# Patient Record
Sex: MALE | Race: WHITE | NOT HISPANIC OR LATINO | Employment: FULL TIME | URBAN - METROPOLITAN AREA
[De-identification: names, ages, dates, MRNs, and addresses within clinical notes are randomized per-mention and may not be internally consistent; named-entity substitution may affect disease eponyms.]

---

## 2018-10-30 ENCOUNTER — TELEPHONE (OUTPATIENT)
Dept: CARDIOLOGY | Facility: CLINIC | Age: 53
End: 2018-10-30

## 2018-10-30 NOTE — TELEPHONE ENCOUNTER
Patient was referred to Dr Chiang from Dr Marie for ASD closure.  Dr Chiang has reviewed the echo and was notified today by Dr Marie that he also has afib and would like to have ablation here at [facility].  Dr Chiang spoke with Dr Agustin and he will see and ablate prior to closing the ASD.

## 2018-11-05 DIAGNOSIS — I49.8 OTHER SPECIFIED CARDIAC ARRHYTHMIAS: Primary | ICD-10-CM

## 2018-11-07 ENCOUNTER — OFFICE VISIT (OUTPATIENT)
Dept: ELECTROPHYSIOLOGY | Facility: CLINIC | Age: 53
End: 2018-11-07
Payer: COMMERCIAL

## 2018-11-07 ENCOUNTER — HOSPITAL ENCOUNTER (OUTPATIENT)
Dept: CARDIOLOGY | Facility: CLINIC | Age: 53
Discharge: HOME | End: 2018-11-07
Payer: COMMERCIAL

## 2018-11-07 VITALS
DIASTOLIC BLOOD PRESSURE: 70 MMHG | SYSTOLIC BLOOD PRESSURE: 122 MMHG | BODY MASS INDEX: 33.11 KG/M2 | WEIGHT: 249.81 LBS | HEART RATE: 90 BPM | HEIGHT: 73 IN

## 2018-11-07 DIAGNOSIS — Q21.10 ASD (ATRIAL SEPTAL DEFECT): ICD-10-CM

## 2018-11-07 DIAGNOSIS — I48.19 PERSISTENT ATRIAL FIBRILLATION: Primary | ICD-10-CM

## 2018-11-07 DIAGNOSIS — I49.8 OTHER SPECIFIED CARDIAC ARRHYTHMIAS: ICD-10-CM

## 2018-11-07 DIAGNOSIS — I48.19 PERSISTENT ATRIAL FIBRILLATION: ICD-10-CM

## 2018-11-07 PROCEDURE — 93000 ELECTROCARDIOGRAM COMPLETE: CPT | Mod: S$GLB,,, | Performed by: INTERNAL MEDICINE

## 2018-11-07 PROCEDURE — 99202 OFFICE O/P NEW SF 15 MIN: CPT | Mod: S$GLB,,, | Performed by: INTERNAL MEDICINE

## 2018-11-07 PROCEDURE — 99999 PR PBB SHADOW E&M-EST. PATIENT-LVL III: CPT | Mod: PBBFAC,,, | Performed by: INTERNAL MEDICINE

## 2018-11-07 NOTE — PROGRESS NOTES
"Subjective:    Patient ID:  Juan Luis Guadalupe is a 53 y.o. male who presents for evaluation of Atrial Fibrillation      HPI 54 yo male with atrial fibrillation, ASD.  Primary cardiologist is Dr. Marie.  Presented to PCP with fatigue, noted to be in atrial fibrillation 10/18.  Has noted fatigue increasing in nature over the past year.  Also noted "something not feeling right in my chest."  Echo 9/20/18 normal biventricular structure and function mildly dilated RA and LA, sinus venosus ASD < 10 mm.  Placed on Xarelto.  DCCV yesterday.  Left hospital in nsr (I have an ECG confirming nsr after).  Is back in atrial fibrillation today.  Referred for evaluation of sleep apnea.  Was told he had "mild" sleep apnea.      Review of Systems   Constitution: Positive for malaise/fatigue. Negative for weakness.   Cardiovascular: Negative for chest pain, dyspnea on exertion, irregular heartbeat, leg swelling, near-syncope, orthopnea, palpitations, paroxysmal nocturnal dyspnea and syncope.   Respiratory: Negative for cough.    Neurological: Negative for dizziness and light-headedness.   All other systems reviewed and are negative.       Objective:    Physical Exam   Constitutional: He is oriented to person, place, and time. He appears well-developed and well-nourished.   Eyes: Conjunctivae are normal. No scleral icterus.   Neck: No JVD present. No tracheal deviation present.   Cardiovascular: Normal rate and normal heart sounds. An irregularly irregular rhythm present. PMI is not displaced.   Pulmonary/Chest: Effort normal and breath sounds normal. No respiratory distress.   Abdominal: Soft. There is no hepatosplenomegaly. There is no tenderness.   Musculoskeletal: He exhibits no edema (lower extremity) or tenderness.   Neurological: He is alert and oriented to person, place, and time.   Skin: Skin is warm and dry. No rash noted.   Psychiatric: He has a normal mood and affect. His behavior is normal.         Assessment:       1. " Persistent atrial fibrillation    2. ASD (atrial septal defect)         Plan:       Persistent atrial fibrillation, symptomatic.  Failed DCCV.  Prefers to defer anti-arrhythmic therapy.  Also, has ASD that will ultimately warrant closure.  It would make sense to proceed with PVI prior to closure.  Risks and benefits of PVI discussed, he would like to proceed.    24 hr holter monitor to assess whether he is persistent or paroxysmal (suspect the former).    RFA.  CTA of chest to delineate PV anatomy.  JOS 1 day prior.  Continue Xarelto evening prior.

## 2018-11-16 ENCOUNTER — HOSPITAL ENCOUNTER (OUTPATIENT)
Dept: RADIOLOGY | Facility: HOSPITAL | Age: 53
Discharge: HOME | End: 2018-11-16
Attending: INTERNAL MEDICINE
Payer: COMMERCIAL

## 2018-11-16 DIAGNOSIS — I48.19 PERSISTENT ATRIAL FIBRILLATION: ICD-10-CM

## 2018-11-16 PROCEDURE — 71275 CT ANGIOGRAPHY CHEST: CPT | Mod: TC

## 2018-11-16 PROCEDURE — 71275 CT ANGIOGRAPHY CHEST: CPT | Mod: 26,,, | Performed by: RADIOLOGY

## 2018-11-16 PROCEDURE — 25500020 PHARM REV CODE 255: Performed by: INTERNAL MEDICINE

## 2018-11-16 RX ADMIN — IOHEXOL 100 ML: 350 INJECTION, SOLUTION INTRAVENOUS at 12:11

## 2018-12-04 DIAGNOSIS — I48.19 PERSISTENT ATRIAL FIBRILLATION: Primary | ICD-10-CM

## 2018-12-04 NOTE — PROGRESS NOTES
TRANSESOPHAGEAL ECHO (JOS) INSTRUCTIONS    You have been scheduled for a procedure in the echo lab on 12/13/2018.      Please report to the Cardiology Waiting Area on the Third floor of the hospital and check in at @ 11 AM.     You will then be taken to the SSCU (Short Stay Cardiac Unit) and prepared for your procedure. Please be aware that this is not the time of your procedure but the time you are to arrive. The procedures are scheduled on an hourly basis; however, emergency cases take precedence over all other cases.      You may not have anything to eat or drink after midnight the night before your test.     Medications:  You may take your regular morning medications with water.     The procedure will take 1-2 hours to perform. After the procedure, you will return to SSCU on the third floor of the hospital. Your family may remain in the room with you during this time.      You will be discharged home that same afternoon. You must have someone to drive you home.  Your doctor will determine, based on your progress, the time of your discharge. The results of your procedure will be discussed with you before you are discharged.      You will be contacted by the echo department prior to your procedure for a  pre-procedure questionnaire.     Any need to reschedule or cancel procedures, or any questions regarding your procedures should be addressed directly with the Arrhythmia Department Nurses at the following phone number: [phone].    Directions to Cardiology Waiting Area:  If you park in the Parking Garage:  Take elevators to the 2nd floor  Walk up ramp and turn right by Gold Elevators  Take elevator to the 3rd floor  Upon exiting the elevator, turn away from the clinic areas  Walk long reilly around to front of hospital to area with windows overlooking [facility]  Check in at Reception Desk  OR  If family is dropping you off:  Have them drop you off at the front of the Hospital  (Near the ER, where all the  flags are hung).  Take the E elevators to the 3rd floor.  Check in at the Reception Desk in the waiting room.

## 2018-12-04 NOTE — PROGRESS NOTES
ABLATION EDUCATION CHECKLIST    PRE-PROCEDURE TESTIN/5/2018 - Pre op lab work  · Lab orders faxed to [facility] and attached to this email  · Fasting NOT required      2018 - JOS (Transesophageal Echocardiogram) at 11am  · Please see separate instructions        DAY OF PROCEDURE:    2018 @ 5:30 AM  Report to Cardiology Waiting Room on 3rd floor of the Hospital    · Do not eat or drink anything after: 12 mn on the night before your procedure      Medications:   · Continue Xarelto the evening prior to ablation  · You may take your usual morning medications with a sip of water      Directions to the Cardiology Waiting Room  If you park in the Parking Garage:  Take elevators to the 2nd floor  Walk up ramp and turn right by Gold Elevators  Take elevator to the 3rd floor  Upon exiting the elevator, turn away from the clinic areas  Walk long reilly around to front of hospital to area with windows overlooking [facility]  Check in at Reception Desk  OR  If family is dropping you off:  Have them drop you off at the front of the Hospital  (Near the ER, where all the flags are hung).  Take the E elevators to the 3rd floor.  Check in at the Reception Desk in the waiting room.    · You will be spending the night after your procedure.  · You will need someone to drive you home the day after your procedure.  · Your pain during your procedure will be managed by the anesthesia team.     Any need to reschedule or cancel procedures, or any questions regarding your procedures should be addressed directly with the Arrhythmia Department Nurses at the following phone number: [phone]

## 2018-12-06 DIAGNOSIS — I48.19 PERSISTENT ATRIAL FIBRILLATION: Primary | ICD-10-CM

## 2018-12-11 RX ORDER — SODIUM CHLORIDE 9 MG/ML
INJECTION, SOLUTION INTRAVENOUS CONTINUOUS
Status: CANCELLED | OUTPATIENT
Start: 2018-12-11

## 2018-12-12 ENCOUNTER — TELEPHONE (OUTPATIENT)
Dept: CARDIOLOGY | Facility: CLINIC | Age: 53
End: 2018-12-12

## 2018-12-12 NOTE — TELEPHONE ENCOUNTER
Patient called about JOS scheduled on 12/13/18. Denies swallowing issues and esophageal issues.  JOS described to patient. Instructed NPO past midnight except medications with a small sip of water. Patient instructed will need a designated  as unable to drive or operate machinery for 24 hours post procedure. Instructed to report to SSCU at designated time .Verbalizes understanding. Questions answered.     Height: 73  Weight: 249    Blood thinner:  Xarelto

## 2018-12-13 ENCOUNTER — ANESTHESIA EVENT (OUTPATIENT)
Dept: MEDSURG UNIT | Facility: HOSPITAL | Age: 53
End: 2018-12-13
Payer: COMMERCIAL

## 2018-12-13 ENCOUNTER — HOSPITAL ENCOUNTER (OUTPATIENT)
Facility: HOSPITAL | Age: 53
Discharge: HOME | End: 2018-12-13
Attending: INTERNAL MEDICINE | Admitting: INTERNAL MEDICINE
Payer: COMMERCIAL

## 2018-12-13 ENCOUNTER — ANESTHESIA (OUTPATIENT)
Dept: MEDSURG UNIT | Facility: HOSPITAL | Age: 53
End: 2018-12-13
Payer: COMMERCIAL

## 2018-12-13 ENCOUNTER — HOSPITAL ENCOUNTER (OUTPATIENT)
Dept: CARDIOLOGY | Facility: CLINIC | Age: 53
Discharge: HOME | End: 2018-12-13
Payer: COMMERCIAL

## 2018-12-13 ENCOUNTER — RESEARCH ENCOUNTER (OUTPATIENT)
Dept: RESEARCH | Facility: HOSPITAL | Age: 53
End: 2018-12-13

## 2018-12-13 ENCOUNTER — HOSPITAL ENCOUNTER (OUTPATIENT)
Dept: CARDIOLOGY | Facility: CLINIC | Age: 53
Discharge: HOME | End: 2018-12-13
Attending: INTERNAL MEDICINE
Payer: COMMERCIAL

## 2018-12-13 VITALS
SYSTOLIC BLOOD PRESSURE: 124 MMHG | HEIGHT: 73 IN | BODY MASS INDEX: 33.13 KG/M2 | DIASTOLIC BLOOD PRESSURE: 82 MMHG | WEIGHT: 250 LBS | HEART RATE: 90 BPM

## 2018-12-13 VITALS
SYSTOLIC BLOOD PRESSURE: 124 MMHG | HEART RATE: 93 BPM | HEIGHT: 73 IN | RESPIRATION RATE: 20 BRPM | TEMPERATURE: 98 F | BODY MASS INDEX: 33.13 KG/M2 | OXYGEN SATURATION: 98 % | WEIGHT: 250 LBS | DIASTOLIC BLOOD PRESSURE: 82 MMHG

## 2018-12-13 DIAGNOSIS — I48.19 PERSISTENT ATRIAL FIBRILLATION: ICD-10-CM

## 2018-12-13 DIAGNOSIS — Q21.10 ASD (ATRIAL SEPTAL DEFECT): ICD-10-CM

## 2018-12-13 DIAGNOSIS — I48.91 AF (ATRIAL FIBRILLATION): ICD-10-CM

## 2018-12-13 DIAGNOSIS — I48.19 PERSISTENT ATRIAL FIBRILLATION: Primary | ICD-10-CM

## 2018-12-13 LAB
ABO + RH BLD: NORMAL
ANION GAP SERPL CALC-SCNC: 12 MMOL/L
APTT BLDCRRT: 30.4 SEC
ASCENDING AORTA: 2.9 CM
ASCENDING AORTA: 3 CM
BASOPHILS # BLD AUTO: 0.03 K/UL
BASOPHILS NFR BLD: 0.6 %
BLD GP AB SCN CELLS X3 SERPL QL: NORMAL
BSA FOR ECHO PROCEDURE: 2.42 M2
BSA FOR ECHO PROCEDURE: 2.42 M2
BUN SERPL-MCNC: 19 MG/DL
CALCIUM SERPL-MCNC: 9.1 MG/DL
CHLORIDE SERPL-SCNC: 111 MMOL/L
CO2 SERPL-SCNC: 18 MMOL/L
CREAT SERPL-MCNC: 1.2 MG/DL
CV ECHO LV RWT: 0.44 CM
DIFFERENTIAL METHOD: ABNORMAL
DOP CALC LVOT AREA: 3.76 CM2
DOP CALC LVOT DIAMETER: 2.19 CM
DOP CALC LVOT STROKE VOLUME: 47.74 CM3
DOP CALCLVOT PEAK VEL VTI: 12.68 CM
E/E' RATIO: 13.67
ECHO LV POSTERIOR WALL: 0.99 CM (ref 0.6–1.1)
EOSINOPHIL # BLD AUTO: 0.1 K/UL
EOSINOPHIL NFR BLD: 2.7 %
ERYTHROCYTE [DISTWIDTH] IN BLOOD BY AUTOMATED COUNT: 12.6 %
EST. GFR  (AFRICAN AMERICAN): >60 ML/MIN/1.73 M^2
EST. GFR  (NON AFRICAN AMERICAN): >60 ML/MIN/1.73 M^2
FRACTIONAL SHORTENING: 29 % (ref 28–44)
GLUCOSE SERPL-MCNC: 95 MG/DL
HCT VFR BLD AUTO: 46.2 %
HGB BLD-MCNC: 15.8 G/DL
IMM GRANULOCYTES # BLD AUTO: 0.01 K/UL
IMM GRANULOCYTES NFR BLD AUTO: 0.2 %
INR PPP: 1.1
INTERVENTRICULAR SEPTUM: 1.07 CM (ref 0.6–1.1)
LA MAJOR: 6.53 CM
LA MINOR: 6.69 CM
LA WIDTH: 4.47 CM
LEFT ATRIUM SIZE: 5 CM
LEFT ATRIUM VOLUME INDEX: 53.1 ML/M2
LEFT ATRIUM VOLUME: 125.56 CM3
LEFT INTERNAL DIMENSION IN SYSTOLE: 3.19 CM (ref 2.1–4)
LEFT VENTRICLE DIASTOLIC VOLUME INDEX: 38.97 ML/M2
LEFT VENTRICLE DIASTOLIC VOLUME: 92.2 ML
LEFT VENTRICLE MASS INDEX: 67.2 G/M2
LEFT VENTRICLE SYSTOLIC VOLUME INDEX: 17.1 ML/M2
LEFT VENTRICLE SYSTOLIC VOLUME: 40.54 ML
LEFT VENTRICULAR INTERNAL DIMENSION IN DIASTOLE: 4.49 CM (ref 3.5–6)
LEFT VENTRICULAR MASS: 159.09 G
LV LATERAL E/E' RATIO: 11.71
LV SEPTAL E/E' RATIO: 16.4
LYMPHOCYTES # BLD AUTO: 1.5 K/UL
LYMPHOCYTES NFR BLD: 30.7 %
MCH RBC QN AUTO: 32.8 PG
MCHC RBC AUTO-ENTMCNC: 34.2 G/DL
MCV RBC AUTO: 96 FL
MONOCYTES # BLD AUTO: 0.4 K/UL
MONOCYTES NFR BLD: 7.6 %
MV PEAK E VEL: 0.82 M/S
NEUTROPHILS # BLD AUTO: 2.9 K/UL
NEUTROPHILS NFR BLD: 58.2 %
NRBC BLD-RTO: 0 /100 WBC
PLATELET # BLD AUTO: 181 K/UL
PMV BLD AUTO: 9.9 FL
POTASSIUM SERPL-SCNC: 5.2 MMOL/L
PROTHROMBIN TIME: 11.2 SEC
RA MAJOR: 6.3 CM
RA PRESSURE: 3 MMHG
RA WIDTH: 5.27 CM
RBC # BLD AUTO: 4.81 M/UL
RIGHT VENTRICULAR END-DIASTOLIC DIMENSION: 4.69 CM
SINUS: 3.1 CM
SINUS: 3.4 CM
SODIUM SERPL-SCNC: 141 MMOL/L
STJ: 2.61 CM
STJ: 2.7 CM
TDI LATERAL: 0.07
TDI SEPTAL: 0.05
TDI: 0.06
TRICUSPID ANNULAR PLANE SYSTOLIC EXCURSION: 1.45 CM
WBC # BLD AUTO: 4.89 K/UL

## 2018-12-13 PROCEDURE — 85025 COMPLETE CBC W/AUTO DIFF WBC: CPT

## 2018-12-13 PROCEDURE — 85730 THROMBOPLASTIN TIME PARTIAL: CPT

## 2018-12-13 PROCEDURE — 93325 DOPPLER ECHO COLOR FLOW MAPG: CPT | Mod: S$GLB,,, | Performed by: INTERNAL MEDICINE

## 2018-12-13 PROCEDURE — 93005 ELECTROCARDIOGRAM TRACING: CPT

## 2018-12-13 PROCEDURE — 80048 BASIC METABOLIC PNL TOTAL CA: CPT

## 2018-12-13 PROCEDURE — 93320 DOPPLER ECHO COMPLETE: CPT | Mod: S$GLB,,, | Performed by: INTERNAL MEDICINE

## 2018-12-13 PROCEDURE — 85610 PROTHROMBIN TIME: CPT

## 2018-12-13 PROCEDURE — 93010 ELECTROCARDIOGRAM REPORT: CPT | Mod: ,,, | Performed by: INTERNAL MEDICINE

## 2018-12-13 PROCEDURE — 63600175 PHARM REV CODE 636 W HCPCS: Performed by: NURSE ANESTHETIST, CERTIFIED REGISTERED

## 2018-12-13 PROCEDURE — 93308 TTE F-UP OR LMTD: CPT | Mod: PBBFAC | Performed by: INTERNAL MEDICINE

## 2018-12-13 PROCEDURE — D9220A PRA ANESTHESIA: Mod: ,,, | Performed by: ANESTHESIOLOGY

## 2018-12-13 PROCEDURE — 25000003 PHARM REV CODE 250: Performed by: NURSE ANESTHETIST, CERTIFIED REGISTERED

## 2018-12-13 PROCEDURE — 86901 BLOOD TYPING SEROLOGIC RH(D): CPT

## 2018-12-13 PROCEDURE — 93312 ECHO TRANSESOPHAGEAL: CPT | Mod: S$GLB,,, | Performed by: INTERNAL MEDICINE

## 2018-12-13 PROCEDURE — 71000033 HC RECOVERY, INTIAL HOUR

## 2018-12-13 RX ORDER — SODIUM CHLORIDE 0.9 % (FLUSH) 0.9 %
3 SYRINGE (ML) INJECTION
Status: DISCONTINUED | OUTPATIENT
Start: 2018-12-13 | End: 2018-12-13 | Stop reason: HOSPADM

## 2018-12-13 RX ORDER — SODIUM CHLORIDE 0.9 % (FLUSH) 0.9 %
5 SYRINGE (ML) INJECTION
Status: DISCONTINUED | OUTPATIENT
Start: 2018-12-13 | End: 2018-12-13 | Stop reason: HOSPADM

## 2018-12-13 RX ORDER — SODIUM CHLORIDE 9 MG/ML
INJECTION, SOLUTION INTRAVENOUS CONTINUOUS PRN
Status: DISCONTINUED | OUTPATIENT
Start: 2018-12-13 | End: 2018-12-13

## 2018-12-13 RX ORDER — PROPOFOL 10 MG/ML
VIAL (ML) INTRAVENOUS CONTINUOUS PRN
Status: DISCONTINUED | OUTPATIENT
Start: 2018-12-13 | End: 2018-12-13

## 2018-12-13 RX ORDER — LIDOCAINE HCL/PF 100 MG/5ML
SYRINGE (ML) INTRAVENOUS
Status: DISCONTINUED | OUTPATIENT
Start: 2018-12-13 | End: 2018-12-13

## 2018-12-13 RX ORDER — PROPOFOL 10 MG/ML
VIAL (ML) INTRAVENOUS
Status: DISCONTINUED | OUTPATIENT
Start: 2018-12-13 | End: 2018-12-13

## 2018-12-13 RX ADMIN — SODIUM CHLORIDE: 0.9 INJECTION, SOLUTION INTRAVENOUS at 01:12

## 2018-12-13 RX ADMIN — PROPOFOL 150 MCG/KG/MIN: 10 INJECTION, EMULSION INTRAVENOUS at 01:12

## 2018-12-13 RX ADMIN — PROPOFOL 100 MG: 10 INJECTION, EMULSION INTRAVENOUS at 01:12

## 2018-12-13 RX ADMIN — LIDOCAINE HYDROCHLORIDE 100 MG: 20 INJECTION, SOLUTION INTRAVENOUS at 01:12

## 2018-12-13 NOTE — ANESTHESIA PREPROCEDURE EVALUATION
12/13/2018  Juan Luis Guadalupe is a 53 y.o., male.    Anesthesia Evaluation    I have reviewed the Patient Summary Reports.     I have reviewed the Medications.     Review of Systems  Anesthesia Hx:  Denies Hx of Anesthetic complications  History of prior surgery of interest to airway management or planning: Denies Family Hx of Anesthesia complications.   Denies Personal Hx of Anesthesia complications.   Hematology/Oncology:  Hematology Normal   Oncology Normal     EENT/Dental:EENT/Dental Normal   Cardiovascular:   Exercise tolerance: good Dysrhythmias    Pulmonary:   Sleep Apnea, CPAP    Renal/:  Renal/ Normal     Hepatic/GI:  Hepatic/GI Normal    Musculoskeletal:  Musculoskeletal Normal    Neurological:  Neurology Normal    Endocrine:  Endocrine Normal    Dermatological:  Skin Normal    Psych:  Psychiatric Normal           Physical Exam  General:  Well nourished    Airway/Jaw/Neck:  Airway Findings: Mouth Opening: Normal Tongue: Normal  General Airway Assessment: Adult  Mallampati: II  Improves to II with phonation.  TM Distance: Normal, at least 6 cm      Dental:  Dental Findings:    Chest/Lungs:  Chest/Lungs Findings: Clear to auscultation     Heart/Vascular:  Heart Findings: Rate: Normal  Rhythm: Regular Rhythm        Mental Status:  Mental Status Findings:  Cooperative, Alert and Oriented         Anesthesia Plan  Type of Anesthesia, risks & benefits discussed:  Anesthesia Type:  general  Patient's Preference:   Intra-op Monitoring Plan: standard ASA monitors  Intra-op Monitoring Plan Comments:   Post Op Pain Control Plan: multimodal analgesia  Post Op Pain Control Plan Comments:   Induction:   IV  Beta Blocker:  Patient is not currently on a Beta-Blocker (No further documentation required).       Informed Consent: Patient understands risks and agrees with Anesthesia plan.  Questions answered.  Anesthesia consent signed with patient.  ASA Score: 2     Day of Surgery Review of History & Physical:    H&P update referred to the surgeon.         Ready For Surgery From Anesthesia Perspective.

## 2018-12-13 NOTE — SUBJECTIVE & OBJECTIVE
Past Medical History:   Diagnosis Date    Anticoagulant long-term use        History reviewed. No pertinent surgical history.    Review of patient's allergies indicates:  No Known Allergies    No current facility-administered medications on file prior to encounter.      Current Outpatient Medications on File Prior to Encounter   Medication Sig    rivaroxaban (XARELTO) 20 mg Tab Take 20 mg by mouth.     Family History     None        Tobacco Use    Smoking status: Never Smoker    Smokeless tobacco: Never Used   Substance and Sexual Activity    Alcohol use: Yes     Alcohol/week: 1.2 oz     Types: 2 Glasses of wine per week     Comment: occassional    Drug use: Not on file    Sexual activity: Not on file     Review of Systems   Constitution: Negative.   HENT: Negative.    Eyes: Negative.    Cardiovascular: Negative.    Respiratory: Negative.    Endocrine: Negative.    Skin: Negative.    Musculoskeletal: Negative.    Gastrointestinal: Negative.    Genitourinary: Negative.    Neurological: Negative.      Objective:     Vital Signs (Most Recent):  Temp: 97.5 °F (36.4 °C) (12/13/18 1130)  Pulse: 84 (12/13/18 1130)  Resp: 18 (12/13/18 1130)  BP: 110/77 (12/13/18 1131)  SpO2: 98 % (12/13/18 1130) Vital Signs (24h Range):  Temp:  [97.5 °F (36.4 °C)] 97.5 °F (36.4 °C)  Pulse:  [84] 84  Resp:  [18] 18  SpO2:  [98 %] 98 %  BP: (110)/(77-79) 110/77     Weight: 113.4 kg (250 lb)  Body mass index is 32.98 kg/m².    SpO2: 98 %  O2 Device (Oxygen Therapy): room air    No intake or output data in the 24 hours ending 12/13/18 1206    Lines/Drains/Airways     Peripheral Intravenous Line                 Peripheral IV - Single Lumen 12/13/18 1148 Left Hand less than 1 day                Physical Exam   Constitutional: He is oriented to person, place, and time. He appears well-developed and well-nourished.   HENT:   Head: Normocephalic and atraumatic.   Eyes: Conjunctivae and EOM are normal. Pupils are equal, round, and reactive to  light.   Neck: Normal range of motion. Neck supple. No JVD present.   Cardiovascular: Normal rate and normal heart sounds. Exam reveals no gallop and no friction rub.   No murmur heard.  Pulmonary/Chest: Effort normal and breath sounds normal. No respiratory distress. He has no wheezes. He has no rales. He exhibits no tenderness.   Abdominal: Soft. Bowel sounds are normal. He exhibits no distension. There is no tenderness.   Musculoskeletal: Normal range of motion. He exhibits no edema or tenderness.   Neurological: He is alert and oriented to person, place, and time.   Skin: Skin is warm and dry. No erythema. No pallor.       Significant Labs:   Recent Lab Results     None          Significant Imaging: as per hpi

## 2018-12-13 NOTE — NURSING TRANSFER
Nursing Transfer Note      12/13/2018     Transfer To: 7a    Transfer via stretcher    Transfer with nurse    Transported by zenon rn    Medicines sent: none    Chart send with patient: Yes    Notified:reported to secret RN    Patient reassessed at: see epic (date, time)    Upon arrival to floor: to room no complaints no distress noted.

## 2018-12-13 NOTE — PROGRESS NOTES
Patient admitted to recovery see [facility] for complete assessment pacu bcg's maintained safety measures verified patient instructed on pain scale and patient verbalized  Understanding. Also I called cecil lewis rn to let her know patient needs orders. Also called patient's wife and updated on patient location with the permission of patient.

## 2018-12-13 NOTE — H&P
"[facility]  Cardiology  History and Physical     Patient Name: Juan Luis Guadalupe  MRN: [id number]  Admission Date: 12/13/2018  Code Status: No Order   Attending Provider: Erick Agustin MD   Primary Care Physician: Primary Doctor No  Principal Problem:<principal problem not specified>    Patient information was obtained from patient and past medical records.     Subjective:     Chief Complaint:  AF     HPI:  54 yo male with atrial fibrillation, ASD.  Presented to PCP with fatigue, noted to be in atrial fibrillation 10/18.  Has noted fatigue increasing in nature over the past year.  Also noted "something not feeling right in my chest."  Echo 9/20/18 normal biventricular structure and function mildly dilated RA and LA, sinus venosus ASD < 10 mm.  Placed on Xarelto.  Prior DCCV yesterday.  Left hospital in [facility], back in atrial fibrillation one day later.  Referred for evaluation of sleep apnea.  Was told he had "mild" sleep apnea.    Dysphagia or odynophagia:  No  Liver Disease, esophageal disease, or known varices:  No  Upper GI Bleeding: No  Snoring:  No  Sleep Apnea:  Yes  Prior neck surgery or radiation:  No  History of anesthetic difficulties:  No  Family history of anesthetic difficulties:  No  Last oral intake:  12 hours ago  Able to move neck in all directions:  Yes    Mallampati:2  ASA:2    Imaging:  No priors in our system          Past Medical History:   Diagnosis Date    Anticoagulant long-term use        History reviewed. No pertinent surgical history.    Review of patient's allergies indicates:  No Known Allergies    No current facility-administered medications on file prior to encounter.      Current Outpatient Medications on File Prior to Encounter   Medication Sig    rivaroxaban (XARELTO) 20 mg Tab Take 20 mg by mouth.     Family History     None        Tobacco Use    Smoking status: Never Smoker    Smokeless tobacco: Never Used   Substance and Sexual Activity    Alcohol use: Yes "     Alcohol/week: 1.2 oz     Types: 2 Glasses of wine per week     Comment: occassional    Drug use: Not on file    Sexual activity: Not on file     Review of Systems   Constitution: Negative.   HENT: Negative.    Eyes: Negative.    Cardiovascular: Negative.    Respiratory: Negative.    Endocrine: Negative.    Skin: Negative.    Musculoskeletal: Negative.    Gastrointestinal: Negative.    Genitourinary: Negative.    Neurological: Negative.      Objective:     Vital Signs (Most Recent):  Temp: 97.5 °F (36.4 °C) (12/13/18 1130)  Pulse: 84 (12/13/18 1130)  Resp: 18 (12/13/18 1130)  BP: 110/77 (12/13/18 1131)  SpO2: 98 % (12/13/18 1130) Vital Signs (24h Range):  Temp:  [97.5 °F (36.4 °C)] 97.5 °F (36.4 °C)  Pulse:  [84] 84  Resp:  [18] 18  SpO2:  [98 %] 98 %  BP: (110)/(77-79) 110/77     Weight: 113.4 kg (250 lb)  Body mass index is 32.98 kg/m².    SpO2: 98 %  O2 Device (Oxygen Therapy): room air    No intake or output data in the 24 hours ending 12/13/18 1206    Lines/Drains/Airways     Peripheral Intravenous Line                 Peripheral IV - Single Lumen 12/13/18 1148 Left Hand less than 1 day                Physical Exam   Constitutional: He is oriented to person, place, and time. He appears well-developed and well-nourished.   HENT:   Head: Normocephalic and atraumatic.   Eyes: Conjunctivae and EOM are normal. Pupils are equal, round, and reactive to light.   Neck: Normal range of motion. Neck supple. No JVD present.   Cardiovascular: Normal rate and normal heart sounds. Exam reveals no gallop and no friction rub.   No murmur heard.  Pulmonary/Chest: Effort normal and breath sounds normal. No respiratory distress. He has no wheezes. He has no rales. He exhibits no tenderness.   Abdominal: Soft. Bowel sounds are normal. He exhibits no distension. There is no tenderness.   Musculoskeletal: Normal range of motion. He exhibits no edema or tenderness.   Neurological: He is alert and oriented to person, place, and  time.   Skin: Skin is warm and dry. No erythema. No pallor.       Significant Labs:   Recent Lab Results     None          Significant Imaging: as per hpi    Assessment and Plan:     Persistent atrial fibrillation      1. JOS for evaluation of BHUPENDRA prior to PVI.    -The risks, benefits & alternatives of the procedure were explained to the patient.    -The risks of transesophageal echo include but are not limited to:  Dental trauma, esophageal trauma/perforation, bleeding, laryngospasm/brochospasm, aspiration, sore throat/hoarseness, & dislodgement of the endotracheal tube/nasogastric tube (where applicable).    -The risks of moderate sedation include hypotension, respiratory depression, arrhythmias, bronchospasm, & death.    -Informed consent was obtained & the patient is agreeable to proceed with the procedure.    I will discuss with the attending physician. Attending addendum is to follow.     Further recommendations per attending addendum    Praneeth Castro MD  PGY-V Cardiology Fellow  [phone]

## 2018-12-13 NOTE — TRANSFER OF CARE
"Anesthesia Transfer of Care Note    Patient: Juan Luis Guadalupe    Procedure(s) Performed: Procedure(s) (LRB):  Transesophageal echo (JOS) intra-procedure log documentation (N/A)    Patient location: PACU    Anesthesia Type: general    Transport from OR: Transported from OR on room air with adequate spontaneous ventilation    Post pain: adequate analgesia    Post assessment: tolerated procedure well and no apparent anesthetic complications    Post vital signs: stable    Level of consciousness: sedated    Nausea/Vomiting: no nausea/vomiting    Complications: none    Transfer of care protocol was followed      Last vitals:   Visit Vitals  /77 (BP Location: Right arm, Patient Position: Lying)   Pulse 84   Temp 36.4 °C (97.5 °F) (Oral)   Resp 18   Ht 6' 1" (1.854 m)   Wt 113.4 kg (250 lb)   SpO2 98%   BMI 32.98 kg/m²     "

## 2018-12-13 NOTE — PROGRESS NOTES
Patient discharged per MD orders. Instructions given on medications, wound care, activity, signs of infection, when to call MD, and follow up appointments. Pt verbalized understanding.  Patient AAOx3, VSS, no c/o pain or discomfort at this time. PIV removed. Patient refused transport, ambulated off unit with family.

## 2018-12-13 NOTE — PROGRESS NOTES
Date: 12/13/2018    Study Title/IRB Number: [id number]     Principle Investigator: Erick Agustin MD     Present for Discussion: Pt, pts wife and CRC      Is LAR Consenting for Subject: NA     Prior to the Informed Consent (IC) being signed, or any study protocol required data collection, testing, procedure, or intervention being performed, the following was done and/or discussed:  · Patient was given a copy of the IC for review   · Purpose of the study and qualifications to participate   · Study design, Follow up schedule, and tests or procedures done at each visit  · Confidentiality and HIPAA Authorization for Release of Medical Records for the research trial/ subject's rights/research related injury  · Risk, Benefits, Alternative Treatments, Compensation and Costs  · Participation in the research trial is voluntary and patient may withdraw at anytime  · Contact information for study related questions     Patient verbalizes understanding of the above: Yes  Contact information for CRC and PI given to patient: Yes  Patient able to adequately summarize: the purpose of the study, the risks associated with the study, and all procedures, testing, and follow-ups associated with the study: Yes     Mr. Guadalupe signed the informed consent form for the PERSIST-END research study with an IRB approval date of 12/6/2018.  Each page of the consent form was reviewed with the patient and all questions answered satisfactorily. He signed the consent form and received a copy of same. The original consent was scanned into electronic medical records (EPIC) and filed into the subject's research study binder.    EQ-5D, AFEQT, Exercise form and NIHSS completed per protocol.  Patients procedure scheduled for this Friday (12/14/2018).

## 2018-12-13 NOTE — PLAN OF CARE
Problem: Adult Inpatient Plan of Care  Goal: Plan of Care Review  Outcome: Ongoing (interventions implemented as appropriate)  Patient arrived to room. PIV placed, labs sent. Admit assessment completed. Plan of care discussed with patient. Will monitor

## 2018-12-13 NOTE — ANESTHESIA POSTPROCEDURE EVALUATION
"Anesthesia Post Evaluation    Patient: Juan Luis Guadalupe    Procedure(s) Performed: Procedure(s) (LRB):  Transesophageal echo (JOS) intra-procedure log documentation (N/A)    Final Anesthesia Type: general  Patient location during evaluation: PACU  Patient participation: Yes- Able to Participate  Level of consciousness: awake and alert  Post-procedure vital signs: reviewed and stable  Pain management: adequate  Airway patency: patent  PONV status at discharge: No PONV  Anesthetic complications: no      Cardiovascular status: blood pressure returned to baseline and hemodynamically stable  Respiratory status: spontaneous ventilation and unassisted  Hydration status: euvolemic  Follow-up not needed.        Visit Vitals  /64 (BP Location: Right arm, Patient Position: Lying)   Pulse 88   Temp 36.5 °C (97.7 °F) (Tympanic)   Resp (!) 24   Ht 6' 1" (1.854 m)   Wt 113.4 kg (250 lb)   SpO2 98%   BMI 32.98 kg/m²       Pain/Kimberly Score: No Data Recorded      "

## 2018-12-13 NOTE — PLAN OF CARE
Problem: Adult Inpatient Plan of Care  Goal: Plan of Care Review  Outcome: Ongoing (interventions implemented as appropriate)  Report received from CHRIS Alvarez. Patient s/p JOS. Vss. No complaints from patient. Call light in reach, will monitor.

## 2018-12-13 NOTE — ASSESSMENT & PLAN NOTE
1. JOS for evaluation of BHUPENDRA prior to PVI.    -The risks, benefits & alternatives of the procedure were explained to the patient.    -The risks of transesophageal echo include but are not limited to:  Dental trauma, esophageal trauma/perforation, bleeding, laryngospasm/brochospasm, aspiration, sore throat/hoarseness, & dislodgement of the endotracheal tube/nasogastric tube (where applicable).    -The risks of moderate sedation include hypotension, respiratory depression, arrhythmias, bronchospasm, & death.    -Informed consent was obtained & the patient is agreeable to proceed with the procedure.    I will discuss with the attending physician. Attending addendum is to follow.     Further recommendations per attending addendum    Praneeth Castro MD  PGY-V Cardiology Fellow  [phone]

## 2018-12-14 ENCOUNTER — HOSPITAL ENCOUNTER (OUTPATIENT)
Facility: HOSPITAL | Age: 53
Discharge: HOME | End: 2018-12-15
Attending: INTERNAL MEDICINE | Admitting: INTERNAL MEDICINE
Payer: COMMERCIAL

## 2018-12-14 ENCOUNTER — ANESTHESIA (OUTPATIENT)
Dept: MEDSURG UNIT | Facility: HOSPITAL | Age: 53
End: 2018-12-14
Payer: COMMERCIAL

## 2018-12-14 DIAGNOSIS — I49.9 ARRHYTHMIA: ICD-10-CM

## 2018-12-14 DIAGNOSIS — I48.19 PERSISTENT ATRIAL FIBRILLATION: ICD-10-CM

## 2018-12-14 DIAGNOSIS — I48.91 AF (ATRIAL FIBRILLATION): ICD-10-CM

## 2018-12-14 PROBLEM — K14.8 TONGUE LESION: Status: ACTIVE | Noted: 2018-12-14

## 2018-12-14 LAB
POC ACTIVATED CLOTTING TIME K: 285 SEC (ref 74–137)
POC ACTIVATED CLOTTING TIME K: 351 SEC (ref 74–137)
POC ACTIVATED CLOTTING TIME K: 367 SEC (ref 74–137)
POC ACTIVATED CLOTTING TIME K: 378 SEC (ref 74–137)
POC ACTIVATED CLOTTING TIME K: 400 SEC (ref 74–137)
POC ACTIVATED CLOTTING TIME K: 400 SEC (ref 74–137)
SAMPLE: ABNORMAL

## 2018-12-14 PROCEDURE — 27201423 OPTIME MED/SURG SUP & DEVICES STERILE SUPPLY: Performed by: INTERNAL MEDICINE

## 2018-12-14 PROCEDURE — 92960 CARDIOVERSION ELECTRIC EXT: CPT | Mod: 59 | Performed by: INTERNAL MEDICINE

## 2018-12-14 PROCEDURE — 93005 ELECTROCARDIOGRAM TRACING: CPT

## 2018-12-14 PROCEDURE — C1753 CATH, INTRAVAS ULTRASOUND: HCPCS | Performed by: INTERNAL MEDICINE

## 2018-12-14 PROCEDURE — 63600175 PHARM REV CODE 636 W HCPCS: Performed by: NURSE ANESTHETIST, CERTIFIED REGISTERED

## 2018-12-14 PROCEDURE — 93662 INTRACARDIAC ECG (ICE): CPT | Mod: 26,,, | Performed by: INTERNAL MEDICINE

## 2018-12-14 PROCEDURE — C1730 CATH, EP, 19 OR FEW ELECT: HCPCS | Performed by: INTERNAL MEDICINE

## 2018-12-14 PROCEDURE — 93613 INTRACARDIAC EPHYS 3D MAPG: CPT

## 2018-12-14 PROCEDURE — 99900035 HC TECH TIME PER 15 MIN (STAT)

## 2018-12-14 PROCEDURE — 25000003 PHARM REV CODE 250: Performed by: NURSE PRACTITIONER

## 2018-12-14 PROCEDURE — 25000003 PHARM REV CODE 250

## 2018-12-14 PROCEDURE — 93613 INTRACARDIAC EPHYS 3D MAPG: CPT | Mod: ,,, | Performed by: INTERNAL MEDICINE

## 2018-12-14 PROCEDURE — C1766 INTRO/SHEATH,STRBLE,NON-PEEL: HCPCS | Performed by: INTERNAL MEDICINE

## 2018-12-14 PROCEDURE — 37000008 HC ANESTHESIA 1ST 15 MINUTES: Performed by: INTERNAL MEDICINE

## 2018-12-14 PROCEDURE — 37000009 HC ANESTHESIA EA ADD 15 MINS: Performed by: INTERNAL MEDICINE

## 2018-12-14 PROCEDURE — 25000003 PHARM REV CODE 250: Performed by: INTERNAL MEDICINE

## 2018-12-14 PROCEDURE — 63600175 PHARM REV CODE 636 W HCPCS: Performed by: INTERNAL MEDICINE

## 2018-12-14 PROCEDURE — 92960 CARDIOVERSION ELECTRIC EXT: CPT | Mod: 59,,, | Performed by: INTERNAL MEDICINE

## 2018-12-14 PROCEDURE — 25000003 PHARM REV CODE 250: Performed by: NURSE ANESTHETIST, CERTIFIED REGISTERED

## 2018-12-14 PROCEDURE — 94660 CPAP INITIATION&MGMT: CPT

## 2018-12-14 PROCEDURE — 93656 COMPRE EP EVAL ABLTJ ATR FIB: CPT | Mod: ,,, | Performed by: INTERNAL MEDICINE

## 2018-12-14 PROCEDURE — C1894 INTRO/SHEATH, NON-LASER: HCPCS | Performed by: INTERNAL MEDICINE

## 2018-12-14 PROCEDURE — 93010 ELECTROCARDIOGRAM REPORT: CPT | Mod: 59,,, | Performed by: INTERNAL MEDICINE

## 2018-12-14 PROCEDURE — 93656 COMPRE EP EVAL ABLTJ ATR FIB: CPT | Performed by: INTERNAL MEDICINE

## 2018-12-14 PROCEDURE — 36620 INSERTION CATHETER ARTERY: CPT | Mod: 59,,, | Performed by: ANESTHESIOLOGY

## 2018-12-14 PROCEDURE — 27201037 HC PRESSURE MONITORING SET UP

## 2018-12-14 PROCEDURE — 93662 INTRACARDIAC ECG (ICE): CPT

## 2018-12-14 PROCEDURE — 93010 ELECTROCARDIOGRAM REPORT: CPT | Mod: ,,, | Performed by: INTERNAL MEDICINE

## 2018-12-14 PROCEDURE — D9220A PRA ANESTHESIA: Mod: ,,, | Performed by: ANESTHESIOLOGY

## 2018-12-14 RX ORDER — PROPOFOL 10 MG/ML
VIAL (ML) INTRAVENOUS
Status: DISCONTINUED | OUTPATIENT
Start: 2018-12-14 | End: 2018-12-14

## 2018-12-14 RX ORDER — LIDOCAINE HYDROCHLORIDE 20 MG/ML
INJECTION, SOLUTION EPIDURAL; INFILTRATION; INTRACAUDAL; PERINEURAL
Status: DISCONTINUED | OUTPATIENT
Start: 2018-12-14 | End: 2018-12-14

## 2018-12-14 RX ORDER — MIDAZOLAM HYDROCHLORIDE 1 MG/ML
INJECTION, SOLUTION INTRAMUSCULAR; INTRAVENOUS
Status: DISCONTINUED | OUTPATIENT
Start: 2018-12-14 | End: 2018-12-14

## 2018-12-14 RX ORDER — FUROSEMIDE 10 MG/ML
20 INJECTION INTRAMUSCULAR; INTRAVENOUS ONCE
Status: COMPLETED | OUTPATIENT
Start: 2018-12-14 | End: 2018-12-14

## 2018-12-14 RX ORDER — PANTOPRAZOLE SODIUM 40 MG/1
40 TABLET, DELAYED RELEASE ORAL DAILY
Status: DISCONTINUED | OUTPATIENT
Start: 2018-12-14 | End: 2018-12-15 | Stop reason: HOSPADM

## 2018-12-14 RX ORDER — FENTANYL CITRATE 50 UG/ML
25 INJECTION, SOLUTION INTRAMUSCULAR; INTRAVENOUS EVERY 5 MIN PRN
Status: DISCONTINUED | OUTPATIENT
Start: 2018-12-14 | End: 2018-12-14

## 2018-12-14 RX ORDER — LIDOCAINE HCL/PF 100 MG/5ML
SYRINGE (ML) INTRAVENOUS
Status: DISCONTINUED | OUTPATIENT
Start: 2018-12-14 | End: 2018-12-14

## 2018-12-14 RX ORDER — SODIUM CHLORIDE 9 MG/ML
INJECTION, SOLUTION INTRAVENOUS CONTINUOUS
Status: DISCONTINUED | OUTPATIENT
Start: 2018-12-14 | End: 2018-12-14

## 2018-12-14 RX ORDER — HEPARIN SODIUM 10000 [USP'U]/100ML
INJECTION, SOLUTION INTRAVENOUS CONTINUOUS PRN
Status: DISCONTINUED | OUTPATIENT
Start: 2018-12-14 | End: 2018-12-14

## 2018-12-14 RX ORDER — EPINEPHRINE 1 MG/ML
INJECTION, SOLUTION INTRACARDIAC; INTRAMUSCULAR; INTRAVENOUS; SUBCUTANEOUS
Status: DISCONTINUED | OUTPATIENT
Start: 2018-12-14 | End: 2018-12-14

## 2018-12-14 RX ORDER — ROCURONIUM BROMIDE 10 MG/ML
INJECTION, SOLUTION INTRAVENOUS
Status: DISCONTINUED | OUTPATIENT
Start: 2018-12-14 | End: 2018-12-14

## 2018-12-14 RX ORDER — NALOXONE HCL 0.4 MG/ML
VIAL (ML) INJECTION
Status: DISCONTINUED | OUTPATIENT
Start: 2018-12-14 | End: 2018-12-14

## 2018-12-14 RX ORDER — SUCCINYLCHOLINE CHLORIDE 20 MG/ML
INJECTION INTRAMUSCULAR; INTRAVENOUS
Status: DISCONTINUED | OUTPATIENT
Start: 2018-12-14 | End: 2018-12-14

## 2018-12-14 RX ORDER — SODIUM CHLORIDE 9 MG/ML
INJECTION, SOLUTION INTRAVENOUS CONTINUOUS PRN
Status: DISCONTINUED | OUTPATIENT
Start: 2018-12-14 | End: 2018-12-14

## 2018-12-14 RX ORDER — VASOPRESSIN 20 [USP'U]/ML
INJECTION, SOLUTION INTRAMUSCULAR; SUBCUTANEOUS
Status: DISCONTINUED | OUTPATIENT
Start: 2018-12-14 | End: 2018-12-14

## 2018-12-14 RX ORDER — FENTANYL CITRATE 50 UG/ML
INJECTION, SOLUTION INTRAMUSCULAR; INTRAVENOUS
Status: DISCONTINUED | OUTPATIENT
Start: 2018-12-14 | End: 2018-12-14

## 2018-12-14 RX ORDER — ACETAMINOPHEN 325 MG/1
650 TABLET ORAL EVERY 6 HOURS PRN
Status: DISCONTINUED | OUTPATIENT
Start: 2018-12-14 | End: 2018-12-15 | Stop reason: HOSPADM

## 2018-12-14 RX ORDER — PROTAMINE SULFATE 10 MG/ML
INJECTION, SOLUTION INTRAVENOUS
Status: DISCONTINUED | OUTPATIENT
Start: 2018-12-14 | End: 2018-12-14

## 2018-12-14 RX ORDER — HEPARIN SODIUM 200 [USP'U]/100ML
INJECTION, SOLUTION INTRAVENOUS
Status: DISCONTINUED | OUTPATIENT
Start: 2018-12-14 | End: 2018-12-14

## 2018-12-14 RX ORDER — PHENYLEPHRINE HYDROCHLORIDE 10 MG/ML
INJECTION INTRAVENOUS
Status: DISCONTINUED | OUTPATIENT
Start: 2018-12-14 | End: 2018-12-14

## 2018-12-14 RX ORDER — ONDANSETRON 2 MG/ML
INJECTION INTRAMUSCULAR; INTRAVENOUS
Status: DISCONTINUED | OUTPATIENT
Start: 2018-12-14 | End: 2018-12-14

## 2018-12-14 RX ORDER — HEPARIN SODIUM 1000 [USP'U]/ML
INJECTION, SOLUTION INTRAVENOUS; SUBCUTANEOUS
Status: DISCONTINUED | OUTPATIENT
Start: 2018-12-14 | End: 2018-12-14

## 2018-12-14 RX ORDER — HYDROMORPHONE HYDROCHLORIDE 1 MG/ML
0.2 INJECTION, SOLUTION INTRAMUSCULAR; INTRAVENOUS; SUBCUTANEOUS EVERY 5 MIN PRN
Status: DISCONTINUED | OUTPATIENT
Start: 2018-12-14 | End: 2018-12-14

## 2018-12-14 RX ADMIN — FUROSEMIDE 20 MG: 10 INJECTION, SOLUTION INTRAMUSCULAR; INTRAVENOUS at 02:12

## 2018-12-14 RX ADMIN — SODIUM CHLORIDE 1000 ML: 0.9 INJECTION, SOLUTION INTRAVENOUS at 05:12

## 2018-12-14 RX ADMIN — HEPARIN SODIUM 12000 UNITS: 1000 INJECTION INTRAVENOUS; SUBCUTANEOUS at 08:12

## 2018-12-14 RX ADMIN — PHENYLEPHRINE HYDROCHLORIDE 100 MCG: 10 INJECTION INTRAVENOUS at 08:12

## 2018-12-14 RX ADMIN — PROPAFENONE HYDROCHLORIDE 225 MG: 150 TABLET, FILM COATED ORAL at 08:12

## 2018-12-14 RX ADMIN — PROPOFOL 150 MG: 10 INJECTION, EMULSION INTRAVENOUS at 07:12

## 2018-12-14 RX ADMIN — HEPARIN SODIUM AND DEXTROSE 5000 UNITS/HR: 10000; 5 INJECTION INTRAVENOUS at 08:12

## 2018-12-14 RX ADMIN — SUCCINYLCHOLINE CHLORIDE 140 MG: 20 INJECTION, SOLUTION INTRAMUSCULAR; INTRAVENOUS at 07:12

## 2018-12-14 RX ADMIN — CALCIUM CHLORIDE 0.5 G: 100 INJECTION, SOLUTION INTRAVENOUS at 11:12

## 2018-12-14 RX ADMIN — ACETAMINOPHEN 650 MG: 325 TABLET, FILM COATED ORAL at 08:12

## 2018-12-14 RX ADMIN — HEPARIN SODIUM 6000 UNITS: 1000 INJECTION INTRAVENOUS; SUBCUTANEOUS at 08:12

## 2018-12-14 RX ADMIN — SODIUM CHLORIDE, SODIUM GLUCONATE, SODIUM ACETATE, POTASSIUM CHLORIDE, MAGNESIUM CHLORIDE, SODIUM PHOSPHATE, DIBASIC, AND POTASSIUM PHOSPHATE: .53; .5; .37; .037; .03; .012; .00082 INJECTION, SOLUTION INTRAVENOUS at 07:12

## 2018-12-14 RX ADMIN — PROTAMINE SULFATE 76 MG: 10 INJECTION, SOLUTION INTRAVENOUS at 11:12

## 2018-12-14 RX ADMIN — VASOPRESSIN 2.5 UNITS: 20 INJECTION INTRAVENOUS at 11:12

## 2018-12-14 RX ADMIN — PHENYLEPHRINE HYDROCHLORIDE 100 MCG: 10 INJECTION INTRAVENOUS at 07:12

## 2018-12-14 RX ADMIN — PROTAMINE SULFATE 4 MG: 10 INJECTION, SOLUTION INTRAVENOUS at 11:12

## 2018-12-14 RX ADMIN — PHENYLEPHRINE HYDROCHLORIDE 100 MCG: 10 INJECTION INTRAVENOUS at 11:12

## 2018-12-14 RX ADMIN — LIDOCAINE HYDROCHLORIDE 30 MG: 20 INJECTION, SOLUTION INTRAVENOUS at 07:12

## 2018-12-14 RX ADMIN — MIDAZOLAM 2 MG: 1 INJECTION INTRAMUSCULAR; INTRAVENOUS at 07:12

## 2018-12-14 RX ADMIN — DRONEDARONE 400 MG: 400 TABLET, FILM COATED ORAL at 08:12

## 2018-12-14 RX ADMIN — PANTOPRAZOLE SODIUM 40 MG: 40 TABLET, DELAYED RELEASE ORAL at 05:12

## 2018-12-14 RX ADMIN — ROCURONIUM BROMIDE 10 MG: 10 INJECTION, SOLUTION INTRAVENOUS at 07:12

## 2018-12-14 RX ADMIN — VASOPRESSIN 1 UNITS: 20 INJECTION INTRAVENOUS at 11:12

## 2018-12-14 RX ADMIN — EPINEPHRINE 5 MCG: 1 INJECTION, SOLUTION INTRAMUSCULAR; SUBCUTANEOUS at 11:12

## 2018-12-14 RX ADMIN — FENTANYL CITRATE 50 MCG: 50 INJECTION, SOLUTION INTRAMUSCULAR; INTRAVENOUS at 11:12

## 2018-12-14 RX ADMIN — RIVAROXABAN 20 MG: 20 TABLET, FILM COATED ORAL at 05:12

## 2018-12-14 RX ADMIN — SODIUM CHLORIDE: 0.9 INJECTION, SOLUTION INTRAVENOUS at 10:12

## 2018-12-14 RX ADMIN — ONDANSETRON 4 MG: 2 INJECTION INTRAMUSCULAR; INTRAVENOUS at 11:12

## 2018-12-14 RX ADMIN — NALOXONE HYDROCHLORIDE 0.04 MG: 0.4 INJECTION, SOLUTION INTRAMUSCULAR; INTRAVENOUS; SUBCUTANEOUS at 11:12

## 2018-12-14 RX ADMIN — PHENYLEPHRINE HYDROCHLORIDE 100 MCG: 10 INJECTION INTRAVENOUS at 10:12

## 2018-12-14 RX ADMIN — ACETAMINOPHEN 650 MG: 325 TABLET, FILM COATED ORAL at 05:12

## 2018-12-14 RX ADMIN — SODIUM CHLORIDE 0.3 MCG/KG/MIN: 9 INJECTION, SOLUTION INTRAVENOUS at 07:12

## 2018-12-14 RX ADMIN — FENTANYL CITRATE 100 MCG: 50 INJECTION, SOLUTION INTRAMUSCULAR; INTRAVENOUS at 07:12

## 2018-12-14 NOTE — HPI
"53 year old male with PMHx significant for atrial fibrillation and ASD. He denies any history of HTN, CHF, DM, stroke, TIA, MI, PVD, or atherosclerosis.  Primary cardiologist is Dr. Marie. He presented to his PCP with fatigue and noted to be in atrial fibrillation 10/18. He has noted fatigue increasing in nature over the past year.  Also noted "something not feeling right in my chest." Echo in 9/20/18 showed normal biventricular structure and function mildly dilated RA and LA, sinus venosus ASD < 10 mm. He was started on Xarelto. He underwent a DCCV in 11/6/18 and left the hospital in NSR (EKG confirming NSR after). Patient reported back in atrial fibrillation on 11/7/18. He was referred for evaluation of sleep apnea. Was told he had "mild" sleep apnea, now on CPAP states he is compliant.  JOS completed 12/13/18 showed normal LV systolic function, EF of 63% and no wall motion abnormalities but severe LAE.     He presents on 12/14/18 to [facility] for scheduled for PVI and possible persist end trial with Dr. Agustin. He denied any chest pain, palpitations, SOB, LUND, dizziness, light headedness, weakness, syncope, or near syncopal episodes. He does state he occasional felt "something funny in my chest" possibly palpitations. He denied any bleeding, infections, rashes, or surgeries in the past 30 days. He is currently taking xarelto for stroke prevention (last dose 12/13/18).     On 12/14, he underwent successful PVI but required multiple cardio-versions. During the procedure, pt bite his tongue and blister developed. After the procedure, patient was seen by ENT and no acute interventions were deemed necessary. While observed overnight, patient went into atrial fibrillation with RVR. He was started on Propafenone. It was discussed that the patient will be discharged and return in few days for DCCV.         "

## 2018-12-14 NOTE — SUBJECTIVE & OBJECTIVE
Medications:  Continuous Infusions:  Scheduled Meds:   pantoprazole  40 mg Oral Daily    rivaroxaban  20 mg Oral with dinner     PRN Meds:acetaminophen     No current facility-administered medications on file prior to encounter.      Current Outpatient Medications on File Prior to Encounter   Medication Sig    rivaroxaban (XARELTO) 20 mg Tab Take 20 mg by mouth.       Review of patient's allergies indicates:  No Known Allergies    Past Medical History:   Diagnosis Date    Anticoagulant long-term use     ASD (atrial septal defect)     Atrial fibrillation      History reviewed. No pertinent surgical history.  Family History     None        Tobacco Use    Smoking status: Never Smoker    Smokeless tobacco: Never Used   Substance and Sexual Activity    Alcohol use: Yes     Alcohol/week: 1.2 oz     Types: 2 Glasses of wine per week     Comment: occassional    Drug use: No    Sexual activity: Not on file     Review of Systems  Objective:     Vital Signs (Most Recent):  Temp: 98.5 °F (36.9 °C) (12/14/18 1515)  Pulse: 83 (12/14/18 1615)  Resp: 20 (12/14/18 1515)  BP: 120/68 (12/14/18 1615)  SpO2: 96 % (12/14/18 1515) Vital Signs (24h Range):  Temp:  [97.3 °F (36.3 °C)-98.5 °F (36.9 °C)] 98.5 °F (36.9 °C)  Pulse:  [73-91] 83  Resp:  [12-20] 20  SpO2:  [95 %-100 %] 96 %  BP: (112-130)/(68-88) 120/68     Weight: 113.4 kg (250 lb)  Body mass index is 32.98 kg/m².    Date 12/14/18 0700 - 12/15/18 0659   Shift 3878-8415 3229-4058 4028-9343 24 Hour Total   INTAKE   P.O. 60   60   I.V.(mL/kg) 2400(21.2)   2400(21.2)   Shift Total(mL/kg) 2460(21.7)   2460(21.7)   OUTPUT   Urine(mL/kg/hr) 1800(2) 1200  3000   Shift Total(mL/kg) 1800(15.9) 1200(10.6)  3000(26.5)   Weight (kg) 113.4 113.4 113.4 113.4       Physical Exam  Constitutional: Well appearing / communicating.  NAD.  Eyes: EOM I Bilaterally  Head/Face: Normocephalic.  Negative paranasal sinus pressure/tenderness.  Salivary glands WNL.  House Brackmann I  Bilaterally.  Right Ear: External Auditory Canal WNL,TM w/o masses/lesions/perforations.  Auricle WNL.  Left Ear: External Auditory Canal WNL,TM w/o masses/lesions/perforations. Auricle WNL.  Nose: No gross nasal septal deviation. Inferior Turbinates 2+ bilaterally. No septal perforation. No masses/lesions. External nasal skin without masses/lesions.  Oral Cavity: ecchymosis of left ventral tongue. Mildly tender to palpation. Does not feel particularly edematous. Tongue is normal in size.  Oropharynx: BOT WNL. No masses/lesions noted. Tonsillar fossa/pharyngeal wall without lesions. Posterior oropharynx WNL.  Soft palate without masses. Midline uvula.   Neck/Lymphatic: No LAD I-VI bilaterally.  No thyromegaly.  No masses noted on exam.  Neuro/Psychiatric: AOx3.  Normal mood and affect.   Cardiovascular: Normal carotid pulses bilaterally, no increasing jugular venous distention noted at cervical region bilaterally.    Respiratory: Normal respiratory effort, no stridor, no retractions noted.       Significant Labs:  All pertinent labs from the last 24 hours have been reviewed.    Significant Diagnostics:  I have reviewed and interpreted all pertinent imaging results/findings within the past 24 hours.

## 2018-12-14 NOTE — H&P
"[facility]  Cardiac Electrophysiology  History and Physical     Admission Date: 12/14/2018  Code Status: No Order   Attending Provider: Erick Agustin MD   Principal Problem:Persistent atrial fibrillation    Subjective:     Chief Complaint:  Atrial fibrillation     HPI: Mr. Guadalupe is a 53 year old male with atrial fibrillation and ASD. He denies any history of HTN, CHF, DM, stroke, TIA, MI, PVD, or atherosclerosis.  Primary cardiologist is Dr. Marie.  Presented to PCP with fatigue, noted to be in atrial fibrillation 10/18.  Has noted fatigue increasing in nature over the past year.  Also noted "something not feeling right in my chest."  Echo 9/20/18 normal biventricular structure and function mildly dilated RA and LA, sinus venosus ASD < 10 mm.  Placed on Xarelto.  DCCV 11/6/18.  Left hospital in NSR (ECG confirming NSR after).  Patient back in atrial fibrillation 11/7/18.  Referred for evaluation of sleep apnea. Was told he had "mild" sleep apnea, now on CPAP states he is compliant.  JOS completed 12/13/18 Conclusion:  · Normal left ventricular systolic function. The estimated ejection fraction is 63% on this limited non-Doppler echo.  · No wall motion abnormalities.  · Severe left atrial enlargement.  · Indeterminate left ventricular diastolic function.  · Low normal right ventricular systolic function.  · Mild right ventricular enlargement.  · Moderate right atrial enlargement.  · Normal central venous pressure (3 mm Hg).  · Trivial pericardial effusion.    NYHA score I  [id number] score 0  (Discussed with Dr. Agustin).  He presents today to [facility] for scheduled for PVI and possible persist end trial with Dr. gAustin. He denies any chest pain, palpitations, SOB, LUND, dizziness, light headedness, weakness, syncope, or near syncopal episodes. He does state he occasionally feels "something funny in my chest". He denies any bleeding, infections, rashes, or surgeries in the past 30 days. He is " "currently taking xarelto for stroke prevention (last dose 12/13/18). He denies any limitations of physical activity and denies undue fatigue, palpitations, or dyspnea.    I have personally reviewed the patient's EKG today, which shows AF at 84 bpm. QTc is 433.    Past Medical History:   Diagnosis Date    Anticoagulant long-term use     ASD (atrial septal defect)     Atrial fibrillation      History reviewed. No pertinent surgical history.    Review of patient's allergies indicates:  No Known Allergies    No current facility-administered medications on file prior to encounter.      Current Outpatient Medications on File Prior to Encounter   Medication Sig    rivaroxaban (XARELTO) 20 mg Tab Take 20 mg by mouth.     Family History     None        Tobacco Use    Smoking status: Never Smoker    Smokeless tobacco: Never Used   Substance and Sexual Activity    Alcohol use: Yes     Alcohol/week: 1.2 oz     Types: 2 Glasses of wine per week     Comment: occassional    Drug use: No    Sexual activity: Not on file     Review of Systems   Constitution: Negative. Negative for fevers/chills, weakness and malaise/fatigue.   HENT: Negative.  Negative for ear pain and tinnitus.    Eyes: Negative for blurred vision.   Cardiovascular: Negative. Negative for chest pain, dyspnea on exertion, leg swelling, near-syncope, palpitations and syncope. "Funny feeling in chest".   Respiratory: Negative. Negative for shortness of breath.    Endocrine: Negative.  Negative for polyuria.   Hematologic/Lymphatic: Positive for bruise/bleed easily. Negative for significant bleeding.  Skin: Negative.  Negative for rash.   Musculoskeletal: Negative.  Negative for joint pain and muscle weakness.   Gastrointestinal: Negative.  Negative for abdominal pain hematemesis, hematochezia, and change in bowel habit.   Genitourinary: Negative for frequency or hematuria.   Neurological: Negative.  Negative for dizziness.   Psychiatric/Behavioral: Negative.  " Negative for depression. The patient is not nervous/anxious.       Objective:     Vital Signs (Most Recent):  Temp: 97.3 °F (36.3 °C) (12/14/18 0600)  Pulse: 91 (12/14/18 0600)  Resp: 18 (12/14/18 0600)  BP: 113/71 (12/14/18 0601)  SpO2: 97 % (12/14/18 0600) Vital Signs (24h Range):  Temp:  [97.3 °F (36.3 °C)-98 °F (36.7 °C)] 97.3 °F (36.3 °C)  Pulse:  [84-93] 91  Resp:  [16-20] 18  SpO2:  [95 %-98 %] 97 %  BP: (102-124)/(62-85) 113/71     Weight: 113.4 kg (250 lb)  Body mass index is 32.98 kg/m².    SpO2: 97 %  O2 Device (Oxygen Therapy): room air    Physical Exam   Constitutional: He is oriented to person, place, and time. He appears well-developed and well-nourished.   HENT:   Head: Normocephalic and atraumatic.   Eyes: Conjunctivae, EOM and lids are normal. No scleral icterus.   Neck: Normal range of motion. No JVD present. No tracheal deviation present. No thyromegaly present.   Cardiovascular: Normal rate and intact distal pulses. An irregularly irregular rhythm present. No extrasystoles are present. PMI is not displaced. Exam reveals no gallop and no friction rub. No murmur heard.  Pulses:       Radial pulses are 2+ on the right side, and 2+ on the left side.        Pedal pulses are 1+ on the right side, and 1+ on the left side.   Pulmonary/Chest: Effort normal and breath sounds normal. No accessory muscle usage. No tachypnea. No respiratory distress. He has no wheezes. He has no rales.   Abdominal: Soft. Bowel sounds are normal. He exhibits no distension. There is no hepatosplenomegaly. There is no tenderness.   Musculoskeletal: Normal range of motion. He exhibits no edema.   Neurological: He is alert and oriented to person, place, and time. He has normal reflexes. He exhibits normal muscle tone and strength. Coordination and gait normal. No cranial nerve or sensory deficit.   Skin: Skin is warm and dry. No rash noted.   Psychiatric: He has a normal mood and affect. His behavior is normal.   Nursing note and  vitals reviewed.    Significant Labs: Discussed with Dr. Agustin.  BMP:   Recent Labs   Lab 12/13/18  1127   GLU 95      K 5.2*   *   CO2 18*   BUN 19   CREATININE 1.2   CALCIUM 9.1   , CMP:   Recent Labs   Lab 12/13/18  1127      K 5.2*   *   CO2 18*   GLU 95   BUN 19   CREATININE 1.2   CALCIUM 9.1   ANIONGAP 12   ESTGFRAFRICA >60.0   EGFRNONAA >60.0   , CBC:   Recent Labs   Lab 12/13/18  1127   WBC 4.89   HGB 15.8   HCT 46.2       and INR:   Recent Labs   Lab 12/13/18  1127   INR 1.1     Significant Imaging: EKG: AF 86 bpm.    Assessment and Plan:     * Persistent atrial fibrillation    -PVI RFA possible persist end trial.  -Anesthesia for sedation.     Prior to procedure, extensive discussion with patient regarding risks and benefits of PVI, and patient  would like to proceed.  Risks of procedure include but are not limited to the risk of infection, bleeding, stroke, paralysis,pulmonary vein stenosis, atrioesophageal fistula  MI, death, embolism, perforation requiring emergency draining or surgery, AV block, possibility for need for  pacemaker implantation. Dr. Agustin at bedside answering all questions and addressing all concerns.   The patient and wife voice understanding and all questions have been answered. No further questions/concerns voiced at this time.      Carolyn Nelson, BECCA  Cardiac Electrophysiology  [facility]    Attending: Erick Agustin MD

## 2018-12-14 NOTE — ANESTHESIA POSTPROCEDURE EVALUATION
"Anesthesia Post Evaluation    Patient: Juan Luis Guadalupe    Procedure(s) Performed: Procedure(s) (LRB):  ABLATION, ARRHYTHMOGENIC FOCUS, FOR ATRIAL FIBRILLATION (N/A)  Cardioversion or Defibrillation    Final Anesthesia Type: general  Patient location during evaluation: PACU  Patient participation: Yes- Able to Participate  Level of consciousness: awake and alert  Post-procedure vital signs: reviewed and stable  Pain management: adequate  Airway patency: patent  PONV status at discharge: No PONV  Anesthetic complications: yes  Perioperative Events: other periop events (see comments)  Alurie-operative Events Comments: He somehow developed a blood blister under his tongue.  I think he somehow bit it.  I am having ENT see him today because it's Friday.     Cardiovascular status: hemodynamically stable  Respiratory status: unassisted  Hydration status: euvolemic  Follow-up not needed.        Visit Vitals  /81 (BP Location: Right arm, Patient Position: Lying)   Pulse 80   Temp 36.3 °C (97.3 °F) (Temporal)   Resp 20   Ht 6' 1" (1.854 m)   Wt 113.4 kg (250 lb)   SpO2 98%   BMI 32.98 kg/m²       Pain/Kimberly Score: Pain Rating Prior to Med Admin: 0 (12/14/2018  2:00 PM)  Kimberly Score: 9 (12/14/2018  2:30 PM)        "

## 2018-12-14 NOTE — PLAN OF CARE
Problem: Adult Inpatient Plan of Care  Goal: Plan of Care Review  Outcome: Ongoing (interventions implemented as appropriate)  Report received from CHRIS Che. Patient s/p PVI. bilat groins with sutures cdi. No bleeding or hematoma noted. Post procedure protocol discussed with patient. Call light in reach. Will monitor.

## 2018-12-14 NOTE — SUBJECTIVE & OBJECTIVE
Past Medical History:   Diagnosis Date    Anticoagulant long-term use     ASD (atrial septal defect)     Atrial fibrillation        History reviewed. No pertinent surgical history.    Review of patient's allergies indicates:  No Known Allergies    No current facility-administered medications on file prior to encounter.      Current Outpatient Medications on File Prior to Encounter   Medication Sig    rivaroxaban (XARELTO) 20 mg Tab Take 20 mg by mouth.     Family History     None        Tobacco Use    Smoking status: Never Smoker    Smokeless tobacco: Never Used   Substance and Sexual Activity    Alcohol use: Yes     Alcohol/week: 1.2 oz     Types: 2 Glasses of wine per week     Comment: occassional    Drug use: No    Sexual activity: Not on file     Review of Systems   Constitution: Negative for chills.   HENT: Negative for congestion.    Cardiovascular: Negative for chest pain, dyspnea on exertion, leg swelling, orthopnea and syncope.   Respiratory: Negative for cough, shortness of breath and wheezing.    Endocrine: Negative for polyphagia.   Musculoskeletal: Negative for back pain and muscle cramps.   Gastrointestinal: Negative for bloating and abdominal pain.   Genitourinary: Negative for dysuria.   Neurological: Negative for dizziness, headaches and loss of balance.   Psychiatric/Behavioral: Negative for altered mental status.     Objective:     Vital Signs (Most Recent):  Temp: 97.3 °F (36.3 °C) (12/14/18 0600)  Pulse: 91 (12/14/18 0600)  Resp: 18 (12/14/18 0600)  BP: 113/71 (12/14/18 0601)  SpO2: 97 % (12/14/18 0600) Vital Signs (24h Range):  Temp:  [97.3 °F (36.3 °C)-98 °F (36.7 °C)] 97.3 °F (36.3 °C)  Pulse:  [84-93] 91  Resp:  [16-20] 18  SpO2:  [95 %-98 %] 97 %  BP: (102-124)/(62-85) 113/71       Weight: 113.4 kg (250 lb)  Body mass index is 32.98 kg/m².    SpO2: 97 %  O2 Device (Oxygen Therapy): room air    Physical Exam   Constitutional: He is oriented to person, place, and time. He appears  well-developed and well-nourished.   HENT:   Head: Normocephalic and atraumatic.   Eyes: Conjunctivae are normal. Pupils are equal, round, and reactive to light.   Neck: Normal range of motion. Neck supple.   Cardiovascular: Normal rate, regular rhythm, normal heart sounds and intact distal pulses. Exam reveals no gallop and no friction rub.   No murmur heard.  Pulmonary/Chest: Effort normal and breath sounds normal. No respiratory distress. He has no wheezes. He has no rales. He exhibits no tenderness.   Abdominal: Bowel sounds are normal. He exhibits no distension and no mass. There is no tenderness. There is no rebound and no guarding.   Musculoskeletal: Normal range of motion. He exhibits no edema.   Neurological: He is alert and oriented to person, place, and time.   Skin: Skin is warm and dry. He is not diaphoretic.   Psychiatric: He has a normal mood and affect.       Significant Labs:   BMP:   Recent Labs   Lab 12/13/18  1127   GLU 95      K 5.2*   *   CO2 18*   BUN 19   CREATININE 1.2   CALCIUM 9.1   , CMP:   Recent Labs   Lab 12/13/18  1127      K 5.2*   *   CO2 18*   GLU 95   BUN 19   CREATININE 1.2   CALCIUM 9.1   ANIONGAP 12   ESTGFRAFRICA >60.0   EGFRNONAA >60.0   , CBC:   Recent Labs   Lab 12/13/18  1127   WBC 4.89   HGB 15.8   HCT 46.2       and INR:   Recent Labs   Lab 12/13/18  1127   INR 1.1       Significant Imaging: Echocardiogram: 2D echo with color flow doppler: No results found for this or any previous visit., Transthoracic echo (TTE) complete (Cupid Only): No results found for this or any previous visit. and **, Ejection fraction: No results for input(s): EF in the last 168 hours. and EKG: **

## 2018-12-14 NOTE — PLAN OF CARE
Vss. sr noted. 12 lead done per md order. Pt denies pain to groin areas.  During procedure, pt accidentally bit tongue.  Noted with huge lesion under tongue, red.  Anesthesia dr camejo and ashley oreilly ep np aware and assessed pt.  ent to be consulted by dr camejo anesthesia. [facility] rn aware.  Pt with complaints of discomfort. Ice given to pt to place under tongue.silvana groin sutures in place no hematoma or drainage noted.  Doppler pulses noted ble.  Pt tolerated x1 lasix 20mg ivp per md order.  Pt voided per urinal x2 clear yellow urine noted. Bedrest x6hrs.  See flowsheet for full assessment.

## 2018-12-14 NOTE — ASSESSMENT & PLAN NOTE
52 yo male with tongue ecchymosis/bruising. No difficulty breathing. Tongue is not very swollen at present, and appears to be stable in size per history.    -no acute ENT intervention at this time- process should self resolve  -pain control PRN   -please call back if there is an increase in tongue swelling

## 2018-12-14 NOTE — HPI
54 yo male hx of afib s/p PVI RFA with cardiology today. After the procedure, the patient was noted to have a large blood blister underneath his tongue. He says the lesion has not changed in size since he noticed it a few hours ago. No difficulty breathing, swallowing. Mildly painful.

## 2018-12-14 NOTE — Clinical Note
3 ml injected throughout the case. 97 mL total wasted during the case. 100 mL total used in the case.

## 2018-12-14 NOTE — TRANSFER OF CARE
"Anesthesia Transfer of Care Note    Patient: Juan Luis Guadalupe    Procedure(s) Performed: Procedure(s) (LRB):  ABLATION, ARRHYTHMOGENIC FOCUS, FOR ATRIAL FIBRILLATION (N/A)  Cardioversion or Defibrillation    Patient location: PACU    Anesthesia Type: general    Transport from OR: Transported from OR on 6-10 L/min O2 by face mask with adequate spontaneous ventilation    Post pain: adequate analgesia    Post assessment: no apparent anesthetic complications and tolerated procedure well    Post vital signs: stable    Level of consciousness: awake, alert and oriented    Nausea/Vomiting: no nausea/vomiting    Complications: none    Transfer of care protocol was followed      Last vitals:   Visit Vitals  /71 (BP Location: Right arm, Patient Position: Lying)   Pulse 91   Temp 36.3 °C (97.3 °F) (Oral)   Resp 18   Ht 6' 1" (1.854 m)   Wt 113.4 kg (250 lb)   SpO2 97%   BMI 32.98 kg/m²     "

## 2018-12-14 NOTE — ANESTHESIA PREPROCEDURE EVALUATION
12/13/2018  Juan Luis Guadalupe is a 53 y.o., male   Patient Active Problem List   Diagnosis    Persistent atrial fibrillation    ASD (atrial septal defect)     .    Anesthesia Evaluation         Review of Systems      Physical Exam  General:  Well nourished    Airway/Jaw/Neck:  Airway Findings: Mouth Opening: Normal Tongue: Normal  General Airway Assessment: Adult  Mallampati: II  Improves to II with phonation.  TM Distance: Normal, at least 6 cm      Dental:  Dental Findings: In tact   Chest/Lungs:  Chest/Lungs Findings: Clear to auscultation     Heart/Vascular:  Heart Findings: Rate: Normal  Rhythm: Irregularly Irregular  Sounds: Normal        Mental Status:  Mental Status Findings:  Cooperative, Alert and Oriented         Anesthesia Plan  Type of Anesthesia, risks & benefits discussed:  Anesthesia Type:  general  Patient's Preference: General  Intra-op Monitoring Plan: standard ASA monitors and arterial line  Intra-op Monitoring Plan Comments: Standard ASA monitors.   Post Op Pain Control Plan: per primary service following discharge from PACU  Post Op Pain Control Plan Comments: Per primary service.     Induction:   IV  Beta Blocker:  Patient is not currently on a Beta-Blocker (No further documentation required).       Informed Consent: Patient understands risks and agrees with Anesthesia plan.  Questions answered. Anesthesia consent signed with patient.  ASA Score: 3     Day of Surgery Review of History & Physical:    H&P update referred to the surgeon.     Anesthesia Plan Notes: Chart reviewed, patient interviewed and examined.  The plan for general anesthesia was explained.  Questions were answered and the consent was signed.  Arina CABRERA         Ready For Surgery From Anesthesia Perspective.

## 2018-12-14 NOTE — NURSING TRANSFER
Nursing Transfer Note      12/14/2018     Transfer To: ep pacu 4 to [facility] 318    Transfer via stretcher    Transfer with cardiac monitoring, tele box [id number]    Transported by darian,matt    Medicines sent: silvadene ointment    Chart send with patient: Yes    Notified: ricciselma    Patient reassessed at: 12/14/18 1500. Next due 1530    Upon arrival to floor: cardiac monitor applied, patient oriented to room, call bell in reach and bed in lowest position

## 2018-12-14 NOTE — CONSULTS
[facility]  Otorhinolaryngology-Head & Neck Surgery  Consult Note    Patient Name: Juan Luis Guadalupe  MRN: [id number]  Code Status: No Order  Admission Date: 12/14/2018  Hospital Length of Stay: 0 days  Attending Physician: Erick Agustin MD  Primary Care Provider: Primary Doctor No    Patient information was obtained from patient.     Inpatient consult to ENT  Consult performed by: Mateo Bond Jr., MD  Consult ordered by: Carolyn Nelson NP        Subjective:     Chief Complaint/Reason for Admission: tongue lesion    History of Present Illness: 54 yo male hx of afib s/p PVI RFA with cardiology today. After the procedure, the patient was noted to have a large blood blister underneath his tongue. He says the lesion has not changed in size since he noticed it a few hours ago. No difficulty breathing, swallowing. Mildly painful.    Medications:  Continuous Infusions:  Scheduled Meds:   pantoprazole  40 mg Oral Daily    rivaroxaban  20 mg Oral with dinner     PRN Meds:acetaminophen     No current facility-administered medications on file prior to encounter.      Current Outpatient Medications on File Prior to Encounter   Medication Sig    rivaroxaban (XARELTO) 20 mg Tab Take 20 mg by mouth.       Review of patient's allergies indicates:  No Known Allergies    Past Medical History:   Diagnosis Date    Anticoagulant long-term use     ASD (atrial septal defect)     Atrial fibrillation      History reviewed. No pertinent surgical history.  Family History     None        Tobacco Use    Smoking status: Never Smoker    Smokeless tobacco: Never Used   Substance and Sexual Activity    Alcohol use: Yes     Alcohol/week: 1.2 oz     Types: 2 Glasses of wine per week     Comment: occassional    Drug use: No    Sexual activity: Not on file     Review of Systems  Objective:     Vital Signs (Most Recent):  Temp: 98.5 °F (36.9 °C) (12/14/18 1515)  Pulse: 83 (12/14/18 1615)  Resp: 20 (12/14/18  1515)  BP: 120/68 (12/14/18 1615)  SpO2: 96 % (12/14/18 1515) Vital Signs (24h Range):  Temp:  [97.3 °F (36.3 °C)-98.5 °F (36.9 °C)] 98.5 °F (36.9 °C)  Pulse:  [73-91] 83  Resp:  [12-20] 20  SpO2:  [95 %-100 %] 96 %  BP: (112-130)/(68-88) 120/68     Weight: 113.4 kg (250 lb)  Body mass index is 32.98 kg/m².    Date 12/14/18 0700 - 12/15/18 0659   Shift 1770-1865 4587-5301 9502-0666 24 Hour Total   INTAKE   P.O. 60   60   I.V.(mL/kg) 2400(21.2)   2400(21.2)   Shift Total(mL/kg) 2460(21.7)   2460(21.7)   OUTPUT   Urine(mL/kg/hr) 1800(2) 1200  3000   Shift Total(mL/kg) 1800(15.9) 1200(10.6)  3000(26.5)   Weight (kg) 113.4 113.4 113.4 113.4       Physical Exam  Constitutional: Well appearing / communicating.  NAD.  Eyes: EOM I Bilaterally  Head/Face: Normocephalic.  Negative paranasal sinus pressure/tenderness.  Salivary glands WNL.  House Brackmann I Bilaterally.  Right Ear: External Auditory Canal WNL,TM w/o masses/lesions/perforations.  Auricle WNL.  Left Ear: External Auditory Canal WNL,TM w/o masses/lesions/perforations. Auricle WNL.  Nose: No gross nasal septal deviation. Inferior Turbinates 2+ bilaterally. No septal perforation. No masses/lesions. External nasal skin without masses/lesions.  Oral Cavity: ecchymosis of left ventral tongue. Mildly tender to palpation. Does not feel particularly edematous. Tongue is normal in size.  Oropharynx: BOT WNL. No masses/lesions noted. Tonsillar fossa/pharyngeal wall without lesions. Posterior oropharynx WNL.  Soft palate without masses. Midline uvula.   Neck/Lymphatic: No LAD I-VI bilaterally.  No thyromegaly.  No masses noted on exam.  Neuro/Psychiatric: AOx3.  Normal mood and affect.   Cardiovascular: Normal carotid pulses bilaterally, no increasing jugular venous distention noted at cervical region bilaterally.    Respiratory: Normal respiratory effort, no stridor, no retractions noted.       Significant Labs:  All pertinent labs from the last 24 hours have been  reviewed.    Significant Diagnostics:  I have reviewed and interpreted all pertinent imaging results/findings within the past 24 hours.    Assessment/Plan:     Tongue lesion    54 yo male with tongue ecchymosis/bruising. No difficulty breathing. Tongue is not very swollen at present, and appears to be stable in size per history.    -no acute ENT intervention at this time- process should self resolve  -pain control PRN   -please call back if there is an increase in tongue swelling       VTE Risk Mitigation (From admission, onward)        Ordered     rivaroxaban tablet 20 mg  with dinner      12/14/18 9348          Thank you for your consult. I will sign off. Please contact us if you have any additional questions.    Mateo Bond Jr., MD  Otorhinolaryngology-Head & Neck Surgery  [facility]

## 2018-12-14 NOTE — ANESTHESIA PROCEDURE NOTES
Arterial    Diagnosis: a fib    Patient location during procedure: done in OR  Procedure start time: 12/14/2018 7:35 AM  Procedure end time: 12/14/2018 7:37 AM  Staffing  Anesthesiologist: Dale Weathers MD  Performed: anesthesiologist   Anesthesiologist was present at the time of the procedure.  Preanesthetic Checklist  Completed: patient identified, site marked, surgical consent, pre-op evaluation, timeout performed, IV checked, risks and benefits discussed, monitors and equipment checked and anesthesia consent givenArterial  Skin Prep: chlorhexidine gluconate  Local Infiltration: none  Orientation: right  Location: radial  Catheter Size: 20 GInsertion Attempts: 1  Assessment  Dressing: secured with tape and tegaderm  Patient: Tolerated well

## 2018-12-14 NOTE — PLAN OF CARE
Problem: Adult Inpatient Plan of Care  Goal: Plan of Care Review  Outcome: Ongoing (interventions implemented as appropriate)  Patient arrived to room. PIV placed. Admit assessment completed. Plan of care discussed with patient. Will monitor

## 2018-12-14 NOTE — PROGRESS NOTES
bilat groin sutures removed per order. No bleeding or hematoma noted. Dressings applied. Will monitor.

## 2018-12-15 VITALS
HEIGHT: 73 IN | BODY MASS INDEX: 33.13 KG/M2 | SYSTOLIC BLOOD PRESSURE: 127 MMHG | TEMPERATURE: 98 F | WEIGHT: 250 LBS | DIASTOLIC BLOOD PRESSURE: 86 MMHG | HEART RATE: 92 BPM | OXYGEN SATURATION: 99 % | RESPIRATION RATE: 20 BRPM

## 2018-12-15 PROCEDURE — G0378 HOSPITAL OBSERVATION PER HR: HCPCS

## 2018-12-15 PROCEDURE — 25000003 PHARM REV CODE 250

## 2018-12-15 PROCEDURE — 25000003 PHARM REV CODE 250: Performed by: STUDENT IN AN ORGANIZED HEALTH CARE EDUCATION/TRAINING PROGRAM

## 2018-12-15 PROCEDURE — 25000003 PHARM REV CODE 250: Performed by: NURSE PRACTITIONER

## 2018-12-15 RX ORDER — PROPAFENONE HYDROCHLORIDE 150 MG/1
325 TABLET, COATED ORAL 2 TIMES DAILY
Status: DISCONTINUED | OUTPATIENT
Start: 2018-12-15 | End: 2018-12-15 | Stop reason: HOSPADM

## 2018-12-15 RX ORDER — PROPAFENONE HYDROCHLORIDE 300 MG/1
325 TABLET, COATED ORAL 2 TIMES DAILY
Qty: 60 TABLET | Refills: 11 | Status: SHIPPED | OUTPATIENT
Start: 2018-12-15 | End: 2018-12-15

## 2018-12-15 RX ORDER — PROPAFENONE HYDROCHLORIDE 300 MG/1
300 TABLET, COATED ORAL EVERY 8 HOURS
Qty: 90 TABLET | Refills: 11 | Status: SHIPPED | OUTPATIENT
Start: 2018-12-15 | End: 2019-06-12

## 2018-12-15 RX ORDER — PANTOPRAZOLE SODIUM 40 MG/1
40 TABLET, DELAYED RELEASE ORAL DAILY
Qty: 30 TABLET | Refills: 0 | Status: SHIPPED | OUTPATIENT
Start: 2018-12-15 | End: 2019-01-30

## 2018-12-15 RX ADMIN — PROPAFENONE HYDROCHLORIDE 300 MG: 150 TABLET, FILM COATED ORAL at 11:12

## 2018-12-15 RX ADMIN — PROPAFENONE HYDROCHLORIDE 225 MG: 150 TABLET, FILM COATED ORAL at 05:12

## 2018-12-15 RX ADMIN — ACETAMINOPHEN 650 MG: 325 TABLET, FILM COATED ORAL at 02:12

## 2018-12-15 RX ADMIN — ACETAMINOPHEN 650 MG: 325 TABLET, FILM COATED ORAL at 07:12

## 2018-12-15 NOTE — PROGRESS NOTES
[facility]  Cardiac Electrophysiology  Progress Note    Admission Date: 12/14/2018  Code Status: Full Code   Attending Physician: Erick Agustin MD   Principal Problem:Persistent atrial fibrillation    Subjective:     Interval History: Overnight, pt went into atrial fibrillation with RVR, HR's in 110's. He was started on Propafenone. Pt seen by ENT given that he was multiple times cardioverted during PVI RFA procedure and had a blister on tongue. ENT recommended no acute interventions.     Review of Systems   Constitution: Negative for weakness and malaise/fatigue.   Cardiovascular: Negative for chest pain, dyspnea on exertion, irregular heartbeat, leg swelling, near-syncope, orthopnea, palpitations, paroxysmal nocturnal dyspnea and syncope.   Respiratory: Negative for shortness of breath.    Gastrointestinal: Negative for abdominal pain, nausea and vomiting.   Neurological: Negative for dizziness, focal weakness, headaches and light-headedness.     Objective:     Vital Signs (Most Recent):  Temp: 98 °F (36.7 °C) (12/15/18 0700)  Pulse: 92 (12/15/18 0743)  Resp: 20 (12/15/18 0700)  BP: 127/86 (12/15/18 0700)  SpO2: 99 % (12/15/18 0700) Vital Signs (24h Range):  Temp:  [97.3 °F (36.3 °C)-98.5 °F (36.9 °C)] 98 °F (36.7 °C)  Pulse:  [] 92  Resp:  [12-20] 20  SpO2:  [95 %-100 %] 99 %  BP: ()/(68-88) 127/86     Weight: 113.4 kg (250 lb)  Body mass index is 32.98 kg/m².     SpO2: 99 %  O2 Device (Oxygen Therapy): CPAP    Physical Exam   Constitutional: He is oriented to person, place, and time.   HENT:   Mouth/Throat: Oropharynx is clear and moist.   Eyes: Pupils are equal, round, and reactive to light.   Neck: No JVD present.   Cardiovascular: Intact distal pulses.   Irregularly irregular, bilateral groin site for access intact, no tenderness to palpation, no evidence of hematoma   Abdominal: Soft.   Musculoskeletal: He exhibits no edema.   Neurological: He is alert and oriented to person,  place, and time.   Skin: Skin is warm and dry.   Psychiatric: He has a normal mood and affect. His behavior is normal.       Significant Labs: All pertinent lab results from the last 24 hours have been reviewed.      Assessment and Plan:     * Persistent atrial fibrillation    -s/p PVI RFA on 12/14/18   -c/w AC (Xarelto), CHADS-VASC score 5  -c/w Propafenone (dose/frequency changed from 225 mg TID to 325 mg BID SR)  -pt will be discharged home today and be NPO this Sunday night in anticipation for DCCV this upcoming Monday     Blanca Ferrara MD  Cardiac Electrophysiology  [facility]

## 2018-12-15 NOTE — ASSESSMENT & PLAN NOTE
-s/p PVI RFA on 12/14/18   -c/w AC (Xarelto), CHADS-VASC score  -c/w Propafenone (dose/frequency changed from 225 mg TID to 375 mg BID SR)  -pt will be discharged home today and be NPO this Sunday night in anticipation for DCCV this upcoming Monday

## 2018-12-15 NOTE — PLAN OF CARE
"12/14/2018  7:33 PM    Paged with follow-up EKG, patient back in AF.    Tele and EKG both show AF with HR in the 110s.    /68 (BP Location: Right arm, Patient Position: Lying)   Pulse 109   Temp 98.5 °F (36.9 °C) (Oral)   Resp 20   Ht 6' 1" (1.854 m)   Wt 113.4 kg (250 lb)   SpO2 96%   BMI 32.98 kg/m²   General: asymptomatic, feeling well  Groin: Soft bilaterally, left groin mildly tender, no bleeding    Assessment:  1. Recurrent AF despite PVI -- will start propafenone 225mg q8h for rhythm control, continue xarelto. Possible DCCV on Monday if AF persists.    Reviewed with Dr. Agustin.    Donte Colón   "

## 2018-12-15 NOTE — NURSING
Awake and alert.  C/o pain to tongue.  Bruising noted to underside of tongue.  Orders noted for pain medication.  Denies SOB.  Resp even and unlabored.  Bilateral groin dressings clean dry and intact.  Right and left groin soft.  No bleeding or hematoma noted.  Will continue to monitor.

## 2018-12-15 NOTE — NURSING
IV's d/c'd x 2 with cath tips intact.  Pt and pt significant other verbalized understanding of d/c instructions. Right and left groin remain soft.  No bleeding or hematoma noted.

## 2018-12-15 NOTE — DISCHARGE SUMMARY
"[facility]  Cardiac Electrophysiology  Discharge Summary      Patient Name: Juan Luis Guadalupe  MRN: [id number]  Admission Date: 12/14/2018  Hospital Length of Stay: 0 days  Discharge Date and Time: 12/15/18 08:24 AM   Attending Physician: Erick Agustin MD    Discharging Provider: Blanca Ferrara MD  Primary Care Physician: Primary Doctor No    HPI/Hospital Course:   53 year old male with PMHx significant for atrial fibrillation and ASD. He denies any history of HTN, CHF, DM, stroke, TIA, MI, PVD, or atherosclerosis. Primary cardiologist is Dr. Marie. He presented to his PCP with fatigue and noted to be in atrial fibrillation 10/18. He has noted fatigue increasing in nature over the past year.  Also noted "something not feeling right in my chest." Echo in 9/20/18 showed normal biventricular structure and function mildly dilated RA and LA, sinus venosus ASD < 10 mm. He was started on Xarelto. He underwent a DCCV in 11/6/18 and left the hospital in NSR (EKG confirming NSR after). Patient reported back in atrial fibrillation on 11/7/18. He was referred for evaluation of sleep apnea. Was told he had "mild" sleep apnea, now on CPAP states he is compliant. JOS completed 12/13/18 showed normal LV systolic function, EF of 63% and no wall motion abnormalities but severe LAE.     He presented on 12/14/18 to [facility] for scheduled for PVI and possible persist end trial with Dr. Agustin. He denied any chest pain, palpitations, SOB, LUND, dizziness, light headedness, weakness, syncope, or near syncopal episodes. He does state he occasional felt "something funny in my chest" possibly palpitations. He denied any bleeding, infections, rashes, or surgeries in the past 30 days. He is currently taking xarelto for stroke prevention (last dose 12/13/18).     On 12/14, he underwent successful PVI but required multiple cardio-versions. During the procedure, pt bite his tongue and blister developed. After the procedure, " patient was seen by ENT and no acute interventions were deemed necessary. While observed overnight, patient went into atrial fibrillation with RVR. He was started on Propafenone. It was discussed that the patient will be discharged and return in few days for DCCV.     Procedure(s) (LRB):  ABLATION, ARRHYTHMOGENIC FOCUS, FOR ATRIAL FIBRILLATION (N/A)  Cardioversion or Defibrillation     Final Active Diagnoses:    Diagnosis Date Noted POA    PRINCIPAL PROBLEM:  Persistent atrial fibrillation [I48.1] 11/07/2018 Yes    Tongue lesion [K14.8] 12/14/2018 Unknown      Problems Resolved During this Admission:       Discharged Condition: stable    Disposition: home     Follow Up:  Follow-up Information     Call Erick Agustin MD.    Specialties:  Electrophysiology, Cardiology  Why:  On Monday, 12/17 to schedule cardioversion. Please do not eat anything Sunday night (after mid night)   Contact information:  [address]  [facility] [address]  [phone]             Medications:  Reconciled Home Medications:      Medication List      START taking these medications    pantoprazole 40 MG tablet  Commonly known as:  PROTONIX  Take 1 tablet (40 mg total) by mouth once daily.     propafenone 300 MG tablet  Commonly known as:  RYTHMOL  Take 1 tablet (300 mg total) by mouth 2 (two) times daily.    (Notified pt's O/P pharmacy to prescribe 325 mg BID SR tablets of propafenone)      CONTINUE taking these medications    rivaroxaban 20 mg Tab  Commonly known as:  XARELTO  Take 20 mg by mouth.          Blanca Ferrara MD  Cardiac Electrophysiology  [facility]

## 2018-12-15 NOTE — SUBJECTIVE & OBJECTIVE
Interval History: Overnight, pt went into atrial fibrillation with RVR, HR's in 110's. He was started on Propafenone. Pt seen by ENT given that he was multiple times cardioverted during PVI RFA procedure and had a blister on tongue. ENT recommended no acute interventions.     Review of Systems   Constitution: Negative for weakness and malaise/fatigue.   Cardiovascular: Negative for chest pain, dyspnea on exertion, irregular heartbeat, leg swelling, near-syncope, orthopnea, palpitations, paroxysmal nocturnal dyspnea and syncope.   Respiratory: Negative for shortness of breath.    Gastrointestinal: Negative for abdominal pain, nausea and vomiting.   Neurological: Negative for dizziness, focal weakness, headaches and light-headedness.     Objective:     Vital Signs (Most Recent):  Temp: 98 °F (36.7 °C) (12/15/18 0700)  Pulse: 92 (12/15/18 0743)  Resp: 20 (12/15/18 0700)  BP: 127/86 (12/15/18 0700)  SpO2: 99 % (12/15/18 0700) Vital Signs (24h Range):  Temp:  [97.3 °F (36.3 °C)-98.5 °F (36.9 °C)] 98 °F (36.7 °C)  Pulse:  [] 92  Resp:  [12-20] 20  SpO2:  [95 %-100 %] 99 %  BP: ()/(68-88) 127/86     Weight: 113.4 kg (250 lb)  Body mass index is 32.98 kg/m².     SpO2: 99 %  O2 Device (Oxygen Therapy): CPAP    Physical Exam   Constitutional: He is oriented to person, place, and time.   HENT:   Mouth/Throat: Oropharynx is clear and moist.   Eyes: Pupils are equal, round, and reactive to light.   Neck: No JVD present.   Cardiovascular: Intact distal pulses.   Irregularly irregular, bilateral groin site for access intact, no tenderness to palpation, no evidence of hematoma   Abdominal: Soft.   Musculoskeletal: He exhibits no edema.   Neurological: He is alert and oriented to person, place, and time.   Skin: Skin is warm and dry.   Psychiatric: He has a normal mood and affect. His behavior is normal.       Significant Labs: All pertinent lab results from the last 24 hours have been reviewed.

## 2018-12-17 ENCOUNTER — HOSPITAL ENCOUNTER (OUTPATIENT)
Facility: HOSPITAL | Age: 53
Discharge: HOME | End: 2018-12-17
Attending: INTERNAL MEDICINE | Admitting: INTERNAL MEDICINE
Payer: COMMERCIAL

## 2018-12-17 ENCOUNTER — ANESTHESIA EVENT (OUTPATIENT)
Dept: MEDSURG UNIT | Facility: HOSPITAL | Age: 53
End: 2018-12-17
Payer: COMMERCIAL

## 2018-12-17 ENCOUNTER — ANESTHESIA (OUTPATIENT)
Dept: MEDSURG UNIT | Facility: HOSPITAL | Age: 53
End: 2018-12-17
Payer: COMMERCIAL

## 2018-12-17 VITALS
OXYGEN SATURATION: 98 % | RESPIRATION RATE: 18 BRPM | TEMPERATURE: 97 F | HEART RATE: 81 BPM | DIASTOLIC BLOOD PRESSURE: 75 MMHG | SYSTOLIC BLOOD PRESSURE: 135 MMHG

## 2018-12-17 DIAGNOSIS — I48.19 ATRIAL FIBRILLATION, PERSISTENT: ICD-10-CM

## 2018-12-17 DIAGNOSIS — I48.19 PERSISTENT ATRIAL FIBRILLATION: Primary | ICD-10-CM

## 2018-12-17 LAB
ANION GAP SERPL CALC-SCNC: 8 MMOL/L
BUN SERPL-MCNC: 17 MG/DL
CALCIUM SERPL-MCNC: 9.1 MG/DL
CHLORIDE SERPL-SCNC: 105 MMOL/L
CO2 SERPL-SCNC: 25 MMOL/L
CREAT SERPL-MCNC: 1.3 MG/DL
EST. GFR  (AFRICAN AMERICAN): >60 ML/MIN/1.73 M^2
EST. GFR  (NON AFRICAN AMERICAN): >60 ML/MIN/1.73 M^2
GLUCOSE SERPL-MCNC: 92 MG/DL
POTASSIUM SERPL-SCNC: 4.3 MMOL/L
SODIUM SERPL-SCNC: 138 MMOL/L

## 2018-12-17 PROCEDURE — 25000003 PHARM REV CODE 250: Performed by: NURSE ANESTHETIST, CERTIFIED REGISTERED

## 2018-12-17 PROCEDURE — 37000008 HC ANESTHESIA 1ST 15 MINUTES: Performed by: INTERNAL MEDICINE

## 2018-12-17 PROCEDURE — 71000033 HC RECOVERY, INTIAL HOUR: Performed by: INTERNAL MEDICINE

## 2018-12-17 PROCEDURE — 93010 ELECTROCARDIOGRAM REPORT: CPT | Mod: ,,, | Performed by: INTERNAL MEDICINE

## 2018-12-17 PROCEDURE — 37000009 HC ANESTHESIA EA ADD 15 MINS: Performed by: INTERNAL MEDICINE

## 2018-12-17 PROCEDURE — 93005 ELECTROCARDIOGRAM TRACING: CPT

## 2018-12-17 PROCEDURE — 92960 CARDIOVERSION ELECTRIC EXT: CPT | Mod: ,,, | Performed by: INTERNAL MEDICINE

## 2018-12-17 PROCEDURE — 63600175 PHARM REV CODE 636 W HCPCS: Performed by: NURSE ANESTHETIST, CERTIFIED REGISTERED

## 2018-12-17 PROCEDURE — 80048 BASIC METABOLIC PNL TOTAL CA: CPT

## 2018-12-17 PROCEDURE — D9220A PRA ANESTHESIA: Mod: ,,, | Performed by: ANESTHESIOLOGY

## 2018-12-17 PROCEDURE — 92960 CARDIOVERSION ELECTRIC EXT: CPT | Performed by: INTERNAL MEDICINE

## 2018-12-17 RX ORDER — SODIUM CHLORIDE 9 MG/ML
INJECTION, SOLUTION INTRAVENOUS CONTINUOUS PRN
Status: DISCONTINUED | OUTPATIENT
Start: 2018-12-17 | End: 2018-12-17

## 2018-12-17 RX ORDER — LIDOCAINE HCL/PF 100 MG/5ML
SYRINGE (ML) INTRAVENOUS
Status: DISCONTINUED | OUTPATIENT
Start: 2018-12-17 | End: 2018-12-17

## 2018-12-17 RX ORDER — PROPOFOL 10 MG/ML
VIAL (ML) INTRAVENOUS
Status: DISCONTINUED | OUTPATIENT
Start: 2018-12-17 | End: 2018-12-17

## 2018-12-17 RX ORDER — SODIUM CHLORIDE 0.9 % (FLUSH) 0.9 %
3 SYRINGE (ML) INJECTION
Status: DISCONTINUED | OUTPATIENT
Start: 2018-12-17 | End: 2018-12-17 | Stop reason: HOSPADM

## 2018-12-17 RX ADMIN — PROPOFOL 80 MG: 10 INJECTION, EMULSION INTRAVENOUS at 12:12

## 2018-12-17 RX ADMIN — LIDOCAINE HYDROCHLORIDE 100 MG: 20 INJECTION, SOLUTION INTRAVENOUS at 12:12

## 2018-12-17 RX ADMIN — SODIUM CHLORIDE: 0.9 INJECTION, SOLUTION INTRAVENOUS at 12:12

## 2018-12-17 RX ADMIN — PROPOFOL 40 MG: 10 INJECTION, EMULSION INTRAVENOUS at 12:12

## 2018-12-17 NOTE — PLAN OF CARE
Problem: Adult Inpatient Plan of Care  Goal: Plan of Care Review  Outcome: Ongoing (interventions implemented as appropriate)  Pt transferred from recovery via stretcher.  Vss.  Post op orders and assessment initiated.  Pt aao, tolerating po.  Family called to bs.  Pt in no acute distress and verbalizes no complaints. Call bell within reach.  Will continue to monitor.

## 2018-12-17 NOTE — ASSESSMENT & PLAN NOTE
-DCCV  -Anesthesia for sedation. Notified anesthesia that Dr. Agustin would like patient to have a bite block during the procedure.

## 2018-12-17 NOTE — HPI
"Mr. Guadalupe is a 53 year old male with atrial fibrillation and ASD. He denies any history of HTN, CHF, DM, stroke, TIA, MI, PVD, or atherosclerosis.  Primary cardiologist is Dr. Marie.  Presented to PCP with fatigue, noted to be in atrial fibrillation 10/18.  Has noted fatigue increasing in nature over the past year.  Also noted "something not feeling right in my chest."  Echo 9/20/18 normal biventricular structure and function mildly dilated RA and LA, sinus venosus ASD < 10 mm.  Placed on Xarelto.  DCCV 11/6/18.  Left hospital in NSR (ECG confirming NSR after).  Patient back in atrial fibrillation 11/7/18.  Referred for evaluation of sleep apnea. Was told he had "mild" sleep apnea, now on CPAP states he is compliant.  JOS completed 12/13/18 Conclusion:  · Normal left ventricular systolic function. The estimated ejection fraction is 63% on this limited non-Doppler echo.  · No wall motion abnormalities.  · Severe left atrial enlargement.  · Indeterminate left ventricular diastolic function.  · Low normal right ventricular systolic function.  · Mild right ventricular enlargement.  · Moderate right atrial enlargement.  · Normal central venous pressure (3 mm Hg).  · Trivial pericardial effusion.     NYHA score I  [id number] score 0  (Discussed with Dr. Agustin).  He underwent PVI RFA 12/14/18 with Dr. Agustin (see procedure note). Prior to hospital discharge, the patient was found to be in AF again and was started on propafenone 300 mg q8 hours and scheduled for outpatient DCCV. No JOS needed per Dr. Agustin as patient has been 100% compliant with his xarelto and has not missed a dose in the past 30 days. The patient also had a JOS done 12/13/18 with no evidence of BHUPENDRA thrombus.     He presents today to [facility] for scheduled for DCCV with Dr. Agustin. He denies any chest pain, palpitations, SOB, LUND, dizziness, light headedness, weakness, syncope, or near syncopal episodes. He denies any bleeding, infections, rashes, or " surgeries in the past 30 days. He is currently taking xarelto for stroke prevention (last dose 12/16/18).      I have personally reviewed the patient's EKG today, which shows AF at 95 bpm. QTc is 389.

## 2018-12-17 NOTE — TRANSFER OF CARE
Anesthesia Transfer of Care Note    Patient: Juan Luis Guadalupe    Procedure(s) Performed: Procedure(s) (LRB):  CARDIOVERSION (N/A)    Patient location: PACU    Anesthesia Type: general    Transport from OR: Transported from OR on room air with adequate spontaneous ventilation    Post pain: adequate analgesia    Post assessment: no apparent anesthetic complications and tolerated procedure well    Post vital signs: stable    Level of consciousness: awake, alert and oriented    Nausea/Vomiting: no nausea/vomiting    Complications: none    Transfer of care protocol was followed      Last vitals:   Visit Vitals  /79 (BP Location: Right arm, Patient Position: Lying)   Pulse 88   Temp 36.6 °C (97.8 °F) (Oral)   Resp 18   SpO2 96%

## 2018-12-17 NOTE — ANESTHESIA POSTPROCEDURE EVALUATION
Anesthesia Post Evaluation    Patient: Juan Luis Guadalupe    Procedure(s) Performed: Procedure(s) (LRB):  CARDIOVERSION (N/A)    Final Anesthesia Type: general  Patient location during evaluation: PACU  Patient participation: Yes- Able to Participate  Level of consciousness: awake and alert and oriented  Post-procedure vital signs: reviewed and stable  Pain management: adequate  Airway patency: patent  PONV status at discharge: No PONV  Anesthetic complications: no      Cardiovascular status: hemodynamically stable  Respiratory status: unassisted  Hydration status: euvolemic  Follow-up not needed.        Visit Vitals  /75 (BP Location: Right arm, Patient Position: Sitting)   Pulse 81   Temp 36.1 °C (97 °F)   Resp 18   SpO2 98%       Pain/Kimberly Score: No Data Recorded

## 2018-12-17 NOTE — H&P
"[facility]  Cardiac Electrophysiology  History and Physical     Admission Date: 12/17/2018  Code Status: Prior   Attending Provider: Erick Agustin MD   Principal Problem:Persistent atrial fibrillation    Subjective:     Chief Complaint:  Atrial fibrillation     HPI: Mr. Guadalupe is a 53 year old male with atrial fibrillation and ASD. He denies any history of HTN, CHF, DM, stroke, TIA, MI, PVD, or atherosclerosis.  Primary cardiologist is Dr. Marie.  Presented to PCP with fatigue, noted to be in atrial fibrillation 10/18.  Has noted fatigue increasing in nature over the past year.  Also noted "something not feeling right in my chest."  Echo 9/20/18 normal biventricular structure and function mildly dilated RA and LA, sinus venosus ASD < 10 mm.  Placed on Xarelto.  DCCV 11/6/18.  Left hospital in NSR (ECG confirming NSR after).  Patient back in atrial fibrillation 11/7/18.  Referred for evaluation of sleep apnea. Was told he had "mild" sleep apnea, now on CPAP states he is compliant.  JOS completed 12/13/18 Conclusion:  · Normal left ventricular systolic function. The estimated ejection fraction is 63% on this limited non-Doppler echo.  · No wall motion abnormalities.  · Severe left atrial enlargement.  · Indeterminate left ventricular diastolic function.  · Low normal right ventricular systolic function.  · Mild right ventricular enlargement.  · Moderate right atrial enlargement.  · Normal central venous pressure (3 mm Hg).  · Trivial pericardial effusion.     NYHA score I  [id number] score 0  (Discussed with Dr. Agustin).  He underwent PVI RFA 12/14/18 with Dr. Agustin (see procedure note). Prior to hospital discharge, the patient was found to be in AF again and was started on propafenone 300 mg q8 hours and scheduled for outpatient DCCV. No JOS needed per Dr. Agustin as patient has been 100% compliant with his xarelto and has not missed a dose in the past 30 days. The patient also had a JOS done " 12/13/18 with no evidence of BHUPENDRA thrombus.     He presents today to [facility] for scheduled for DCCV with Dr. Agustin. He denies any chest pain, palpitations, SOB, LUND, dizziness, light headedness, weakness, syncope, or near syncopal episodes. He denies any bleeding, infections, rashes, or surgeries in the past 30 days. He is currently taking xarelto for stroke prevention (last dose 12/16/18).      I have personally reviewed the patient's EKG today, which shows AF at 95 bpm. QTc is  389.     Past Medical History:   Diagnosis Date    Anticoagulant long-term use     ASD (atrial septal defect)     Atrial fibrillation      History reviewed. No pertinent surgical history.    Review of patient's allergies indicates:  No Known Allergies    No current facility-administered medications on file prior to encounter.      Current Outpatient Medications on File Prior to Encounter   Medication Sig    pantoprazole (PROTONIX) 40 MG tablet Take 1 tablet (40 mg total) by mouth once daily.    propafenone (RYTHMOL) 300 MG tablet Take 1 tablet (300 mg total) by mouth every 8 (eight) hours.    rivaroxaban (XARELTO) 20 mg Tab Take 20 mg by mouth.     Family History     None        Tobacco Use    Smoking status: Never Smoker    Smokeless tobacco: Never Used   Substance and Sexual Activity    Alcohol use: Yes     Alcohol/week: 1.2 oz     Types: 2 Glasses of wine per week     Comment: occassional    Drug use: No    Sexual activity: Not on file     Review of Systems   Constitution: Negative for chills, fever, weakness and malaise/fatigue.   HENT: Negative for congestion and nosebleeds.    Eyes: Negative for blurred vision.   Cardiovascular: Negative for chest pain, dyspnea on exertion, irregular heartbeat, leg swelling, near-syncope, orthopnea, palpitations, paroxysmal nocturnal dyspnea and syncope.   Respiratory: Negative for cough, hemoptysis, shortness of breath, sleep disturbances due to breathing, sputum production and wheezing.     Endocrine: Negative for polyphagia.   Hematologic/Lymphatic: Negative for bleeding problem. Does not bruise/bleed easily.   Skin: Negative for itching and rash.   Musculoskeletal: Negative for back pain, joint swelling, muscle cramps and muscle weakness.   Gastrointestinal: Negative for bloating, abdominal pain, hematemesis, hematochezia, nausea and vomiting.   Genitourinary: Negative for dysuria and hematuria.   Neurological: Negative for dizziness, focal weakness, headaches, light-headedness, loss of balance and numbness.   Psychiatric/Behavioral: Negative for altered mental status.     Objective:     Vital Signs (Most Recent):  Temp: 97.8 °F (36.6 °C) (12/17/18 1100)  Pulse: 88 (12/17/18 1100)  Resp: 18 (12/17/18 1100)  BP: 111/79 (12/17/18 1101)  SpO2: 96 % (12/17/18 1100) Vital Signs (24h Range):  Temp:  [97.8 °F (36.6 °C)] 97.8 °F (36.6 °C)  Pulse:  [88] 88  Resp:  [18] 18  SpO2:  [96 %] 96 %  BP: (111-123)/(79-84) 111/79     There is no height or weight on file to calculate BMI.    SpO2: 96 %  O2 Device (Oxygen Therapy): room air    Physical Exam   Constitutional: He is oriented to person, place, and time. He appears well-developed and well-nourished. No distress.   HENT:   Head: Normocephalic and atraumatic.   Mouth/Throat: No oropharyngeal exudate.       Eyes: Conjunctivae are normal. Pupils are equal, round, and reactive to light. Right eye exhibits no discharge. Left eye exhibits no discharge.   Neck: Normal range of motion. Neck supple.   Cardiovascular: Normal rate, S1 normal, S2 normal, normal heart sounds and intact distal pulses. An irregularly irregular rhythm present.  No extrasystoles are present. PMI is not displaced. Exam reveals no gallop and no friction rub.   No murmur heard.  Pulses:       Radial pulses are 2+ on the right side, and 2+ on the left side.        Dorsalis pedis pulses are 1+ on the right side, and 1+ on the left side.   Pulmonary/Chest: Effort normal and breath sounds normal.  No accessory muscle usage. No respiratory distress. He has no decreased breath sounds. He has no wheezes. He has no rhonchi. He has no rales. He exhibits no tenderness.   Abdominal: Soft. Bowel sounds are normal. He exhibits no distension and no mass. There is no tenderness. There is no rebound and no guarding.   Musculoskeletal: Normal range of motion. He exhibits no edema, tenderness or deformity.        Right shoulder: He exhibits no swelling.   Neurological: He is alert and oriented to person, place, and time. He has normal strength. He is not disoriented. No cranial nerve deficit or sensory deficit. He exhibits normal muscle tone. Coordination and gait normal.   Skin: Skin is warm and dry. No rash noted. He is not diaphoretic. No erythema.   Psychiatric: He has a normal mood and affect. His behavior is normal. Judgment and thought content normal.   Nursing note and vitals reviewed.    Significant Labs: Labs from 12/13/18 through today reviewed.   Significant Imaging: EKG: AF 95 bpm.    Assessment and Plan:     * Persistent atrial fibrillation    -DCCV. No JOS needed per Dr. Agustin as patient has been 100% compliant with his xarelto and has not missed a dose in the past 30 days. The patient also had a JOS done 12/13/18 with no evidence of BHUPENDRA thrombus.   -Anesthesia for sedation. Notified anesthesia that Dr. Agustin would like patient to have a bite block during the procedure.       Prior to procedure, extensive discussion with patient regarding risks and benefits of DCCV, and patient would like to proceed. Dr. Agustin at bedside answering all questions. Patient missed his morning dose of Rythmol, but has his home medication on hand. Dr. Agustin instructed patient to take his own Rythmol and witnessed the patient take the dose with a small sip of water (at 10:15-10:30 AM). Anesthesia notified of time patient took medication and of bite block).   The patient and wife voice understanding and all questions have been  answered. No further questions/concerns voiced at this time.     Carolyn Nelson NP  Cardiac Electrophysiology  [facility]    Attending: Erick Agustin MD

## 2018-12-17 NOTE — ANESTHESIA PREPROCEDURE EVALUATION
12/17/2018  Pre-operative evaluation for Procedure(s) (LRB):  CARDIOVERSION (N/A)    Juan Luis Guadalupe is a 53 y.o. male     Patient Active Problem List   Diagnosis    Persistent atrial fibrillation    ASD (atrial septal defect)    Tongue lesion       Review of patient's allergies indicates:  No Known Allergies    No current facility-administered medications on file prior to encounter.      Current Outpatient Medications on File Prior to Encounter   Medication Sig Dispense Refill    pantoprazole (PROTONIX) 40 MG tablet Take 1 tablet (40 mg total) by mouth once daily. 30 tablet 0    propafenone (RYTHMOL) 300 MG tablet Take 1 tablet (300 mg total) by mouth every 8 (eight) hours. 90 tablet 11    rivaroxaban (XARELTO) 20 mg Tab Take 20 mg by mouth.         History reviewed. No pertinent surgical history.    Social History     Socioeconomic History    Marital status: Single     Spouse name: Not on file    Number of children: Not on file    Years of education: Not on file    Highest education level: Not on file   Social Needs    Financial resource strain: Not on file    Food insecurity - worry: Not on file    Food insecurity - inability: Not on file    Transportation needs - medical: Not on file    Transportation needs - non-medical: Not on file   Occupational History    Not on file   Tobacco Use    Smoking status: Never Smoker    Smokeless tobacco: Never Used   Substance and Sexual Activity    Alcohol use: Yes     Alcohol/week: 1.2 oz     Types: 2 Glasses of wine per week     Comment: occassional    Drug use: No    Sexual activity: Not on file   Other Topics Concern    Not on file   Social History Narrative    Not on file         CBC: No results for input(s): WBC, RBC, HGB, HCT, PLT, MCV, MCH, MCHC in the last 72 hours.    CMP:   Recent Labs     12/17/18  1030      K 4.3      CO2  25   BUN 17   CREATININE 1.3   GLU 92   CALCIUM 9.1       INR  No results for input(s): PT, INR, PROTIME, APTT in the last 72 hours.        Diagnostic Studies:      EKD Echo:  No results found for this or any previous visit.      Anesthesia Evaluation    I have reviewed the Patient Summary Reports.     I have reviewed the Medications.     Review of Systems  Anesthesia Hx:  History of prior surgery of interest to airway management or planning: Denies Family Hx of Anesthesia complications.   Denies Personal Hx of Anesthesia complications.       Physical Exam  General:  Obesity    Airway/Jaw/Neck:  Airway Findings: Mouth Opening: Normal Tongue: Normal  General Airway Assessment: Adult  Mallampati: II  TM Distance: Normal, at least 6 cm  Jaw/Neck Findings:  Neck ROM: Normal ROM      Dental:  Dental Findings: In tact   Chest/Lungs:  Chest/Lungs Findings: Clear to auscultation, Normal Respiratory Rate         Mental Status:  Mental Status Findings:  Cooperative, Alert and Oriented         Anesthesia Plan  Type of Anesthesia, risks & benefits discussed:  Anesthesia Type:  general  Patient's Preference:   Intra-op Monitoring Plan: standard ASA monitors  Intra-op Monitoring Plan Comments:   Post Op Pain Control Plan: multimodal analgesia  Post Op Pain Control Plan Comments:   Induction:   IV  Beta Blocker:  Patient is not currently on a Beta-Blocker (No further documentation required).       Informed Consent: Patient understands risks and agrees with Anesthesia plan.  Questions answered. Anesthesia consent signed with patient.  ASA Score: 3     Day of Surgery Review of History & Physical:    H&P update referred to the surgeon.         Ready For Surgery From Anesthesia Perspective.

## 2018-12-17 NOTE — PROGRESS NOTES
Patient discharged per MD orders. Instructions given on medications, wound care, activity, signs of infection, when to call MD, and follow up appointments. Pt verbalized understanding.  Patient AAOx3, VSS, no c/o pain or discomfort at this time. Telemetry and PIV removed. Patient refused transport, ambulated off unit with family.

## 2018-12-18 ENCOUNTER — TELEPHONE (OUTPATIENT)
Dept: ELECTROPHYSIOLOGY | Facility: CLINIC | Age: 53
End: 2018-12-18

## 2018-12-18 NOTE — TELEPHONE ENCOUNTER
Called pt re f/u apts post dccv 12/17/18.  Per pt, adv he will have his 1wk ekg at his cardiologist in [place] & will have them fax copy of ekg to us.  Pt adv he let ujde know.  Juancarlos pt 6wk f/u apt w/ JF 1/30/19 w/ EKG prior.

## 2018-12-18 NOTE — DISCHARGE SUMMARY
"[facility]  Cardiac Electrophysiology  Discharge Summary      Patient Name: Juan Luis Guadalupe  MRN: [id number]  Admission Date: 12/17/2018  Hospital Length of Stay: 0 days  Discharge Date and Time: 12/17/2018  2:42 PM  Attending Physician: No att. providers found    Discharging Provider: Carolyn Nelson NP  Primary Care Physician: Primary Doctor Ashleigh    HPI:   Mr. Guadalupe is a 53 year old male with atrial fibrillation and ASD. He denies any history of HTN, CHF, DM, stroke, TIA, MI, PVD, or atherosclerosis.  Primary cardiologist is Dr. Marie.  Presented to PCP with fatigue, noted to be in atrial fibrillation 10/18.  Has noted fatigue increasing in nature over the past year.  Also noted "something not feeling right in my chest."  Echo 9/20/18 normal biventricular structure and function mildly dilated RA and LA, sinus venosus ASD < 10 mm.  Placed on Xarelto.  DCCV 11/6/18.  Left hospital in NSR (ECG confirming NSR after).  Patient back in atrial fibrillation 11/7/18.  Referred for evaluation of sleep apnea. Was told he had "mild" sleep apnea, now on CPAP states he is compliant.  JOS completed 12/13/18 Conclusion:  · Normal left ventricular systolic function. The estimated ejection fraction is 63% on this limited non-Doppler echo.  · No wall motion abnormalities.  · Severe left atrial enlargement.  · Indeterminate left ventricular diastolic function.  · Low normal right ventricular systolic function.  · Mild right ventricular enlargement.  · Moderate right atrial enlargement.  · Normal central venous pressure (3 mm Hg).  · Trivial pericardial effusion.     NYHA score I  [id number] score 0  (Discussed with Dr. Agustin).  He underwent PVI RFA 12/14/18 with Dr. Agustin (see procedure note). Prior to hospital discharge, the patient was found to be in AF again and was started on propafenone 300 mg q8 hours and scheduled for outpatient DCCV. No JOS needed per Dr. Agustin as patient has been 100% compliant " with his xarelto and has not missed a dose in the past 30 days. The patient also had a JOS done 12/13/18 with no evidence of BHUPENDRA thrombus.     He presents today to [facility] for scheduled for DCCV with Dr. Agustin. He denies any chest pain, palpitations, SOB, LUND, dizziness, light headedness, weakness, syncope, or near syncopal episodes. He denies any bleeding, infections, rashes, or surgeries in the past 30 days. He is currently taking xarelto for stroke prevention (last dose 12/16/18).      I have personally reviewed the patient's EKG today, which shows AF at 95 bpm. QTc is  389.     Procedure(s) (LRB):  CARDIOVERSION (N/A)     Indwelling Lines/Drains at time of discharge:  Lines/Drains/Airways          None        Hospital Course: Patient presented in atrial fibrillation. Patient on xarelto. No JOS needed per Dr. Agustin as patient has been 100% compliant with his xarelto and has not missed a dose in the past 30 days. The patient also had a JOS done 12/13/18 with no evidence of BHUPENDRA thrombus. DCCV with restoration of normal sinus rhythm (see procedure report). Patient tolerated procedure well with no acute complications. Post procedure EKG showed normal sinus rhythm with a ventricular rate of 73 bpm. Plan to continue home medications including xarelto, propafenone, and pantoprazole.  Follow up EKG in 1 week and follow up with Dr. Agustin in 4 weeks.  If patient has recurrence, consider amiodarone and then repeating DCCV one month later. Discharge plans/instructions discussed with patient and wife who verbalized understanding and agreement of plans of care. No further questions or concerns voiced at this time. Discharged home in stable condition.     Consults:   Anesthesia.    Significant Diagnostic Studies: Labs:   BMP:   Recent Labs   Lab 12/17/18  1030   GLU 92      K 4.3      CO2 25   BUN 17   CREATININE 1.3   CALCIUM 9.1   , CMP   Recent Labs   Lab 12/17/18  1030      K 4.3      CO2 25   GLU 92    BUN 17   CREATININE 1.3   CALCIUM 9.1   ANIONGAP 8   ESTGFRAFRICA >60.0   EGFRNONAA >60.0   , CBC No results for input(s): WBC, HGB, HCT, PLT in the last 48 hours. and INR   Lab Results   Component Value Date    INR 1.1 12/13/2018     Final Active Diagnoses:    Diagnosis Date Noted POA    PRINCIPAL PROBLEM:  Persistent atrial fibrillation [I48.1] 11/07/2018 Yes      Problems Resolved During this Admission:     No new Assessment & Plan notes have been filed under this hospital service since the last note was generated.  Service: Arrhythmia    Discharged Condition: stable    Disposition: Home or Self Care    Follow Up:  Follow-up Information     EKG 1 [facility] In 1 week.    Specialty:  Cardiology  Why:  s/p DCCV           Erick Agustin MD In 1 month.    Specialties:  Electrophysiology, Cardiology  Why:  s/p DCCV  Contact information:  [id number]  [facility] [address]  [phone]                 Patient Instructions:      Diet Cardiac     No driving until:   Order Comments: No driving or operating heavy machinery for 24-48 hours after your procedure, you received sedation.     Other restrictions (specify):   Order Comments: Medications:  -Continue your home medications as listed on your medication list after you are discharged.  -If you have problems or side effects caused by your medications, call your Physician.    New Medications:  None.    Diet  -You may resume oral intake after you are discharged, as long you have no swallowing difficulties.    Side effects:  -You may be drowsy for the remainder of the day because you received sedation.    Because you have received sedation for this procedure:  -Limit activity for the remainder of the day.  -Do not drive or operate any equipment for the remainder of the day.  -Do not smoke for at least 6 hours and until you are fully awake and alert.  -Do not drink alcoholic beverage for 24 hours.  -Defer important decision making until the following day.    Go to the  Emergency Department if you develop:  -Bleeding  -Weakness or numbness  -Visual, gait or speech disturbance  -New chest pain, palpitations, shortness of breath, rapid heart beat, or fainting  -Fever    Follow up:  -EKG in 1 week.   -Dr. Agustin in 1 month.     Notify your health care provider if you experience any of the following:  temperature >100.4     Notify your health care provider if you experience any of the following:  persistent nausea and vomiting or diarrhea     Notify your health care provider if you experience any of the following:  severe uncontrolled pain     Notify your health care provider if you experience any of the following:  redness, tenderness, or signs of infection (pain, swelling, redness, odor or green/yellow discharge around incision site)     Notify your health care provider if you experience any of the following:  difficulty breathing or increased cough     Notify your health care provider if you experience any of the following:  severe persistent headache     Notify your health care provider if you experience any of the following:  worsening rash     Notify your health care provider if you experience any of the following:  persistent dizziness, light-headedness, or visual disturbances     Notify your health care provider if you experience any of the following:  increased confusion or weakness     Notify your health care provider if you experience any of the following:     Medications:  Reconciled Home Medications:      Medication List      CONTINUE taking these medications    pantoprazole 40 MG tablet  Commonly known as:  PROTONIX  Take 1 tablet (40 mg total) by mouth once daily.     propafenone 300 MG tablet  Commonly known as:  RYTHMOL  Take 1 tablet (300 mg total) by mouth every 8 (eight) hours.     rivaroxaban 20 mg Tab  Commonly known as:  XARELTO  Take 20 mg by mouth.          Plan:   -Continue all home medications.  -Follow up EKG in 1 week. If has recurrence, consider amiodarone  and then repeating DCCV one month later.  -Follow up with Dr. Agustin in 1 month.    Time spent on the discharge of patient: 15 minutes    Carolyn Nelson NP  Cardiac Electrophysiology  [facility]    Attending:Erick Agustin MD

## 2018-12-21 ENCOUNTER — RESEARCH ENCOUNTER (OUTPATIENT)
Dept: RESEARCH | Facility: HOSPITAL | Age: 53
End: 2018-12-21

## 2018-12-21 NOTE — PROGRESS NOTES
"Date: 12/21/2018     Sponsor: [employer]     Study Title/IRB Number: [id number]     Principle Investigator: Erick Agustin MD     7 day phone call completed per protocol with patient.    Patient stated he had a cardioversion on 12/17, since this he has felt really well and his heart rate has been "normal".  No protocol related adverse events reported and medication reviewed.   Pt is scheduled to see his cardiologist in [place] will fax EKG to clinic.  All questions answered to patients satisfaction; next research visit will be conducted in clinic in approximately 3 months.  "

## 2019-01-29 NOTE — PROGRESS NOTES
"Mr. Guadalupe is a patient of Dr. Agustin and was last seen in clinic 11/7/2018.      Subjective:   Patient ID:  Juan Luis Guadalupe is a 53 y.o. male who presents for follow-up of Atrial Fibrillation  .     HPI:    Mr. Guadalupe is a 53 y.o. male with AF, ASD here for follow up after PVI and DCCV.    Background:    HPI 54 yo male with atrial fibrillation, ASD.  Primary cardiologist is Dr. Marie.  Presented to PCP with fatigue, noted to be in atrial fibrillation 10/18.  Has noted fatigue increasing in nature over the past year.  Also noted "something not feeling right in my chest."  Echo 9/20/18 normal biventricular structure and function mildly dilated RA and LA, sinus venosus ASD < 10 mm.  Placed on Xarelto.  DCCV yesterday.  Left hospital in nsr (I have an ECG confirming nsr after).  Is back in atrial fibrillation today.  Referred for evaluation of sleep apnea. Was told he had "mild" sleep apnea.    Update (01/30/2019):    Underwent PVI 12/13/2018 and started on propafenone.    On 12/17/2018 he was back in AF and underwent successful cardioversion. Plan to continue propafenone. If patient has recurrence, consider amiodarone and then repeating DCCV one month later.    Today he says he has been checking his pulse daily and has been in rhythm ever since his cardioversion. Mr. Guadalupe denies chest pain with exertion or at rest, palpitations, SOB, LUND, dizziness, or syncope. He is compliant with his CPAP and avoiding drinking. He does say that he feels some muscle discomfort after taking his propafenone, but this is relatively mild.    He is currently taking xarelto 20mg daily for stroke prophylaxis and denies significant bleeding episodes. He is currently being treated with propafenone 300mg BID for rhythm control. Kidney function is stable, with a creatinine of 1.2 on 12/17/2018.    I have personally reviewed the patient's EKG today, which shows 71bpm. MN interval is 182. QTc is 428.    Recent Cardiac Tests:    2D Echo " "(12/13/2018):  · Normal left ventricular systolic function. The estimated ejection fraction is 63% on this limited non-Doppler echo.  · No wall motion abnormalities.  · Severe left atrial enlargement.  · Indeterminate left ventricular diastolic function.  · Low normal right ventricular systolic function.  · Mild right ventricular enlargement.  · Moderate right atrial enlargement.  · Normal central venous pressure (3 mm Hg).  · Trivial pericardial effusion.    Current Outpatient Medications   Medication Sig    propafenone (RYTHMOL) 300 MG tablet Take 1 tablet (300 mg total) by mouth every 8 (eight) hours.    rivaroxaban (XARELTO) 20 mg Tab Take 20 mg by mouth.     No current facility-administered medications for this visit.        Review of Systems   Constitution: Negative for malaise/fatigue.   Cardiovascular: Negative for chest pain, dyspnea on exertion, irregular heartbeat, leg swelling and palpitations.   Respiratory: Negative for shortness of breath.    Hematologic/Lymphatic: Negative for bleeding problem.   Skin: Negative for rash.   Musculoskeletal: Positive for myalgias.   Gastrointestinal: Negative for hematemesis, hematochezia and nausea.   Genitourinary: Negative for hematuria.   Neurological: Negative for light-headedness.   Psychiatric/Behavioral: Negative for altered mental status.   Allergic/Immunologic: Negative for persistent infections.     Objective:        /82   Pulse 71   Ht 6' 1" (1.854 m)   Wt 115.8 kg (255 lb 4.7 oz)   BMI 33.68 kg/m²     Physical Exam   Constitutional: He is oriented to person, place, and time. He appears well-developed and well-nourished.   HENT:   Head: Normocephalic.   Nose: Nose normal.   Eyes: Pupils are equal, round, and reactive to light.   Cardiovascular: Normal rate, regular rhythm, S1 normal and S2 normal.   No murmur heard.  Pulses:       Radial pulses are 2+ on the right side, and 2+ on the left side.   Pulmonary/Chest: Breath sounds normal. No " respiratory distress.   Abdominal: Normal appearance.   Musculoskeletal: Normal range of motion. He exhibits no edema.   Neurological: He is alert and oriented to person, place, and time.   Skin: Skin is warm and dry. No erythema.   Psychiatric: He has a normal mood and affect. His speech is normal and behavior is normal.   Nursing note and vitals reviewed.    Lab Results   Component Value Date     12/17/2018    K 4.3 12/17/2018    BUN 17 12/17/2018    CREATININE 1.3 12/17/2018    HGB 15.8 12/13/2018    HCT 46.2 12/13/2018       Recent Labs   Lab 12/13/18  1127   INR 1.1       Assessment:     1. Persistent atrial fibrillation    2. Chronic anticoagulation      Plan:     In summary, Mr. Guadalupe is a 53 y.o. male with AF, ASD here for follow up after PVI and DCCV.  He is doing well 6 weeks post-PVI and DCCV, with no known reoccurrence of AF.  He is having some muscle aches with his propafenone, and is eager to discontinue or reduce the dose of this med. Will consult with Dr. Agustin re: timeline.  We also discussed his xarelto, as he would like to stop this med eventually. Juan Luis Guadalupe's [id number] Score is 0. I advised him to continue his xarelto for now, given that he will likely be stopping his AAD in the near future, but it would be reasonable to consider changing to ASA eventually when he is stable.    Continue current medications. Will consult with Dr. Agustin re: rhythmol.  RTC in 2 months (with Holter) sooner if needed.    *A copy of this note has been sent to Dr. Agustin*    Follow-up in about 2 months (around 3/30/2019).    ------------------------------------------------------------------    ATUL Arevalo, NP-C  [facility]

## 2019-01-30 ENCOUNTER — OFFICE VISIT (OUTPATIENT)
Dept: ELECTROPHYSIOLOGY | Facility: CLINIC | Age: 54
End: 2019-01-30
Payer: COMMERCIAL

## 2019-01-30 ENCOUNTER — HOSPITAL ENCOUNTER (OUTPATIENT)
Dept: CARDIOLOGY | Facility: CLINIC | Age: 54
Discharge: HOME | End: 2019-01-30
Payer: COMMERCIAL

## 2019-01-30 VITALS
DIASTOLIC BLOOD PRESSURE: 82 MMHG | HEART RATE: 71 BPM | BODY MASS INDEX: 33.84 KG/M2 | WEIGHT: 255.31 LBS | SYSTOLIC BLOOD PRESSURE: 112 MMHG | HEIGHT: 73 IN

## 2019-01-30 DIAGNOSIS — Z79.01 CHRONIC ANTICOAGULATION: ICD-10-CM

## 2019-01-30 DIAGNOSIS — I49.8 OTHER SPECIFIED CARDIAC ARRHYTHMIAS: ICD-10-CM

## 2019-01-30 DIAGNOSIS — I48.19 PERSISTENT ATRIAL FIBRILLATION: Primary | ICD-10-CM

## 2019-01-30 PROCEDURE — 99999 PR PBB SHADOW E&M-EST. PATIENT-LVL III: ICD-10-PCS | Mod: PBBFAC,,, | Performed by: NURSE PRACTITIONER

## 2019-01-30 PROCEDURE — 3008F PR BODY MASS INDEX (BMI) DOCUMENTED: ICD-10-PCS | Mod: S$GLB,,, | Performed by: NURSE PRACTITIONER

## 2019-01-30 PROCEDURE — 99214 PR OFFICE/OUTPT VISIT, EST, LEVL IV, 30-39 MIN: ICD-10-PCS | Mod: S$GLB,,, | Performed by: NURSE PRACTITIONER

## 2019-01-30 PROCEDURE — 99214 OFFICE O/P EST MOD 30 MIN: CPT | Mod: S$GLB,,, | Performed by: NURSE PRACTITIONER

## 2019-01-30 PROCEDURE — 93005 RHYTHM STRIP: ICD-10-PCS | Mod: S$GLB,,, | Performed by: INTERNAL MEDICINE

## 2019-01-30 PROCEDURE — 3008F BODY MASS INDEX DOCD: CPT | Mod: S$GLB,,, | Performed by: NURSE PRACTITIONER

## 2019-01-30 PROCEDURE — 93000 ELECTROCARDIOGRAM COMPLETE: CPT | Mod: S$GLB,,, | Performed by: INTERNAL MEDICINE

## 2019-01-30 PROCEDURE — 93005 ELECTROCARDIOGRAM TRACING: CPT | Mod: S$GLB,,, | Performed by: INTERNAL MEDICINE

## 2019-01-30 PROCEDURE — 93000 RHYTHM STRIP: ICD-10-PCS | Mod: S$GLB,,, | Performed by: INTERNAL MEDICINE

## 2019-01-30 PROCEDURE — 99999 PR PBB SHADOW E&M-EST. PATIENT-LVL III: CPT | Mod: PBBFAC,,, | Performed by: NURSE PRACTITIONER

## 2019-01-30 NOTE — Clinical Note
He is having some mild muscle aches with his propafenone, and was wondering what your timeline was for stopping it. He did revert back to AF within days of his PVI 6 weeks ago but believes he has been in regular rhythm since. I told him I would defer to you. He is fine with whatever you recommend.Thanks,CHUCHO

## 2019-02-01 ENCOUNTER — TELEPHONE (OUTPATIENT)
Dept: ELECTROPHYSIOLOGY | Facility: CLINIC | Age: 54
End: 2019-02-01

## 2019-02-01 NOTE — TELEPHONE ENCOUNTER
Called patient and confirmed that he should stay on rhythmol for now - offered to switch him to flecainide but he said side effects were mild and he was fine with rhythmol.

## 2019-03-25 ENCOUNTER — TELEPHONE (OUTPATIENT)
Dept: ELECTROPHYSIOLOGY | Facility: CLINIC | Age: 54
End: 2019-03-25

## 2019-03-26 ENCOUNTER — TELEPHONE (OUTPATIENT)
Dept: ELECTROPHYSIOLOGY | Facility: CLINIC | Age: 54
End: 2019-03-26

## 2019-03-27 ENCOUNTER — RESEARCH ENCOUNTER (OUTPATIENT)
Dept: RESEARCH | Facility: HOSPITAL | Age: 54
End: 2019-03-27

## 2019-03-27 NOTE — PROGRESS NOTES
Date: 3/27/2019     Sponsor: [employer]     Study Title/IRB Number: [id number]     Principle Investigator: Erick Agustin MD    Study Visit: 3 month    Patient originally scheduled to come into clinic for visit today (3/27/2019) with Dr. Agustin and research.    Pt was to have a holter monitor done prior to visit with Dr. Agustin but was unable to complete.    Mr. Guadalupe lives out of town and was unable to come to this visit as he'll need to be rescheduled with Nikole after his holter in a few weeks.       AFEQT and EQ-5D completed over the phone per protocol. EKG is being ordered by Dr. Agustin at the patients cardiologist in [place].  CRC emailed patient the [facility] enrollment and contact form which he signed for [facility] to contact him and ship the holter/event monitor directly.    All questions answered to patients satisfaction, he expressed understanding about [facility] contacting him and will get EKG completed as soon as the orders are at his Cardiologist office.

## 2019-03-28 ENCOUNTER — TELEPHONE (OUTPATIENT)
Dept: ELECTROPHYSIOLOGY | Facility: CLINIC | Age: 54
End: 2019-03-28

## 2019-03-28 DIAGNOSIS — I48.91 ATRIAL FIBRILLATION, UNSPECIFIED TYPE: Primary | ICD-10-CM

## 2019-04-02 ENCOUNTER — RESEARCH ENCOUNTER (OUTPATIENT)
Dept: RESEARCH | Facility: HOSPITAL | Age: 54
End: 2019-04-02

## 2019-04-03 ENCOUNTER — TELEPHONE (OUTPATIENT)
Dept: ELECTROPHYSIOLOGY | Facility: CLINIC | Age: 54
End: 2019-04-03

## 2019-04-03 DIAGNOSIS — I48.19 PERSISTENT ATRIAL FIBRILLATION: Primary | ICD-10-CM

## 2019-04-03 NOTE — TELEPHONE ENCOUNTER
Spoke with patient. He wanted to know if he should stay on the propafenone until he sees Dr Agustin on 6/12/19. Advised that he should stay on the propafenone. Also advised that Dr Agustin wants a 24 hour holter prior to appt. He is requesting that we fax order to Dr Marie, his cardiologist in [place], [place]. Order faxed.

## 2019-04-03 NOTE — TELEPHONE ENCOUNTER
----- Message from Galina Clayton sent at 4/2/2019  4:35 PM CDT -----  Contact: Pt called   Pt calling about appt scheduled on 6/12/19 with Dr. Agustin and he has a few other questions. Please call pt @ [phone]. Thank you.

## 2019-04-18 NOTE — PROGRESS NOTES
4/2/2019     Sponsor: [employer]     Study Title/IRB Number: [id number]     Principle Investigator: Erick Agustin MD     Visit: Post 3-month holter phone call     Contacted patient in regards to his 3-month holter, he reported no symptoms during his holter monitoring.  Patient returned to holter [facility].

## 2019-05-31 ENCOUNTER — TELEPHONE (OUTPATIENT)
Dept: ELECTROPHYSIOLOGY | Facility: CLINIC | Age: 54
End: 2019-05-31

## 2019-05-31 NOTE — TELEPHONE ENCOUNTER
Called patient to schedule holter. Per pam Matthews notes in 01/19 pt needed a holter prior to follow up appointment. Left message with patient to return our call.

## 2019-06-06 ENCOUNTER — TELEPHONE (OUTPATIENT)
Dept: ELECTROPHYSIOLOGY | Facility: CLINIC | Age: 54
End: 2019-06-06

## 2019-06-06 NOTE — TELEPHONE ENCOUNTER
Spoke to patient he is awaiting a call from    in [facility] to schedule Holter that will then be faxed over to us for his appointment.

## 2019-06-07 ENCOUNTER — HOSPITAL ENCOUNTER (OUTPATIENT)
Dept: CARDIOLOGY | Facility: HOSPITAL | Age: 54
Discharge: HOME | End: 2019-06-07
Attending: INTERNAL MEDICINE
Payer: COMMERCIAL

## 2019-06-07 DIAGNOSIS — I48.19 PERSISTENT ATRIAL FIBRILLATION: ICD-10-CM

## 2019-06-07 PROCEDURE — 93225 XTRNL ECG REC<48 HRS REC: CPT

## 2019-06-10 ENCOUNTER — TELEPHONE (OUTPATIENT)
Dept: ELECTROPHYSIOLOGY | Facility: CLINIC | Age: 54
End: 2019-06-10

## 2019-06-10 NOTE — PROGRESS NOTES
"Subjective:    Patient ID:  Juan Luis Guadalupe is a 53 y.o. male who presents for follow-up of persistent atrial fibrillation      HPI 54 yo male with AF, ASD, sleep apnea (on CPAP)     Background:     HPI 54 yo male with atrial fibrillation, ASD.  Primary cardiologist is Dr. Marie.  Presented to PCP with fatigue, noted to be in atrial fibrillation 10/18.  Has noted fatigue increasing in nature over the past year.  Also noted "something not feeling right in my chest."  Echo 9/20/18 normal biventricular structure and function mildly dilated RA and LA, sinus venosus ASD < 10 mm.  Placed on Xarelto.  DCCV >> was back in AF the next day,  Referred for evaluation of sleep apnea. Was told he had "mild" sleep apnea.     Underwent PVI 12/13/2018 and started on propafenone.     On 12/17/2018 he was back in AF and underwent successful cardioversion.     Update:  Feels better overall since PVI + initiation of CPAP.  Feels poorly on the Propafenone.  Notes itching of his eyes, blurry vision, muscle aches.  Holter 6/10/19 nsr without significant arrhythmias.  22 PAC's.    ECG reveals nsr with normal baseline intervals.    Review of Systems   Constitution: Negative. Negative for malaise/fatigue.   Eyes: Positive for blurred vision.   Cardiovascular: Negative for chest pain, dyspnea on exertion, irregular heartbeat, leg swelling, near-syncope, orthopnea, palpitations, paroxysmal nocturnal dyspnea and syncope.   Respiratory: Negative for cough and shortness of breath.    Neurological: Negative for dizziness, light-headedness and weakness.   All other systems reviewed and are negative.       Objective:    Physical Exam   Constitutional: He is oriented to person, place, and time. He appears well-developed and well-nourished.   Eyes: Conjunctivae are normal. No scleral icterus.   Neck: No JVD present. No tracheal deviation present.   Cardiovascular: Normal rate, regular rhythm and normal heart sounds. PMI is not displaced. "   Pulmonary/Chest: Effort normal and breath sounds normal. No respiratory distress.   Abdominal: Soft. There is no hepatosplenomegaly. There is no tenderness.   Musculoskeletal: He exhibits no edema (lower extremity) or tenderness.   Neurological: He is alert and oriented to person, place, and time.   Skin: Skin is warm and dry. No rash noted.   Psychiatric: He has a normal mood and affect. His behavior is normal.         Assessment:       1. Persistent atrial fibrillation    2. ASD (atrial septal defect)    3. Obstructive sleep apnea syndrome         Plan:           Doing great.  Discontinue Propafenone.  Discontinue Xarelto.  Counseled on weight reduction.  F/u in 3 months.  If remains arrhythmia free in 3 months >> refer back to Interventional for consideration of PFO closure.

## 2019-06-10 NOTE — TELEPHONE ENCOUNTER
Called pt to see if he got his holter done, if not we needed to reschedule , left message for patient to return our call.

## 2019-06-10 NOTE — H&P (VIEW-ONLY)
"Subjective:    Patient ID:  Juan Luis Guadalupe is a 53 y.o. male who presents for follow-up of persistent atrial fibrillation      HPI 52 yo male with AF, ASD, sleep apnea (on CPAP)     Background:     HPI 52 yo male with atrial fibrillation, ASD.  Primary cardiologist is Dr. Marie.  Presented to PCP with fatigue, noted to be in atrial fibrillation 10/18.  Has noted fatigue increasing in nature over the past year.  Also noted "something not feeling right in my chest."  Echo 9/20/18 normal biventricular structure and function mildly dilated RA and LA, sinus venosus ASD < 10 mm.  Placed on Xarelto.  DCCV >> was back in AF the next day,  Referred for evaluation of sleep apnea. Was told he had "mild" sleep apnea.     Underwent PVI 12/13/2018 and started on propafenone.     On 12/17/2018 he was back in AF and underwent successful cardioversion.     Update:  Feels better overall since PVI + initiation of CPAP.  Feels poorly on the Propafenone.  Notes itching of his eyes, blurry vision, muscle aches.  Holter 6/10/19 nsr without significant arrhythmias.  22 PAC's.    ECG reveals nsr with normal baseline intervals.    Review of Systems   Constitution: Negative. Negative for malaise/fatigue.   Eyes: Positive for blurred vision.   Cardiovascular: Negative for chest pain, dyspnea on exertion, irregular heartbeat, leg swelling, near-syncope, orthopnea, palpitations, paroxysmal nocturnal dyspnea and syncope.   Respiratory: Negative for cough and shortness of breath.    Neurological: Negative for dizziness, light-headedness and weakness.   All other systems reviewed and are negative.       Objective:    Physical Exam   Constitutional: He is oriented to person, place, and time. He appears well-developed and well-nourished.   Eyes: Conjunctivae are normal. No scleral icterus.   Neck: No JVD present. No tracheal deviation present.   Cardiovascular: Normal rate, regular rhythm and normal heart sounds. PMI is not displaced. "   Pulmonary/Chest: Effort normal and breath sounds normal. No respiratory distress.   Abdominal: Soft. There is no hepatosplenomegaly. There is no tenderness.   Musculoskeletal: He exhibits no edema (lower extremity) or tenderness.   Neurological: He is alert and oriented to person, place, and time.   Skin: Skin is warm and dry. No rash noted.   Psychiatric: He has a normal mood and affect. His behavior is normal.         Assessment:       1. Persistent atrial fibrillation    2. ASD (atrial septal defect)    3. Obstructive sleep apnea syndrome         Plan:           Doing great.  Discontinue Propafenone.  Discontinue Xarelto.  Counseled on weight reduction.  F/u in 3 months.  If remains arrhythmia free in 3 months >> refer back to Interventional for consideration of PFO closure.

## 2019-06-11 LAB
OHS CV EVENT MONITOR DAY: 1
OHS CV HOLTER LENGTH DECIMAL HOURS: 47.98
OHS CV HOLTER LENGTH HOURS: 23
OHS CV HOLTER LENGTH MINUTES: 59

## 2019-06-12 ENCOUNTER — HOSPITAL ENCOUNTER (OUTPATIENT)
Dept: CARDIOLOGY | Facility: CLINIC | Age: 54
Discharge: HOME | End: 2019-06-12
Payer: COMMERCIAL

## 2019-06-12 ENCOUNTER — OFFICE VISIT (OUTPATIENT)
Dept: ELECTROPHYSIOLOGY | Facility: CLINIC | Age: 54
End: 2019-06-12
Payer: COMMERCIAL

## 2019-06-12 VITALS
DIASTOLIC BLOOD PRESSURE: 80 MMHG | WEIGHT: 256.38 LBS | BODY MASS INDEX: 33.98 KG/M2 | SYSTOLIC BLOOD PRESSURE: 120 MMHG | HEART RATE: 58 BPM | HEIGHT: 73 IN

## 2019-06-12 DIAGNOSIS — I48.19 PERSISTENT ATRIAL FIBRILLATION: Primary | ICD-10-CM

## 2019-06-12 DIAGNOSIS — I49.8 OTHER SPECIFIED CARDIAC ARRHYTHMIAS: ICD-10-CM

## 2019-06-12 DIAGNOSIS — G47.33 OBSTRUCTIVE SLEEP APNEA SYNDROME: ICD-10-CM

## 2019-06-12 DIAGNOSIS — Q21.10 ASD (ATRIAL SEPTAL DEFECT): ICD-10-CM

## 2019-06-12 PROCEDURE — 93005 RHYTHM STRIP: ICD-10-PCS | Mod: S$GLB,,, | Performed by: INTERNAL MEDICINE

## 2019-06-12 PROCEDURE — 93010 ELECTROCARDIOGRAM REPORT: CPT | Mod: S$GLB,,, | Performed by: INTERNAL MEDICINE

## 2019-06-12 PROCEDURE — 3008F PR BODY MASS INDEX (BMI) DOCUMENTED: ICD-10-PCS | Mod: S$GLB,,, | Performed by: INTERNAL MEDICINE

## 2019-06-12 PROCEDURE — 93005 ELECTROCARDIOGRAM TRACING: CPT | Mod: S$GLB,,, | Performed by: INTERNAL MEDICINE

## 2019-06-12 PROCEDURE — 99999 PR PBB SHADOW E&M-EST. PATIENT-LVL III: CPT | Mod: PBBFAC,,, | Performed by: INTERNAL MEDICINE

## 2019-06-12 PROCEDURE — 93010 RHYTHM STRIP: ICD-10-PCS | Mod: S$GLB,,, | Performed by: INTERNAL MEDICINE

## 2019-06-12 PROCEDURE — 99214 OFFICE O/P EST MOD 30 MIN: CPT | Mod: S$GLB,,, | Performed by: INTERNAL MEDICINE

## 2019-06-12 PROCEDURE — 99214 PR OFFICE/OUTPT VISIT, EST, LEVL IV, 30-39 MIN: ICD-10-PCS | Mod: S$GLB,,, | Performed by: INTERNAL MEDICINE

## 2019-06-12 PROCEDURE — 3008F BODY MASS INDEX DOCD: CPT | Mod: S$GLB,,, | Performed by: INTERNAL MEDICINE

## 2019-06-12 PROCEDURE — 99999 PR PBB SHADOW E&M-EST. PATIENT-LVL III: ICD-10-PCS | Mod: PBBFAC,,, | Performed by: INTERNAL MEDICINE

## 2019-06-12 RX ORDER — METRONIDAZOLE 10 MG/G
GEL TOPICAL
Refills: 5 | COMMUNITY
Start: 2019-05-07 | End: 2023-08-11

## 2019-06-12 RX ORDER — FINASTERIDE 1 MG/1
1 TABLET, FILM COATED ORAL DAILY
Refills: 3 | COMMUNITY
Start: 2019-05-29

## 2019-06-13 ENCOUNTER — RESEARCH ENCOUNTER (OUTPATIENT)
Dept: RESEARCH | Facility: HOSPITAL | Age: 54
End: 2019-06-13

## 2019-06-13 NOTE — PROGRESS NOTES
Date: 6/13/2019     Sponsor: [employer]     Study Title/IRB Number: [id number]     Principle Investigator: Erick Agustin MD     Visit: 6 month (phone call)      Patient was contacted via telephone for 6-month research follow-up; patient reports feeling well, medication changes noted no protocol adverse events reported.    AFEQT and EQ-5D completed per protocol.      All questions answered to patients satisfaction, instructed patient to call if he has any additional questions.     Next follow-up (via phone call) in approximately 6 months.

## 2019-07-03 ENCOUNTER — TELEPHONE (OUTPATIENT)
Dept: ELECTROPHYSIOLOGY | Facility: CLINIC | Age: 54
End: 2019-07-03

## 2019-07-03 ENCOUNTER — TELEPHONE (OUTPATIENT)
Dept: CARDIOLOGY | Facility: HOSPITAL | Age: 54
End: 2019-07-03

## 2019-07-03 DIAGNOSIS — I48.4 ATYPICAL ATRIAL FLUTTER: Primary | ICD-10-CM

## 2019-07-03 NOTE — TELEPHONE ENCOUNTER
Spoke with patient and scheduled DCCV, without JOS for Monday, 7/8/2019 with arrival time of 10am. He will call me if he goes back into a regular rhythm. Will email instructions to him.

## 2019-07-03 NOTE — TELEPHONE ENCOUNTER
Pt has been off Xarelto and Propafenone for 20 days.  Developed recurrence of elevated HR today.  ECG performed in local office reveals atypical atrial flutter.  He is symptomatic.    Discussed with patient.  Recommend:  Short term plan: Resume Xarelto.  He was given a Rx for metoprolol 25 mg BID.  I indicated it would be reasonable to start this.  We will schedule DCCV without JOS for Monday or Tuesday, if he remains out of rhythm.  Then resume propafenone.    Long term plan: Repeat PVI + RFA for atrial flutter.  JOS 1-2 days prior.  Hold Propafenone 3 days prior.

## 2019-07-03 NOTE — TELEPHONE ENCOUNTER
Spoke with patient. He has been off of propafenone and xarelto for 20 days. He thinks he may be back in AF this morning. He lives in [place]. I called his cardiologist's office, Dr Marie, at [phone]. They can do EKG at 11:45 this morning and will fax results to [phone]. Advised patient to make sure they fax the EKG right away so that Dr Agustin can review and resume Xarelto if needed. He verbalizes understanding.

## 2019-07-03 NOTE — TELEPHONE ENCOUNTER
----- Message from Joi Phipps MA sent at 7/3/2019 10:35 AM CDT -----  Contact: pt      ----- Message -----  From: Leeanna Ahn  Sent: 7/3/2019  10:14 AM  To: [facility] Staff    Type:  Needs Medical Advice    Who Called:  Pt  Symptoms (please be specific):    How long has patient had these symptoms:    Pharmacy name and phone #:    Would the patient rather a call back or a response via [facility]? call   Best Call Back Number:[phone]  Additional Information: Think he went back into A Fib been off medication 3 weeks and want Nurse to call him

## 2019-07-05 ENCOUNTER — TELEPHONE (OUTPATIENT)
Dept: ELECTROPHYSIOLOGY | Facility: CLINIC | Age: 54
End: 2019-07-05

## 2019-07-05 NOTE — TELEPHONE ENCOUNTER
Attempted to reach pt to confirm DCCV on Monday, no answer. Left voice message reviewing pre op instructions including: arrival time of 10 am, NPO after MN and to take xarelto night prior to procedure. Also, left number for pt to return call with any questions.

## 2019-07-05 NOTE — TELEPHONE ENCOUNTER
----- Message from Joi Phipps MA sent at 7/5/2019  2:26 PM CDT -----      ----- Message -----  From: Millie Kumar  Sent: 7/5/2019   2:10 PM  To: [facility] Staff    .Patient Returning Call from [facility]    Who Left Message for Patient: Magaly  Communication Preference:  Additional Information: Received message no questions

## 2019-07-08 ENCOUNTER — ANESTHESIA (OUTPATIENT)
Dept: MEDSURG UNIT | Facility: HOSPITAL | Age: 54
End: 2019-07-08
Payer: COMMERCIAL

## 2019-07-08 ENCOUNTER — ANESTHESIA EVENT (OUTPATIENT)
Dept: MEDSURG UNIT | Facility: HOSPITAL | Age: 54
End: 2019-07-08
Payer: COMMERCIAL

## 2019-07-08 ENCOUNTER — HOSPITAL ENCOUNTER (OUTPATIENT)
Facility: HOSPITAL | Age: 54
Discharge: HOME | End: 2019-07-08
Attending: INTERNAL MEDICINE | Admitting: INTERNAL MEDICINE
Payer: COMMERCIAL

## 2019-07-08 VITALS
SYSTOLIC BLOOD PRESSURE: 110 MMHG | DIASTOLIC BLOOD PRESSURE: 71 MMHG | WEIGHT: 250 LBS | HEART RATE: 70 BPM | OXYGEN SATURATION: 98 % | BODY MASS INDEX: 33.13 KG/M2 | TEMPERATURE: 98 F | RESPIRATION RATE: 18 BRPM | HEIGHT: 73 IN

## 2019-07-08 DIAGNOSIS — I48.91 ATRIAL FIBRILLATION: ICD-10-CM

## 2019-07-08 DIAGNOSIS — I48.4 ATYPICAL ATRIAL FLUTTER: Primary | ICD-10-CM

## 2019-07-08 PROCEDURE — 63600175 PHARM REV CODE 636 W HCPCS: Performed by: NURSE ANESTHETIST, CERTIFIED REGISTERED

## 2019-07-08 PROCEDURE — 93010 ELECTROCARDIOGRAM REPORT: CPT | Mod: 76,,, | Performed by: INTERNAL MEDICINE

## 2019-07-08 PROCEDURE — 93010 EKG 12-LEAD: ICD-10-PCS | Mod: 76,,, | Performed by: INTERNAL MEDICINE

## 2019-07-08 PROCEDURE — 25000003 PHARM REV CODE 250: Performed by: INTERNAL MEDICINE

## 2019-07-08 PROCEDURE — 92960 CARDIOVERSION ELECTRIC EXT: CPT | Mod: ,,, | Performed by: INTERNAL MEDICINE

## 2019-07-08 PROCEDURE — 93005 ELECTROCARDIOGRAM TRACING: CPT

## 2019-07-08 PROCEDURE — D9220A PRA ANESTHESIA: Mod: ,,, | Performed by: ANESTHESIOLOGY

## 2019-07-08 PROCEDURE — D9220A PRA ANESTHESIA: ICD-10-PCS | Mod: ,,, | Performed by: ANESTHESIOLOGY

## 2019-07-08 PROCEDURE — 93010 ELECTROCARDIOGRAM REPORT: CPT | Mod: ,,, | Performed by: INTERNAL MEDICINE

## 2019-07-08 PROCEDURE — 92960 PR CARDIOVERSION, ELECTIVE;EXTERN: ICD-10-PCS | Mod: ,,, | Performed by: INTERNAL MEDICINE

## 2019-07-08 PROCEDURE — 37000008 HC ANESTHESIA 1ST 15 MINUTES: Performed by: INTERNAL MEDICINE

## 2019-07-08 PROCEDURE — 92960 CARDIOVERSION ELECTRIC EXT: CPT | Performed by: INTERNAL MEDICINE

## 2019-07-08 PROCEDURE — 37000009 HC ANESTHESIA EA ADD 15 MINS: Performed by: INTERNAL MEDICINE

## 2019-07-08 RX ORDER — PROPOFOL 10 MG/ML
VIAL (ML) INTRAVENOUS
Status: DISCONTINUED | OUTPATIENT
Start: 2019-07-08 | End: 2019-07-08

## 2019-07-08 RX ORDER — SODIUM CHLORIDE 0.9 % (FLUSH) 0.9 %
3 SYRINGE (ML) INJECTION
Status: DISCONTINUED | OUTPATIENT
Start: 2019-07-08 | End: 2019-07-08 | Stop reason: HOSPADM

## 2019-07-08 RX ORDER — SODIUM CHLORIDE 9 MG/ML
INJECTION, SOLUTION INTRAVENOUS CONTINUOUS
Status: DISCONTINUED | OUTPATIENT
Start: 2019-07-08 | End: 2019-07-08 | Stop reason: HOSPADM

## 2019-07-08 RX ORDER — LIDOCAINE HCL/PF 100 MG/5ML
SYRINGE (ML) INTRAVENOUS
Status: DISCONTINUED | OUTPATIENT
Start: 2019-07-08 | End: 2019-07-08

## 2019-07-08 RX ORDER — SILVER SULFADIAZINE 10 G/1000G
CREAM TOPICAL
Status: DISCONTINUED | OUTPATIENT
Start: 2019-07-08 | End: 2019-07-08 | Stop reason: HOSPADM

## 2019-07-08 RX ADMIN — PROPOFOL 50 MG: 10 INJECTION, EMULSION INTRAVENOUS at 11:07

## 2019-07-08 RX ADMIN — SODIUM CHLORIDE: 0.9 INJECTION, SOLUTION INTRAVENOUS at 11:07

## 2019-07-08 RX ADMIN — LIDOCAINE HYDROCHLORIDE 100 MG: 20 INJECTION, SOLUTION INTRAVENOUS at 11:07

## 2019-07-08 RX ADMIN — PROPOFOL 60 MG: 10 INJECTION, EMULSION INTRAVENOUS at 11:07

## 2019-07-08 NOTE — TRANSFER OF CARE
"Anesthesia Transfer of Care Note    Patient: Juan Luis Guadalupe    Procedure(s) Performed: Procedure(s) (LRB):  Cardioversion or Defibrillation (N/A)    Patient location: PACU    Anesthesia Type: general    Transport from OR: Transported from OR on 2-3 L/min O2 by NC with adequate spontaneous ventilation    Post pain: adequate analgesia    Post assessment: no apparent anesthetic complications and tolerated procedure well    Post vital signs: stable    Level of consciousness: awake, alert and oriented    Nausea/Vomiting: no nausea/vomiting    Complications: none    Transfer of care protocol was followed      Last vitals:   Visit Vitals  /62 (BP Location: Right arm, Patient Position: Sitting)   Pulse 88   Temp 36.2 °C (97.1 °F) (Oral)   Resp 18   Ht 6' 1" (1.854 m)   Wt 113.4 kg (250 lb)   SpO2 97%   BMI 32.98 kg/m²     "

## 2019-07-08 NOTE — NURSING TRANSFER
Nursing Transfer Note      7/8/2019     Transfer To: 7a    Transfer via stretcher    Transfer with chart    Transported by escort    Medicines sent: silvadene cream    Chart send with patient: Yes    Notified: reported to linwood coats rn    Patient reassessed at: see epic (date, time)    Upon arrival to floor: to room no complaints no distress noted.

## 2019-07-08 NOTE — ANESTHESIA PREPROCEDURE EVALUATION
07/08/2019  Pre-operative evaluation for Procedure(s) (LRB):  Cardioversion or Defibrillation (N/A)    Juan Luis Guadalupe is a 53 y.o. male a fib, ASD (not repaired), CHRISTIANO.     Patient Active Problem List   Diagnosis    Persistent atrial fibrillation    ASD (atrial septal defect)    Tongue lesion    Chronic anticoagulation    Obstructive sleep apnea syndrome    Atypical atrial flutter       Review of patient's allergies indicates:  No Known Allergies    No current facility-administered medications on file prior to encounter.      Current Outpatient Medications on File Prior to Encounter   Medication Sig Dispense Refill    finasteride (PROPECIA) 1 mg tablet Take 1 mg by mouth once daily.  3    metoprolol succinate (TOPROL-XL) 25 MG 24 hr tablet Take 25 mg by mouth 2 (two) times daily.      rivaroxaban (XARELTO) 20 mg Tab Take 20 mg by mouth daily with dinner or evening meal.      metronidazole 1% (METROGEL) 1 % Gel   5       Past Surgical History:   Procedure Laterality Date    ABLATION, ARRHYTHMOGENIC FOCUS, FOR ATRIAL FIBRILLATION N/A 12/14/2018    Performed by [facility]ustin [facility] at [facility]    CARDIOVERSION N/A 12/17/2018    Performed by [facility] MD at [facility] EP [facility]    Cardioversion or Defibrillation  12/14/2018    Performed by [facility]ustin MD at [facility] EP [facility]    Transesophageal echo (JOS) intra-procedure log documentation N/A 12/13/2018    Performed by [facility] at [facility] EP [facility]       Social History     Socioeconomic History    Marital status: Single     Spouse name: Not on file    Number of children: Not on file    Years of education: Not on file    Highest education level: Not on file   Occupational History    Not on file   Social Needs    Financial resource strain: Not on file    Food insecurity:     Worry: Not on file     Inability: Not on file    Transportation  needs:     Medical: Not on file     Non-medical: Not on file   Tobacco Use    Smoking status: Never Smoker    Smokeless tobacco: Never Used   Substance and Sexual Activity    Alcohol use: Yes     Alcohol/week: 1.2 oz     Types: 2 Glasses of wine per week     Comment: occassional    Drug use: No    Sexual activity: Not on file   Lifestyle    Physical activity:     Days per week: Not on file     Minutes per session: Not on file    Stress: Not on file   Relationships    Social connections:     Talks on phone: Not on file     Gets together: Not on file     Attends Mu-ism service: Not on file     Active member of club or organization: Not on file     Attends meetings of clubs or organizations: Not on file     Relationship status: Not on file   Other Topics Concern    Not on file   Social History Narrative    Not on file         CBC: No results for input(s): WBC, RBC, HGB, HCT, PLT, MCV, MCH, MCHC in the last 72 hours.    CMP: No results for input(s): NA, K, CL, CO2, BUN, CREATININE, GLU, MG, PHOS, CALCIUM, ALBUMIN, PROT, ALKPHOS, ALT, AST, BILITOT in the last 72 hours.    INR  No results for input(s): PT, INR, PROTIME, APTT in the last 72 hours.        Diagnostic Studies:      EKD Echo:  No results found for this or any previous visit.      Anesthesia Evaluation    I have reviewed the Patient Summary Reports.     I have reviewed the Medications.     Review of Systems  Anesthesia Hx:  History of prior surgery of interest to airway management or planning: Denies Family Hx of Anesthesia complications.   Denies Personal Hx of Anesthesia complications.       Physical Exam  General:  Obesity    Airway/Jaw/Neck:  Airway Findings: Mouth Opening: Normal Tongue: Normal  General Airway Assessment: Adult  Mallampati: II  TM Distance: Normal, at least 6 cm  Jaw/Neck Findings:  Neck ROM: Normal ROM      Dental:  Dental Findings: In tact   Chest/Lungs:  Chest/Lungs Findings: Clear to auscultation, Normal  Respiratory Rate         Mental Status:  Mental Status Findings:  Cooperative, Alert and Oriented         Anesthesia Plan  Type of Anesthesia, risks & benefits discussed:  Anesthesia Type:  general  Patient's Preference:   Intra-op Monitoring Plan: standard ASA monitors  Intra-op Monitoring Plan Comments:   Post Op Pain Control Plan: multimodal analgesia  Post Op Pain Control Plan Comments:   Induction:   IV  Beta Blocker:  Patient is not currently on a Beta-Blocker (No further documentation required).       Informed Consent: Patient understands risks and agrees with Anesthesia plan.  Questions answered. Anesthesia consent signed with patient.  ASA Score: 3     Day of Surgery Review of History & Physical:    H&P update referred to the surgeon.         Ready For Surgery From Anesthesia Perspective.

## 2019-07-08 NOTE — PLAN OF CARE
Problem: Adult Inpatient Plan of Care  Goal: Plan of Care Review  Outcome: Ongoing (interventions implemented as appropriate)  Report received from CHRIS Alvarez. Patient s/p dccv. Vss. No complaints from patient. Call light in reach. Family at bedside. Will monitor.

## 2019-07-08 NOTE — PROGRESS NOTES
Patient admitted to recovery see [facility] for complete assessment pacu bcg's maintained safety measures verified paitent instructed on pain scale and patient verbalized understanding. ekd done and placed in patient's chart. Also called patient's finance and updated on patient location with the permission of patient.

## 2019-07-08 NOTE — INTERVAL H&P NOTE
Mr. Guadalupe is a 53 year old male with PMHx of AF here for DCCV.    Per Dr. Agustin on 7/3/19, patient has been off Xarelto and Propafenone for 20 days.  Developed recurrence of elevated HR on 7/3/19.  ECG performed in local office reveals atypical atrial flutter.  He is symptomatic.  Discussed with patient.  Recommend:  Short term plan: Resume Xarelto today (7/3/19).  He was given a Rx for metoprolol 25 mg BID.  I indicated it would be reasonable to start this.  We will schedule DCCV without JOS for Monday or Tuesday, if he remains out of rhythm.  Then resume propafenone.    Long term plan: Repeat PVI + RFA for atrial flutter.  JOS 1-2 days prior.  Hold Propafenone 3 days prior.  The patient has been examined and the H&P has been reviewed:    He presents today to [facility] for scheduled DCCV with Dr. Agustin. He denies any chest pain, SOB, dizziness, light headedness, weakness, syncope, or near syncopal episodes. He does state he has palpitations. He denies any bleeding, fevers, infections, rashes, or surgeries in the past 30 days. Verified with Dr. Agustin, defer JOS per Dr. Agustin as patient has been 100 % compliant with his Xarelto. Patient knows he went out of rhythm on 7/3/19 via his phone Nik and resumed Xarelto that day. Patient denies missing any doses of xarelto. He denies HX of CVA or TIA, or BHUPENDRA thrombus.  I have personally reviewed the patient's EKG today, which shows AF at 81 bpm.    I concur with the findings and no changes have occurred since H&P was written.     Review of Systems   Constitution: Negative. Negative for fevers/chills, weakness and malaise/fatigue.   HENT: Negative.  Negative for ear pain and tinnitus.    Eyes: Negative for blurred vision.   Cardiovascular: Negative. Negative for chest pain, dyspnea on exertion, leg swelling, near-syncope, palpitations and syncope.   Respiratory: Negative. Negative for shortness of breath.    Endocrine: Negative.  Negative for polyuria.   Hematologic/Lymphatic:  Negative for significant bleeding or bruise/bleed easily.   Skin: Negative.  Negative for rash.   Musculoskeletal: Negative.  Negative for joint pain and muscle weakness.   Gastrointestinal: Negative.  Negative for abdominal pain hematemesis, hematochezia, and change in bowel habit.   Genitourinary: Negative for frequency or hematuria.   Neurological: Negative.  Negative for dizziness.   Psychiatric/Behavioral: Negative.  Negative for depression. The patient is not nervous/anxious.       Physical Exam  Constitutional: He is oriented to person, place, and time. He appears well-developed and well-nourished.   HENT:   Head: Normocephalic and atraumatic.   Eyes: Conjunctivae, EOM and lids are normal. No scleral icterus.   Neck: Normal range of motion. No JVD present. No tracheal deviation present.  Cardiovascular: Normal rate and intact distal pulses. Irregularly irregular rhythm present. PMI is not displaced. Exam reveals no gallop and no friction rub. No murmur heard.  Pulses:       Radial pulses are 2+ on the right side, and 2+ on the left side.   Pulmonary/Chest: Effort normal and breath sounds normal. No accessory muscle usage. No tachypnea. No respiratory distress. He has no wheezes. He has no rales.   Abdominal: Soft. Bowel sounds are normal. He exhibits no distension. There is no tenderness.   Musculoskeletal: Normal range of motion. He exhibits no edema.   Neurological: He is alert and oriented to person, place, and time. He has normal reflexes. He exhibits normal muscle tone.   Skin: Skin is warm and dry. No rash noted.   Psychiatric: He has a normal mood and affect. His behavior is normal.   Nursing note and vitals reviewed.    Active Hospital Problems    Diagnosis  POA    Atypical atrial flutter [I48.4]  Yes      Resolved Hospital Problems   No resolved problems to display.   Labs: Outside labs reviewed.      Significant Studies: EKG this AM reveals atrial fibrillation at 86 BPM. EKG reviewed by   Nikole.     Plan:  - Planned for straight DCCV  - Patient reports full compliance with xarelto, has been taking medications as prescribed without missing any doses since 7/3/19. Last dose was yesterday evening (7/7/19).   - Anesthesia for sedation. Anesthesia/Surgery risks, benefits and alternative options discussed and understood by patient/family.    Prior to procedure, extensive discussion with patient regarding risks and benefits of DCCV, and patient would like to proceed. Dr. Agustinat bedside to answer all questions.  The patient voices understanding and all questions have been answered. No further questions/concerns voiced at this time.     ESTELA Kenny-C  Cardiology Electrophysiology  NP   [facility]    Attending: Erick Agustin MD

## 2019-07-09 ENCOUNTER — TELEPHONE (OUTPATIENT)
Dept: ELECTROPHYSIOLOGY | Facility: CLINIC | Age: 54
End: 2019-07-09

## 2019-07-09 DIAGNOSIS — I48.4 ATYPICAL ATRIAL FLUTTER: Primary | ICD-10-CM

## 2019-07-09 NOTE — DISCHARGE SUMMARY
[facility]  Cardiac Electrophysiology  Discharge Summary      Patient Name: Juan Luis Guadalupe  MRN: [id number]  Admission Date: 7/8/2019  Hospital Length of Stay: 0 days  Discharge Date and Time: 7/8/2019  1:01 PM  Attending Physician: Erick Agustin MD  Discharging Provider: Carolyn Nelson NP  Primary Care Physician: Primary Doctor No    HPI: Mr. Guadalupe is a 53 year old male with PMHx of AF here for DCCV.  Per Dr. Agustin on 7/3/19, patient has been off Xarelto and Propafenone for 20 days.  Developed recurrence of elevated HR on 7/3/19.  ECG performed in local office reveals atypical atrial flutter.  He is symptomatic.  Discussed with patient.  Recommend:  Short term plan: Resume Xarelto today (7/3/19).  He was given a Rx for metoprolol 25 mg BID.  I indicated it would be reasonable to start this.  We will schedule DCCV without JOS for Monday or Tuesday, if he remains out of rhythm. Then resume propafenone.    Long term plan: Repeat PVI + RFA for atrial flutter.  JOS 1-2 days prior.  Hold Propafenone 3 days prior.  The patient has been examined and the H&P has been reviewed:     He presents today to [facility] for scheduled DCCV with Dr. Agustin. He denies any chest pain, SOB, dizziness, light headedness, weakness, syncope, or near syncopal episodes. He does state he has palpitations. He denies any bleeding, fevers, infections, rashes, or surgeries in the past 30 days. Verified with Dr. Agustin, defer JOS per Dr. Agustin as patient has been 100 % compliant with his Xarelto. Patient knows he went out of rhythm on 7/3/19 via his phone Nik and resumed Xarelto that day. Patient denies missing any doses of xarelto. He denies HX of CVA or TIA, or BHUPENDRA thrombus.  I have personally reviewed the patient's EKG today, which shows AF at 81 bpm.     I concur with the findings and no changes have occurred since H&P was written.      Procedure(s) (LRB):  Cardioversion or Defibrillation (N/A)     Indwelling Lines/Drains  at time of discharge:  Lines/Drains/Airways          None        Hospital Course: Patient presented in atrial fibrillation. Patient on Eliquis. JOS defered per Dr. Agustin as patient has been 100 % compliant with his Xarelto. DCCV completed with restoration of normal sinus rhythm. Patient tolerated procedure well with no acute complications. Post procedure EKG showed normal sinus rhythm with a ventricular rate of 66 bpm. Plan to continue home medications including xarelto, metoprolol, and propafenone. Follow up EKG in 1, patient will have this done in [place]. Follow up with Dr. Agustin for scheduled re-do PVI + RFA AFL. Discharge plans/instructions discussed with patient and wife who verbalized understanding and agreement of plans of care. No further questions or concerns voiced at this time. Discharged home in stable condition.     Physical Exam  Constitutional: He is oriented to person, place, and time. He appears well-developed and well-nourished.   HENT:   Head: Normocephalic and atraumatic.   Eyes: Conjunctivae, EOM and lids are normal. No scleral icterus.   Neck: Normal range of motion. No JVD present. No tracheal deviation present.  Cardiovascular: Normal rate and intact distal pulses. Irregularly irregular rhythm present. PMI is not displaced. Exam reveals no gallop and no friction rub. No murmur heard.  Pulses:       Radial pulses are 2+ on the right side, and 2+ on the left side.   Pulmonary/Chest: Effort normal and breath sounds normal. No accessory muscle usage. No tachypnea. No respiratory distress. He has no wheezes. He has no rales.   Abdominal: Soft. Bowel sounds are normal. He exhibits no distension. There is no tenderness.   Musculoskeletal: Normal range of motion. He exhibits no edema.   Neurological: He is alert and oriented to person, place, and time. He has normal reflexes. He exhibits normal muscle tone.   Skin: Skin is warm and dry. No rash noted.   Psychiatric: He has a normal mood and  affect. His behavior is normal.   Nursing note and vitals reviewed.    Consults:   Anesthesia.    Significant Diagnostic Studies: Outside labs reviewed.   Lab Results   Component Value Date    INR 1.1 12/13/2018     Final Active Diagnoses:    Diagnosis Date Noted POA    PRINCIPAL PROBLEM:  Atypical atrial flutter [I48.4] 07/08/2019 Yes      Problems Resolved During this Admission:     No new Assessment & Plan notes have been filed under this hospital service since the last note was generated.  Service: Arrhythmia    Discharged Condition: stable    Disposition: Home or Self Care    Follow Up:  Follow-up Information     EKG 1 [facility] In 1 week.    Specialty:  Cardiology  Why:  s/p DCCV           Erick Agustin MD On 8/27/2019.    Specialties:  Electrophysiology, Cardiology  Why:  For scheduled procedure  Contact information:  [address] [place] [address] [phone]                 Patient Instructions:      Diet Cardiac     No driving until:   Order Comments: No driving or operating heavy machinery for 24-48 hours after your procedure because you received sedation.     Other restrictions (specify):   Order Comments: Medications:  -Continue to take your home medications as listed on your medication list after you are discharged.  -If you have problems or side effects caused by your medications, call your Physician.    New Medications:  Resume your propafenone at your prescribed dose as Dr. Agustin discussed.    Diet  -You may resume oral intake after you are discharged, as long you have no swallowing difficulties.    Side effects:  -You may be drowsy for the remainder of the day because you received sedation.    Because you have received sedation for this procedure:  -Limit activity for the remainder of the day.  -Do not drive or operate any equipment for the remainder of the day.  -Do not smoke for at least 6 hours and until you are fully awake and alert.  -Do not drink alcoholic beverage for 24  hours.  -Defer important decision making until the following day.    Go to the Emergency Department if you develop:  -Bleeding  -Weakness or numbness  -Visual, gait or speech disturbance  -New chest pain, palpitations, shortness of breath, rapid heart beat, or fainting  -Fever    Follow up:  -EKG in 1 week.  -Dr. Agustin for scheduled procedure. Date scheduled for 8/27/19. CHRIS Doll will call with instructions for procedure.     Notify your health care provider if you experience any of the following:   Order Comments: For any concerning symptoms.     Notify your health care provider if you experience any of the following:  increased confusion or weakness     Notify your health care provider if you experience any of the following:  persistent dizziness, light-headedness, or visual disturbances     Notify your health care provider if you experience any of the following:  worsening rash     Notify your health care provider if you experience any of the following:  severe persistent headache     Notify your health care provider if you experience any of the following:  difficulty breathing or increased cough     Notify your health care provider if you experience any of the following:  redness, tenderness, or signs of infection (pain, swelling, redness, odor or green/yellow discharge around incision site)     Notify your health care provider if you experience any of the following:  severe uncontrolled pain     Notify your health care provider if you experience any of the following:  persistent nausea and vomiting or diarrhea     Notify your health care provider if you experience any of the following:  temperature >100.4     Medications:  Reconciled Home Medications:      Medication List      CONTINUE taking these medications    finasteride 1 mg tablet  Commonly known as:  PROPECIA  Take 1 mg by mouth once daily.     metoprolol succinate 25 MG 24 hr tablet  Commonly known as:  TOPROL-XL  Take 25 mg by mouth 2 (two) times  daily.     metronidazole 1% 1 % Gel  Commonly known as:  METROGEL     rivaroxaban 20 mg Tab  Commonly known as:  XARELTO  Take 20 mg by mouth daily with dinner or evening meal.          Plan:  -Continue all home medications.  -Resume taking propafenone at the prescribed dose.  -EKG in 1 week.  -Follow up with Dr. Agustin for repeat PVI + RFA for atrial flutter. Date scheduled for 8/27/19. CHRIS Doll will call with instructions for procedure.    Time spent on the discharge of patient: 21 minutes    Carolyn Nelson NP  Cardiac Electrophysiology  [facility]    Attending: Erick Agustin MD

## 2019-07-09 NOTE — ANESTHESIA POSTPROCEDURE EVALUATION
Anesthesia Post Evaluation    Patient: Juan Luis Guadalupe    Procedure(s) Performed: Procedure(s) (LRB):  Cardioversion or Defibrillation (N/A)    Final Anesthesia Type: general  Patient location during evaluation: PACU  Patient participation: Yes- Able to Participate  Level of consciousness: awake and alert and oriented  Post-procedure vital signs: reviewed and stable  Pain management: adequate  Airway patency: patent  PONV status at discharge: No PONV  Anesthetic complications: no      Cardiovascular status: hemodynamically stable  Respiratory status: unassisted  Hydration status: euvolemic  Follow-up not needed.          Vitals Value Taken Time   /71 7/8/2019 12:45 PM   Temp 36.6 °C (97.9 °F) 7/8/2019 12:45 PM   Pulse 70 7/8/2019 12:45 PM   Resp 18 7/8/2019 12:45 PM   SpO2 98 % 7/8/2019 12:45 PM         Event Time     Out of Recovery 12:35:00          Pain/Kimberly Score: Kimberly Score: 10 (7/8/2019 12:39 PM)

## 2019-07-11 ENCOUNTER — RESEARCH ENCOUNTER (OUTPATIENT)
Dept: RESEARCH | Facility: HOSPITAL | Age: 54
End: 2019-07-11

## 2019-07-11 NOTE — PROGRESS NOTES
7/11/2019     Sponsor: [employer]     Study Title/IRB Number: [id number]     Principle Investigator: Erick Agustin MD     Visit: Post 6-month holter phone call     Contacted patient when 6 month holter report was received.    He states that he had no symptoms during his holter monitoring & reports feeling well.

## 2019-08-19 DIAGNOSIS — I48.19 PERSISTENT ATRIAL FIBRILLATION: ICD-10-CM

## 2019-08-19 DIAGNOSIS — I48.4 ATYPICAL ATRIAL FLUTTER: Primary | ICD-10-CM

## 2019-08-21 ENCOUNTER — TELEPHONE (OUTPATIENT)
Dept: ELECTROPHYSIOLOGY | Facility: CLINIC | Age: 54
End: 2019-08-21

## 2019-08-22 NOTE — TELEPHONE ENCOUNTER
Spoke to  Mr. Guadalupe    CONFIRMED procedure arrival time of 530am on 8/27  Reiterated instructions including:  -Directions to check in desk  -NPO after midnight night prior to procedure  -High importance of HOLDING Propafenone X 3 days prior to procedure.   -Confirmed compliance of Xarelto  -Pre-procedure LABS 8/22  -Confirmed no recent fever, bleeding, infection or skin rash in the past 30 days       Pt verbalizes understanding of above and appreciates call.

## 2019-08-26 ENCOUNTER — HOSPITAL ENCOUNTER (OUTPATIENT)
Dept: CARDIOLOGY | Facility: CLINIC | Age: 54
Discharge: HOME | End: 2019-08-26
Attending: INTERNAL MEDICINE | Admitting: INTERNAL MEDICINE
Payer: COMMERCIAL

## 2019-08-26 ENCOUNTER — HOSPITAL ENCOUNTER (OUTPATIENT)
Facility: HOSPITAL | Age: 54
Discharge: HOME | End: 2019-08-26
Attending: INTERNAL MEDICINE | Admitting: INTERNAL MEDICINE
Payer: COMMERCIAL

## 2019-08-26 ENCOUNTER — ANESTHESIA EVENT (OUTPATIENT)
Dept: MEDSURG UNIT | Facility: HOSPITAL | Age: 54
End: 2019-08-26
Payer: COMMERCIAL

## 2019-08-26 ENCOUNTER — ANESTHESIA (OUTPATIENT)
Dept: MEDSURG UNIT | Facility: HOSPITAL | Age: 54
End: 2019-08-26
Payer: COMMERCIAL

## 2019-08-26 ENCOUNTER — TELEPHONE (OUTPATIENT)
Dept: ELECTROPHYSIOLOGY | Facility: CLINIC | Age: 54
End: 2019-08-26

## 2019-08-26 VITALS
HEART RATE: 57 BPM | SYSTOLIC BLOOD PRESSURE: 125 MMHG | WEIGHT: 250 LBS | OXYGEN SATURATION: 97 % | RESPIRATION RATE: 18 BRPM | DIASTOLIC BLOOD PRESSURE: 74 MMHG | TEMPERATURE: 97 F | HEIGHT: 73 IN | BODY MASS INDEX: 33.13 KG/M2

## 2019-08-26 DIAGNOSIS — I48.4 ATYPICAL ATRIAL FLUTTER: ICD-10-CM

## 2019-08-26 DIAGNOSIS — I48.19 PERSISTENT ATRIAL FIBRILLATION: ICD-10-CM

## 2019-08-26 DIAGNOSIS — I48.91 ATRIAL FIBRILLATION: ICD-10-CM

## 2019-08-26 LAB
AORTIC ROOT ANNULUS: 2.4 CM
BSA FOR ECHO PROCEDURE: 2.42 M2
PISA TR MAX VEL: 2.57 M/S
SINUS: 3.5 CM
STJ: 3 CM
TR MAX PG: 26 MMHG

## 2019-08-26 PROCEDURE — 93325 DOPPLER ECHO COLOR FLOW MAPG: CPT | Mod: 26,,, | Performed by: INTERNAL MEDICINE

## 2019-08-26 PROCEDURE — 93320 DOPPLER ECHO COMPLETE: CPT | Mod: 26,,, | Performed by: INTERNAL MEDICINE

## 2019-08-26 PROCEDURE — 93325 DOPPLER ECHO COLOR FLOW MAPG: CPT

## 2019-08-26 PROCEDURE — 93312 TRANSESOPHAGEAL ECHO (TEE) (CUPID ONLY): ICD-10-PCS | Mod: 26,,, | Performed by: INTERNAL MEDICINE

## 2019-08-26 PROCEDURE — D9220A PRA ANESTHESIA: ICD-10-PCS | Mod: ,,, | Performed by: ANESTHESIOLOGY

## 2019-08-26 PROCEDURE — 93005 ELECTROCARDIOGRAM TRACING: CPT

## 2019-08-26 PROCEDURE — 93325 TRANSESOPHAGEAL ECHO (TEE) (CUPID ONLY): ICD-10-PCS | Mod: 26,,, | Performed by: INTERNAL MEDICINE

## 2019-08-26 PROCEDURE — 37000008 HC ANESTHESIA 1ST 15 MINUTES

## 2019-08-26 PROCEDURE — 93312 ECHO TRANSESOPHAGEAL: CPT | Mod: 26,,, | Performed by: INTERNAL MEDICINE

## 2019-08-26 PROCEDURE — 25000003 PHARM REV CODE 250: Performed by: NURSE ANESTHETIST, CERTIFIED REGISTERED

## 2019-08-26 PROCEDURE — 93010 ELECTROCARDIOGRAM REPORT: CPT | Mod: ,,, | Performed by: INTERNAL MEDICINE

## 2019-08-26 PROCEDURE — 93010 EKG 12-LEAD: ICD-10-PCS | Mod: ,,, | Performed by: INTERNAL MEDICINE

## 2019-08-26 PROCEDURE — 93320 TRANSESOPHAGEAL ECHO (TEE) (CUPID ONLY): ICD-10-PCS | Mod: 26,,, | Performed by: INTERNAL MEDICINE

## 2019-08-26 PROCEDURE — 37000009 HC ANESTHESIA EA ADD 15 MINS

## 2019-08-26 PROCEDURE — D9220A PRA ANESTHESIA: Mod: ,,, | Performed by: ANESTHESIOLOGY

## 2019-08-26 PROCEDURE — 63600175 PHARM REV CODE 636 W HCPCS: Performed by: NURSE ANESTHETIST, CERTIFIED REGISTERED

## 2019-08-26 RX ORDER — FENTANYL CITRATE 50 UG/ML
25 INJECTION, SOLUTION INTRAMUSCULAR; INTRAVENOUS EVERY 5 MIN PRN
Status: DISCONTINUED | OUTPATIENT
Start: 2019-08-26 | End: 2019-08-26 | Stop reason: HOSPADM

## 2019-08-26 RX ORDER — PROPOFOL 10 MG/ML
VIAL (ML) INTRAVENOUS CONTINUOUS PRN
Status: DISCONTINUED | OUTPATIENT
Start: 2019-08-26 | End: 2019-08-26

## 2019-08-26 RX ORDER — LIDOCAINE HCL/PF 100 MG/5ML
SYRINGE (ML) INTRAVENOUS
Status: DISCONTINUED | OUTPATIENT
Start: 2019-08-26 | End: 2019-08-26

## 2019-08-26 RX ORDER — HYDROMORPHONE HYDROCHLORIDE 1 MG/ML
0.2 INJECTION, SOLUTION INTRAMUSCULAR; INTRAVENOUS; SUBCUTANEOUS EVERY 5 MIN PRN
Status: DISCONTINUED | OUTPATIENT
Start: 2019-08-26 | End: 2019-08-26 | Stop reason: HOSPADM

## 2019-08-26 RX ORDER — SODIUM CHLORIDE 9 MG/ML
INJECTION, SOLUTION INTRAVENOUS CONTINUOUS PRN
Status: DISCONTINUED | OUTPATIENT
Start: 2019-08-26 | End: 2019-08-26

## 2019-08-26 RX ORDER — LIDOCAINE HYDROCHLORIDE 20 MG/ML
SOLUTION OROPHARYNGEAL
Status: DISCONTINUED | OUTPATIENT
Start: 2019-08-26 | End: 2019-08-26

## 2019-08-26 RX ORDER — DIPHENHYDRAMINE HYDROCHLORIDE 50 MG/ML
25 INJECTION INTRAMUSCULAR; INTRAVENOUS EVERY 6 HOURS PRN
Status: DISCONTINUED | OUTPATIENT
Start: 2019-08-26 | End: 2019-08-26 | Stop reason: HOSPADM

## 2019-08-26 RX ORDER — PROPOFOL 10 MG/ML
VIAL (ML) INTRAVENOUS
Status: DISCONTINUED | OUTPATIENT
Start: 2019-08-26 | End: 2019-08-26

## 2019-08-26 RX ADMIN — LIDOCAINE HYDROCHLORIDE 100 MG: 20 INJECTION, SOLUTION INTRAVENOUS at 01:08

## 2019-08-26 RX ADMIN — PROPOFOL 80 MG: 10 INJECTION, EMULSION INTRAVENOUS at 01:08

## 2019-08-26 RX ADMIN — LIDOCAINE HYDROCHLORIDE 5 ML: 20 SOLUTION ORAL; TOPICAL at 01:08

## 2019-08-26 RX ADMIN — PROPOFOL 150 MCG/KG/MIN: 10 INJECTION, EMULSION INTRAVENOUS at 01:08

## 2019-08-26 RX ADMIN — SODIUM CHLORIDE: 9 INJECTION, SOLUTION INTRAVENOUS at 01:08

## 2019-08-26 NOTE — TELEPHONE ENCOUNTER
Spoke to patient    CONFIRMED procedure arrival time of 5:30am for PVI RFA  Reiterated instructions including:  -Directions to check in desk  -NPO after midnight night prior to procedure  -High importance of taking Xarelto with evening meal today  -Pre-procedure LABS received and reviewed, no alerts noted.  -Confirmed no recent fever, bleeding, infection or skin rash in the past 30 days  -JOS done today - no LA or BHUPENDRA thrombus       Patient verbalizes understanding of above and appreciates call.

## 2019-08-26 NOTE — PROGRESS NOTES
Patient admitted to recovery see [facility] for complete assessment pacu bcg's maintained safty measures verified patient instructed on pain scale and patient verbalized understanding. Orders being carried out .

## 2019-08-26 NOTE — ANESTHESIA POSTPROCEDURE EVALUATION
Anesthesia Post Evaluation    Patient: Juan Lius Guadalupe    Procedure(s) Performed: Procedure(s) (LRB):  Transesophageal echo (JOS) intra-procedure log documentation (N/A)    Final Anesthesia Type: general  Patient location during evaluation: PACU  Patient participation: Yes- Able to Participate  Level of consciousness: awake and alert  Post-procedure vital signs: reviewed and stable  Pain management: adequate  Airway patency: patent  PONV status at discharge: No PONV  Anesthetic complications: no      Cardiovascular status: hemodynamically stable  Respiratory status: unassisted  Hydration status: euvolemic  Follow-up not needed.          Vitals Value Taken Time   /74 8/26/2019  2:45 PM   Temp 36.1 °C (97 °F) 8/26/2019  2:45 PM   Pulse 57 8/26/2019  2:45 PM   Resp 18 8/26/2019  2:45 PM   SpO2 98 % 8/26/2019  2:42 PM   Vitals shown include unvalidated device data.      No case tracking events are documented in the log.      Pain/Kimberly Score: No data recorded

## 2019-08-26 NOTE — H&P
[facility]  Cardiology  History and Physical     Patient Name: Juan Luis Guadalupe  MRN: [id number]  Admission Date: 8/26/2019  Code Status: Prior   Attending Provider: Erick Agustin MD   Primary Care Physician: Primary Doctor No  Principal Problem:<principal problem not specified>    Patient information was obtained from patient and ER records.     Subjective:     Chief Complaint:  Atrial fibrillation     HPI:  54 year old male here for JOS in preparation for PVI tomorrow, he has a history of AF, ASD and CHRISTIANO. He reports feeling somewhat fatigued, but denies symptoms of HF or angina.    Dysphagia or odynophagia:  No  Liver Disease, esophageal disease, or known varices:  No  Upper GI Bleeding: No  Snoring:  Yes  Sleep Apnea:  Yes  Prior neck surgery or radiation:  No  History of anesthetic difficulties:  No  Family history of anesthetic difficulties:  No  Last oral intake:  12 hours ago  Able to move neck in all directions:  Yes        TTE 12/13/18    Conclusion     · Normal left ventricular systolic function. The estimated ejection fraction is 63% on this limited non-Doppler echo.  · No wall motion abnormalities.  · Severe left atrial enlargement.  · Indeterminate left ventricular diastolic function.  · Low normal right ventricular systolic function.  · Mild right ventricular enlargement.  · Moderate right atrial enlargement.  · Normal central venous pressure (3 mm Hg).  · Trivial pericardial effusion.      JOS 12/13/18    Conclusion     · Normal left ventricular systolic function. The estimated ejection fraction is 60%  · Normal right ventricular systolic function.  · Atrial enlargement.  · Mild mitral regurgitation.  · Trace tricuspid regurgitation.  · Interatrial septal defect present.  · Normal appearing left atrial appendage. No thrombus is present in the appendage. Normal appendage velocities.      Past Medical History:   Diagnosis Date    Anticoagulant long-term use     ASD (atrial septal  defect)     Atrial fibrillation        Past Surgical History:   Procedure Laterality Date    ABLATION, ARRHYTHMOGENIC FOCUS, FOR ATRIAL FIBRILLATION N/A 12/14/2018    Performed by Erick Agustin MD at [facility]    CARDIOVERSION N/A 12/17/2018    Performed by Erick Agustin MD at [facility]    Cardioversion or Defibrillation N/A 7/8/2019    Performed by Erick [facility]ustin MD at [facility]    Cardioversion or Defibrillation  12/14/2018    Performed by Erick Agustin MD at [facility]    Transesophageal echo (JOS) intra-procedure log documentation N/A 12/13/2018    Performed by [facility] Diagnostic Provider at [facility]       Review of patient's allergies indicates:  No Known Allergies    No current facility-administered medications on file prior to encounter.      Current Outpatient Medications on File Prior to Encounter   Medication Sig    finasteride (PROPECIA) 1 mg tablet Take 1 mg by mouth once daily.    metronidazole 1% (METROGEL) 1 % Gel     rivaroxaban (XARELTO) 20 mg Tab Take 20 mg by mouth daily with dinner or evening meal.    metoprolol succinate (TOPROL-XL) 25 MG 24 hr tablet Take 25 mg by mouth 2 (two) times daily.     Family History     None        Tobacco Use    Smoking status: Never Smoker    Smokeless tobacco: Never Used   Substance and Sexual Activity    Alcohol use: Yes     Alcohol/week: 1.2 oz     Types: 2 Glasses of wine per week     Comment: occassional    Drug use: No    Sexual activity: Not on file     Review of Systems   All other systems reviewed and are negative.    Objective:     Vital Signs (Most Recent):  Temp: 97 °F (36.1 °C) (08/26/19 1136)  Pulse: 69 (08/26/19 1136)  Resp: 18 (08/26/19 1136)  BP: 127/69 (08/26/19 1137)  SpO2: 97 % (08/26/19 1136) Vital Signs (24h Range):  Temp:  [97 °F (36.1 °C)] 97 °F (36.1 °C)  Pulse:  [69] 69  Resp:  [18] 18  SpO2:  [97 %] 97 %  BP: (126-127)/(69-74) 127/69     Weight: 113.4 kg (250 lb)  Body mass index is 32.98 kg/m².    SpO2: 97 %  O2  Device (Oxygen Therapy): room air    No intake or output data in the 24 hours ending 08/26/19 1252    Lines/Drains/Airways     Peripheral Intravenous Line                 Peripheral IV - Single Lumen 08/26/19 1145 20 G Right Antecubital less than 1 day                Physical Exam   Constitutional: He is oriented to person, place, and time. He appears well-developed and well-nourished. No distress.   HENT:   Head: Normocephalic and atraumatic.   Neck: No JVD present.   Cardiovascular: Normal rate, regular rhythm, normal heart sounds and intact distal pulses. Exam reveals no gallop and no friction rub.   No murmur heard.  Pulmonary/Chest: Effort normal and breath sounds normal. No respiratory distress. He has no wheezes. He has no rales.   Abdominal: Soft. Bowel sounds are normal. He exhibits no distension. There is no tenderness.   Musculoskeletal: He exhibits no edema.   Neurological: He is alert and oriented to person, place, and time.   Skin: He is not diaphoretic.       Significant Labs:     Outside labs reviewed; CBC, CMP, INR    WBC 4, Hgb 14.6, Hct 42.7, Plt 164    Significant Imaging:     I have reviewed all pertinent imaging studies    Assessment and Plan:     Atypical atrial flutter  1. JOS for evaluation of AFib  -No absolute contraindications of esophageal stricture, tumor, perforation, laceration,or diverticulum and/or active GI bleed  -The risks, benefits & alternatives of the procedure were explained to the patient.   -The risks of transesophageal echo include but are not limited to:  Dental trauma, esophageal trauma/perforation, bleeding, laryngospasm/brochospasm, aspiration, sore throat/hoarseness, & dislodgement of the endotracheal tube/nasogastric tube (where applicable).    -The risks of moderate sedation include hypotension, respiratory depression, arrhythmias, bronchospasm, & death.    -Informed consent was obtained. The patient is agreeable to proceed with the procedure and all questions and  concerns addressed.    Case discussed with an attending in echocardiography lab.     Further recommendations per attending addendum          VTE Risk Mitigation (From admission, onward)    None          Nate Guo MD  Cardiology   [facility]

## 2019-08-26 NOTE — ANESTHESIA PREPROCEDURE EVALUATION
08/26/2019  Juan Luis Guadalupe is a 54 y.o., male.  Patient Active Problem List   Diagnosis    Persistent atrial fibrillation    ASD (atrial septal defect)    Tongue lesion    Chronic anticoagulation    Obstructive sleep apnea syndrome    Atypical atrial flutter         Anesthesia Evaluation         Review of Systems      Physical Exam  General:  Well nourished    Airway/Jaw/Neck:  Airway Findings: Mouth Opening: Normal Tongue: Normal  General Airway Assessment: Adult  Mallampati: II  Improves to II with phonation.  TM Distance: Normal, at least 6 cm      Dental:  Dental Findings: In tact   Chest/Lungs:  Chest/Lungs Findings: Clear to auscultation     Heart/Vascular:  Heart Findings: Rate: Normal  Rhythm: Regular Rhythm  Sounds: Normal        Mental Status:  Mental Status Findings:  Cooperative, Alert and Oriented         Anesthesia Plan  Type of Anesthesia, risks & benefits discussed:  Anesthesia Type:  general  Patient's Preference: General  Intra-op Monitoring Plan: standard ASA monitors  Intra-op Monitoring Plan Comments: Standard ASA monitors.   Post Op Pain Control Plan: per primary service following discharge from PACU  Post Op Pain Control Plan Comments: Per primary service.     Induction:   IV  Beta Blocker:  Patient is not currently on a Beta-Blocker (No further documentation required).       Informed Consent: Patient understands risks and agrees with Anesthesia plan.  Questions answered. Anesthesia consent signed with patient.  ASA Score: 3     Day of Surgery Review of History & Physical:    H&P update referred to the surgeon.     Anesthesia Plan Notes: Chart reviewed, patient interviewed and examined.  The plan for general anesthesia was explained.  Questions were answered and the consent was signed.  Arina CABRERA         Ready For Surgery From Anesthesia Perspective.

## 2019-08-26 NOTE — NURSING
PT D/C'D TO HOME per md orders.  Vss.  piv removed intact and without difficulty.  Instructed pt and spouse on home medications, post procedure precautions and follow up visits.  Pt and family verbalizes understanding.  Pt in no acute distress and verbalizes no complaints.

## 2019-08-26 NOTE — HPI
54 year old male here for JOS in preparation for PVI tomorrow, he has a history of AF, ASD and CHRISTIANO. He reports feeling somewhat fatigued, but denies symptoms of HF or angina.    Dysphagia or odynophagia:  No  Liver Disease, esophageal disease, or known varices:  No  Upper GI Bleeding: No  Snoring:  Yes  Sleep Apnea:  Yes  Prior neck surgery or radiation:  No  History of anesthetic difficulties:  No  Family history of anesthetic difficulties:  No  Last oral intake:  12 hours ago  Able to move neck in all directions:  Yes        TTE 12/13/18    Conclusion     · Normal left ventricular systolic function. The estimated ejection fraction is 63% on this limited non-Doppler echo.  · No wall motion abnormalities.  · Severe left atrial enlargement.  · Indeterminate left ventricular diastolic function.  · Low normal right ventricular systolic function.  · Mild right ventricular enlargement.  · Moderate right atrial enlargement.  · Normal central venous pressure (3 mm Hg).  · Trivial pericardial effusion.      JOS 12/13/18    Conclusion     · Normal left ventricular systolic function. The estimated ejection fraction is 60%  · Normal right ventricular systolic function.  · Atrial enlargement.  · Mild mitral regurgitation.  · Trace tricuspid regurgitation.  · Interatrial septal defect present.  · Normal appearing left atrial appendage. No thrombus is present in the appendage. Normal appendage velocities.     5

## 2019-08-26 NOTE — SUBJECTIVE & OBJECTIVE
Past Medical History:   Diagnosis Date    Anticoagulant long-term use     ASD (atrial septal defect)     Atrial fibrillation        Past Surgical History:   Procedure Laterality Date    ABLATION, ARRHYTHMOGENIC FOCUS, FOR ATRIAL FIBRILLATION N/A 12/14/2018    Performed by Erick Agustin MD at [facility]    CARDIOVERSION N/A 12/17/2018    Performed by Erick [facility]ustin MD at [facility]    Cardioversion or Defibrillation N/A 7/8/2019    Performed by Erick Agustin MD at [facility]    Cardioversion or Defibrillation  12/14/2018    Performed by Erick Agustin MD at [facility]    Transesophageal echo (JOS) intra-procedure log documentation N/A 12/13/2018    Performed by [facility] Diagnostic Provider at [facility] EP LAB       Review of patient's allergies indicates:  No Known Allergies    No current facility-administered medications on file prior to encounter.      Current Outpatient Medications on File Prior to Encounter   Medication Sig    finasteride (PROPECIA) 1 mg tablet Take 1 mg by mouth once daily.    metronidazole 1% (METROGEL) 1 % Gel     rivaroxaban (XARELTO) 20 mg Tab Take 20 mg by mouth daily with dinner or evening meal.    metoprolol succinate (TOPROL-XL) 25 MG 24 hr tablet Take 25 mg by mouth 2 (two) times daily.     Family History     None        Tobacco Use    Smoking status: Never Smoker    Smokeless tobacco: Never Used   Substance and Sexual Activity    Alcohol use: Yes     Alcohol/week: 1.2 oz     Types: 2 Glasses of wine per week     Comment: occassional    Drug use: No    Sexual activity: Not on file     Review of Systems   All other systems reviewed and are negative.    Objective:     Vital Signs (Most Recent):  Temp: 97 °F (36.1 °C) (08/26/19 1136)  Pulse: 69 (08/26/19 1136)  Resp: 18 (08/26/19 1136)  BP: 127/69 (08/26/19 1137)  SpO2: 97 % (08/26/19 1136) Vital Signs (24h Range):  Temp:  [97 °F (36.1 °C)] 97 °F (36.1 °C)  Pulse:  [69] 69  Resp:  [18] 18  SpO2:  [97 %] 97 %  BP:  (126-127)/(69-74) 127/69     Weight: 113.4 kg (250 lb)  Body mass index is 32.98 kg/m².    SpO2: 97 %  O2 Device (Oxygen Therapy): room air    No intake or output data in the 24 hours ending 08/26/19 1252    Lines/Drains/Airways     Peripheral Intravenous Line                 Peripheral IV - Single Lumen 08/26/19 1145 20 G Right Antecubital less than 1 day                Physical Exam   Constitutional: He is oriented to person, place, and time. He appears well-developed and well-nourished. No distress.   HENT:   Head: Normocephalic and atraumatic.   Neck: No JVD present.   Cardiovascular: Normal rate, regular rhythm, normal heart sounds and intact distal pulses. Exam reveals no gallop and no friction rub.   No murmur heard.  Pulmonary/Chest: Effort normal and breath sounds normal. No respiratory distress. He has no wheezes. He has no rales.   Abdominal: Soft. Bowel sounds are normal. He exhibits no distension. There is no tenderness.   Musculoskeletal: He exhibits no edema.   Neurological: He is alert and oriented to person, place, and time.   Skin: He is not diaphoretic.       Significant Labs:     Outside labs reviewed from 7/23/19; CBC, CMP, INR    Significant Imaging:     I have reviewed all pertinent imaging studies

## 2019-08-26 NOTE — PLAN OF CARE
Problem: Adult Inpatient Plan of Care  Goal: Plan of Care Review  Outcome: Ongoing (interventions implemented as appropriate)  Pt arrived to floor ambulatory from home accompanied by his wife. Pt oriented to room. Iv inserted. Admit assessment completed. Nurse call bell within reach. Will continue to monitor

## 2019-08-26 NOTE — ASSESSMENT & PLAN NOTE
1. JOS for evaluation of AFib  -No absolute contraindications of esophageal stricture, tumor, perforation, laceration,or diverticulum and/or active GI bleed  -The risks, benefits & alternatives of the procedure were explained to the patient.   -The risks of transesophageal echo include but are not limited to:  Dental trauma, esophageal trauma/perforation, bleeding, laryngospasm/brochospasm, aspiration, sore throat/hoarseness, & dislodgement of the endotracheal tube/nasogastric tube (where applicable).    -The risks of moderate sedation include hypotension, respiratory depression, arrhythmias, bronchospasm, & death.    -Informed consent was obtained. The patient is agreeable to proceed with the procedure and all questions and concerns addressed.    Case discussed with an attending in echocardiography lab.     Further recommendations per attending addendum

## 2019-08-26 NOTE — TRANSFER OF CARE
"Anesthesia Transfer of Care Note    Patient: Juan Luis Guadalupe    Procedure(s) Performed: Procedure(s) (LRB):  Transesophageal echo (JOS) intra-procedure log documentation (N/A)    Patient location: PACU    Anesthesia Type: general    Transport from OR: Transported from OR on 2-3 L/min O2 by NC with adequate spontaneous ventilation    Post pain: adequate analgesia    Post assessment: no apparent anesthetic complications and tolerated procedure well    Post vital signs: stable    Level of consciousness: sedated    Nausea/Vomiting: no nausea/vomiting    Complications: none    Transfer of care protocol was followed      Last vitals:   Visit Vitals  /65 (BP Location: Left arm, Patient Position: Lying)   Pulse 54   Temp 36.1 °C (97 °F) (Oral)   Resp 16   Ht 6' 1" (1.854 m)   Wt 113.4 kg (250 lb)   SpO2 97%   BMI 32.98 kg/m²     "

## 2019-08-26 NOTE — PLAN OF CARE
Problem: Adult Inpatient Plan of Care  Goal: Plan of Care Review  Outcome: Ongoing (interventions implemented as appropriate)  Pt transferred from recovery via stretcher.  Vss.  Pt aao, tolerating po.  Post op orders and assessment initiated.   Pt in no acute distress and verbalizes no complaints.  Will continue to monitor.  Call bell within reach.

## 2019-08-27 ENCOUNTER — ANESTHESIA (OUTPATIENT)
Dept: MEDSURG UNIT | Facility: HOSPITAL | Age: 54
End: 2019-08-27
Payer: COMMERCIAL

## 2019-08-27 ENCOUNTER — HOSPITAL ENCOUNTER (OUTPATIENT)
Facility: HOSPITAL | Age: 54
Discharge: HOME | End: 2019-08-28
Attending: INTERNAL MEDICINE | Admitting: INTERNAL MEDICINE
Payer: COMMERCIAL

## 2019-08-27 DIAGNOSIS — I48.19 PERSISTENT ATRIAL FIBRILLATION: Primary | ICD-10-CM

## 2019-08-27 DIAGNOSIS — I48.4 ATYPICAL ATRIAL FLUTTER: ICD-10-CM

## 2019-08-27 DIAGNOSIS — I48.91 ATRIAL FIBRILLATION: ICD-10-CM

## 2019-08-27 LAB
ABO + RH BLD: NORMAL
BLD GP AB SCN CELLS X3 SERPL QL: NORMAL

## 2019-08-27 PROCEDURE — 63600175 PHARM REV CODE 636 W HCPCS: Performed by: INTERNAL MEDICINE

## 2019-08-27 PROCEDURE — 37000008 HC ANESTHESIA 1ST 15 MINUTES: Performed by: INTERNAL MEDICINE

## 2019-08-27 PROCEDURE — 93662 PR INTRACARD ECHO, THER/DX INTERVENT: ICD-10-PCS | Mod: 26,,, | Performed by: INTERNAL MEDICINE

## 2019-08-27 PROCEDURE — 63600175 PHARM REV CODE 636 W HCPCS: Performed by: NURSE PRACTITIONER

## 2019-08-27 PROCEDURE — 93010 ELECTROCARDIOGRAM REPORT: CPT | Mod: ,,, | Performed by: INTERNAL MEDICINE

## 2019-08-27 PROCEDURE — 93655 ICAR CATH ABLTJ DSCRT ARRHYT: CPT | Performed by: INTERNAL MEDICINE

## 2019-08-27 PROCEDURE — 36620 INSERTION CATHETER ARTERY: CPT | Mod: 59,,, | Performed by: ANESTHESIOLOGY

## 2019-08-27 PROCEDURE — 93656 PR ELECTROPHYS EVAL, COMPREHEN, W/ABLATION OF ATRIAL FIBR: ICD-10-PCS | Mod: ,,, | Performed by: INTERNAL MEDICINE

## 2019-08-27 PROCEDURE — C1766 INTRO/SHEATH,STRBLE,NON-PEEL: HCPCS | Performed by: INTERNAL MEDICINE

## 2019-08-27 PROCEDURE — 37000009 HC ANESTHESIA EA ADD 15 MINS: Performed by: INTERNAL MEDICINE

## 2019-08-27 PROCEDURE — 25000003 PHARM REV CODE 250: Performed by: NURSE PRACTITIONER

## 2019-08-27 PROCEDURE — 27201423 OPTIME MED/SURG SUP & DEVICES STERILE SUPPLY: Performed by: INTERNAL MEDICINE

## 2019-08-27 PROCEDURE — D9220A PRA ANESTHESIA: Mod: ,,, | Performed by: ANESTHESIOLOGY

## 2019-08-27 PROCEDURE — 93613 INTRACARDIAC EPHYS 3D MAPG: CPT | Mod: ,,, | Performed by: INTERNAL MEDICINE

## 2019-08-27 PROCEDURE — 93613 INTRACARDIAC EPHYS 3D MAPG: CPT | Performed by: INTERNAL MEDICINE

## 2019-08-27 PROCEDURE — 25000003 PHARM REV CODE 250: Performed by: INTERNAL MEDICINE

## 2019-08-27 PROCEDURE — 93655 PR ABLATE ARRHYTHMIA ADD ON: ICD-10-PCS | Mod: ,,, | Performed by: INTERNAL MEDICINE

## 2019-08-27 PROCEDURE — 93010 EKG 12-LEAD: ICD-10-PCS | Mod: ,,, | Performed by: INTERNAL MEDICINE

## 2019-08-27 PROCEDURE — 86901 BLOOD TYPING SEROLOGIC RH(D): CPT

## 2019-08-27 PROCEDURE — D9220A PRA ANESTHESIA: ICD-10-PCS | Mod: ,,, | Performed by: ANESTHESIOLOGY

## 2019-08-27 PROCEDURE — 93010 ELECTROCARDIOGRAM REPORT: CPT | Mod: 76,,, | Performed by: INTERNAL MEDICINE

## 2019-08-27 PROCEDURE — 25000003 PHARM REV CODE 250: Performed by: NURSE ANESTHETIST, CERTIFIED REGISTERED

## 2019-08-27 PROCEDURE — 93662 INTRACARDIAC ECG (ICE): CPT | Mod: 26,,, | Performed by: INTERNAL MEDICINE

## 2019-08-27 PROCEDURE — 93656 COMPRE EP EVAL ABLTJ ATR FIB: CPT | Performed by: INTERNAL MEDICINE

## 2019-08-27 PROCEDURE — 36620 PR INSERT CATH,ART,PERCUT,SHORTTERM: ICD-10-PCS | Mod: 59,,, | Performed by: ANESTHESIOLOGY

## 2019-08-27 PROCEDURE — C1753 CATH, INTRAVAS ULTRASOUND: HCPCS | Performed by: INTERNAL MEDICINE

## 2019-08-27 PROCEDURE — 93005 ELECTROCARDIOGRAM TRACING: CPT

## 2019-08-27 PROCEDURE — 63600175 PHARM REV CODE 636 W HCPCS: Performed by: ANESTHESIOLOGY

## 2019-08-27 PROCEDURE — 63600175 PHARM REV CODE 636 W HCPCS: Performed by: NURSE ANESTHETIST, CERTIFIED REGISTERED

## 2019-08-27 PROCEDURE — 93662 INTRACARDIAC ECG (ICE): CPT | Performed by: INTERNAL MEDICINE

## 2019-08-27 PROCEDURE — C1730 CATH, EP, 19 OR FEW ELECT: HCPCS | Performed by: INTERNAL MEDICINE

## 2019-08-27 PROCEDURE — C2630 CATH, EP, COOL-TIP: HCPCS | Performed by: INTERNAL MEDICINE

## 2019-08-27 PROCEDURE — C1894 INTRO/SHEATH, NON-LASER: HCPCS | Performed by: INTERNAL MEDICINE

## 2019-08-27 PROCEDURE — 93655 ICAR CATH ABLTJ DSCRT ARRHYT: CPT | Mod: ,,, | Performed by: INTERNAL MEDICINE

## 2019-08-27 PROCEDURE — 27201037 HC PRESSURE MONITORING SET UP

## 2019-08-27 PROCEDURE — 93613 PR INTRACARD ELECTROPHYS 3-DIMENS MAPPING: ICD-10-PCS | Mod: ,,, | Performed by: INTERNAL MEDICINE

## 2019-08-27 PROCEDURE — 93656 COMPRE EP EVAL ABLTJ ATR FIB: CPT | Mod: ,,, | Performed by: INTERNAL MEDICINE

## 2019-08-27 RX ORDER — FENTANYL CITRATE 50 UG/ML
25 INJECTION, SOLUTION INTRAMUSCULAR; INTRAVENOUS EVERY 5 MIN PRN
Status: DISCONTINUED | OUTPATIENT
Start: 2019-08-27 | End: 2019-08-28 | Stop reason: HOSPADM

## 2019-08-27 RX ORDER — SODIUM CHLORIDE 9 MG/ML
INJECTION, SOLUTION INTRAVENOUS CONTINUOUS
Status: DISCONTINUED | OUTPATIENT
Start: 2019-08-27 | End: 2019-08-27

## 2019-08-27 RX ORDER — HEPARIN SODIUM 1000 [USP'U]/ML
INJECTION, SOLUTION INTRAVENOUS; SUBCUTANEOUS
Status: DISCONTINUED | OUTPATIENT
Start: 2019-08-27 | End: 2019-08-27

## 2019-08-27 RX ORDER — GLYCOPYRROLATE 0.2 MG/ML
INJECTION INTRAMUSCULAR; INTRAVENOUS
Status: DISCONTINUED | OUTPATIENT
Start: 2019-08-27 | End: 2019-08-27

## 2019-08-27 RX ORDER — LIDOCAINE HYDROCHLORIDE 20 MG/ML
INJECTION, SOLUTION INFILTRATION; PERINEURAL
Status: DISCONTINUED | OUTPATIENT
Start: 2019-08-27 | End: 2019-08-27

## 2019-08-27 RX ORDER — ACETAMINOPHEN 500 MG
1000 TABLET ORAL ONCE
Status: COMPLETED | OUTPATIENT
Start: 2019-08-27 | End: 2019-08-27

## 2019-08-27 RX ORDER — SODIUM CHLORIDE 0.9 % (FLUSH) 0.9 %
3 SYRINGE (ML) INJECTION
Status: DISCONTINUED | OUTPATIENT
Start: 2019-08-27 | End: 2019-08-28 | Stop reason: HOSPADM

## 2019-08-27 RX ORDER — HEPARIN SODIUM 10000 [USP'U]/100ML
INJECTION, SOLUTION INTRAVENOUS CONTINUOUS PRN
Status: DISCONTINUED | OUTPATIENT
Start: 2019-08-27 | End: 2019-08-27

## 2019-08-27 RX ORDER — FENTANYL CITRATE 50 UG/ML
INJECTION, SOLUTION INTRAMUSCULAR; INTRAVENOUS
Status: DISCONTINUED | OUTPATIENT
Start: 2019-08-27 | End: 2019-08-27

## 2019-08-27 RX ORDER — ROCURONIUM BROMIDE 10 MG/ML
INJECTION, SOLUTION INTRAVENOUS
Status: DISCONTINUED | OUTPATIENT
Start: 2019-08-27 | End: 2019-08-27

## 2019-08-27 RX ORDER — FUROSEMIDE 10 MG/ML
20 INJECTION INTRAMUSCULAR; INTRAVENOUS ONCE
Status: COMPLETED | OUTPATIENT
Start: 2019-08-27 | End: 2019-08-27

## 2019-08-27 RX ORDER — PROPAFENONE HYDROCHLORIDE 325 MG/1
325 CAPSULE, EXTENDED RELEASE ORAL EVERY 12 HOURS
Status: ON HOLD | COMMUNITY
End: 2019-08-28 | Stop reason: HOSPADM

## 2019-08-27 RX ORDER — ACETAMINOPHEN 325 MG/1
650 TABLET ORAL EVERY 6 HOURS PRN
Status: DISCONTINUED | OUTPATIENT
Start: 2019-08-27 | End: 2019-08-28 | Stop reason: HOSPADM

## 2019-08-27 RX ORDER — NEOSTIGMINE METHYLSULFATE 1 MG/ML
INJECTION, SOLUTION INTRAVENOUS
Status: DISCONTINUED | OUTPATIENT
Start: 2019-08-27 | End: 2019-08-27

## 2019-08-27 RX ORDER — PROPOFOL 10 MG/ML
VIAL (ML) INTRAVENOUS
Status: DISCONTINUED | OUTPATIENT
Start: 2019-08-27 | End: 2019-08-27

## 2019-08-27 RX ORDER — SUCCINYLCHOLINE CHLORIDE 20 MG/ML
INJECTION INTRAMUSCULAR; INTRAVENOUS
Status: DISCONTINUED | OUTPATIENT
Start: 2019-08-27 | End: 2019-08-27

## 2019-08-27 RX ORDER — PROTAMINE SULFATE 10 MG/ML
INJECTION, SOLUTION INTRAVENOUS
Status: DISCONTINUED | OUTPATIENT
Start: 2019-08-27 | End: 2019-08-27

## 2019-08-27 RX ORDER — MEPERIDINE HYDROCHLORIDE 50 MG/ML
12.5 INJECTION INTRAMUSCULAR; INTRAVENOUS; SUBCUTANEOUS EVERY 10 MIN PRN
Status: ACTIVE | OUTPATIENT
Start: 2019-08-27 | End: 2019-08-27

## 2019-08-27 RX ORDER — PHENYLEPHRINE HYDROCHLORIDE 10 MG/ML
INJECTION INTRAVENOUS
Status: DISCONTINUED | OUTPATIENT
Start: 2019-08-27 | End: 2019-08-27

## 2019-08-27 RX ORDER — MIDAZOLAM HYDROCHLORIDE 1 MG/ML
INJECTION, SOLUTION INTRAMUSCULAR; INTRAVENOUS
Status: DISCONTINUED | OUTPATIENT
Start: 2019-08-27 | End: 2019-08-27

## 2019-08-27 RX ORDER — PANTOPRAZOLE SODIUM 40 MG/1
40 TABLET, DELAYED RELEASE ORAL DAILY
Status: DISCONTINUED | OUTPATIENT
Start: 2019-08-27 | End: 2019-08-28 | Stop reason: HOSPADM

## 2019-08-27 RX ORDER — HYDROMORPHONE HYDROCHLORIDE 1 MG/ML
0.2 INJECTION, SOLUTION INTRAMUSCULAR; INTRAVENOUS; SUBCUTANEOUS EVERY 5 MIN PRN
Status: DISCONTINUED | OUTPATIENT
Start: 2019-08-27 | End: 2019-08-28 | Stop reason: HOSPADM

## 2019-08-27 RX ADMIN — FUROSEMIDE 20 MG: 10 INJECTION, SOLUTION INTRAMUSCULAR; INTRAVENOUS at 11:08

## 2019-08-27 RX ADMIN — PROPOFOL 50 MG: 10 INJECTION, EMULSION INTRAVENOUS at 08:08

## 2019-08-27 RX ADMIN — PHENYLEPHRINE HYDROCHLORIDE 100 MCG: 10 INJECTION INTRAVENOUS at 09:08

## 2019-08-27 RX ADMIN — PROPOFOL 50 MG: 10 INJECTION, EMULSION INTRAVENOUS at 07:08

## 2019-08-27 RX ADMIN — ROCURONIUM BROMIDE 25 MG: 10 INJECTION, SOLUTION INTRAVENOUS at 08:08

## 2019-08-27 RX ADMIN — ACETAMINOPHEN 1000 MG: 500 TABLET ORAL at 01:08

## 2019-08-27 RX ADMIN — PHENYLEPHRINE HYDROCHLORIDE 200 MCG: 10 INJECTION INTRAVENOUS at 09:08

## 2019-08-27 RX ADMIN — HEPARIN SODIUM 5000 UNITS: 1000 INJECTION INTRAVENOUS; SUBCUTANEOUS at 08:08

## 2019-08-27 RX ADMIN — ROCURONIUM BROMIDE 20 MG: 10 INJECTION, SOLUTION INTRAVENOUS at 10:08

## 2019-08-27 RX ADMIN — PROPOFOL 150 MG: 10 INJECTION, EMULSION INTRAVENOUS at 07:08

## 2019-08-27 RX ADMIN — ROCURONIUM BROMIDE 10 MG: 10 INJECTION, SOLUTION INTRAVENOUS at 08:08

## 2019-08-27 RX ADMIN — MIDAZOLAM HYDROCHLORIDE 2 MG: 1 INJECTION, SOLUTION INTRAMUSCULAR; INTRAVENOUS at 07:08

## 2019-08-27 RX ADMIN — SUCCINYLCHOLINE CHLORIDE 120 MG: 20 INJECTION, SOLUTION INTRAMUSCULAR; INTRAVENOUS at 07:08

## 2019-08-27 RX ADMIN — RIVAROXABAN 20 MG: 20 TABLET, FILM COATED ORAL at 05:08

## 2019-08-27 RX ADMIN — PHENYLEPHRINE HYDROCHLORIDE 0.5 MCG/KG/MIN: 10 INJECTION INTRAVENOUS at 09:08

## 2019-08-27 RX ADMIN — NEOSTIGMINE METHYLSULFATE 5 MG: 1 INJECTION INTRAVENOUS at 10:08

## 2019-08-27 RX ADMIN — FENTANYL CITRATE 100 MCG: 50 INJECTION, SOLUTION INTRAMUSCULAR; INTRAVENOUS at 07:08

## 2019-08-27 RX ADMIN — FENTANYL CITRATE 25 MCG: 50 INJECTION INTRAMUSCULAR; INTRAVENOUS at 11:08

## 2019-08-27 RX ADMIN — ROCURONIUM BROMIDE 5 MG: 10 INJECTION, SOLUTION INTRAVENOUS at 07:08

## 2019-08-27 RX ADMIN — PHENYLEPHRINE HYDROCHLORIDE 100 MCG: 10 INJECTION INTRAVENOUS at 08:08

## 2019-08-27 RX ADMIN — HEPARIN SODIUM 4000 UNITS: 1000 INJECTION INTRAVENOUS; SUBCUTANEOUS at 10:08

## 2019-08-27 RX ADMIN — HEPARIN SODIUM AND DEXTROSE 5000 UNITS/HR: 10000; 5 INJECTION INTRAVENOUS at 08:08

## 2019-08-27 RX ADMIN — ACETAMINOPHEN 650 MG: 325 TABLET ORAL at 11:08

## 2019-08-27 RX ADMIN — PROTAMINE SULFATE 5 MG: 10 INJECTION, SOLUTION INTRAVENOUS at 10:08

## 2019-08-27 RX ADMIN — ROCURONIUM BROMIDE 20 MG: 10 INJECTION, SOLUTION INTRAVENOUS at 09:08

## 2019-08-27 RX ADMIN — HEPARIN SODIUM 12000 UNITS: 1000 INJECTION INTRAVENOUS; SUBCUTANEOUS at 08:08

## 2019-08-27 RX ADMIN — GLYCOPYRROLATE 0.6 MG: 0.2 INJECTION, SOLUTION INTRAMUSCULAR; INTRAVENOUS at 10:08

## 2019-08-27 RX ADMIN — SODIUM CHLORIDE: 0.9 INJECTION, SOLUTION INTRAVENOUS at 07:08

## 2019-08-27 RX ADMIN — PANTOPRAZOLE SODIUM 40 MG: 40 TABLET, DELAYED RELEASE ORAL at 05:08

## 2019-08-27 RX ADMIN — PROTAMINE SULFATE 80 MG: 10 INJECTION, SOLUTION INTRAVENOUS at 10:08

## 2019-08-27 NOTE — PLAN OF CARE
Ambulated on unit this morning with wife at side.  Denies pain or SOB.  Verbalized understanding of procedure.  Oriented to unit.  Call bell provided.  Will continue to monitor.

## 2019-08-27 NOTE — TRANSFER OF CARE
"Anesthesia Transfer of Care Note    Patient: Juan Luis Guadalupe    Procedure(s) Performed: Procedure(s) (LRB):  Ablation atrial fibrillation (N/A)  Ablation, Atrial Flutter, Atypical (N/A)    Patient location: Other: 3 cards    Anesthesia Type: general    Transport from OR: Transported from OR on 6-10 L/min O2 by face mask with adequate spontaneous ventilation    Post pain: adequate analgesia    Post assessment: no apparent anesthetic complications    Post vital signs: stable    Level of consciousness: awake    Nausea/Vomiting: no nausea/vomiting    Complications: none    Transfer of care protocol was followed      Last vitals:   Visit Vitals  /81 (BP Location: Right arm, Patient Position: Lying)   Pulse 73   Temp 36.8 °C (98.3 °F)   Resp 17   Ht 6' 1" (1.854 m)   Wt 113.4 kg (250 lb)   BMI 32.98 kg/m²     "

## 2019-08-27 NOTE — H&P
"[facility]  Cardiac Electrophysiology  History and Physical     Admission Date: 8/27/2019  Code Status: Prior   Attending Provider: Erick Agustin MD   Principal Problem:Persistent atrial fibrillation    Subjective:     Chief Complaint:  AF     HPI:      54  year old male with PMHx of AF, ASD , atrial fibrillation > initially presented PCP with fatigue, noted to be in atrial fibrillation 10/18.  Echo 9/20/18 normal biventricular structure and function mildly dilated RA and LA, sinus venosus ASD < 10 mm.  Placed on Xarelto.  DCCV >> was back in AF the next day,  Referred for evaluation of sleep apnea. Was told he had "mild" sleep apnea.  Underwent PVI 12/13/2018 and started on propafenone.  On 12/17/2018 he was back in AF and underwent successful cardioversion.     On EP clinic follow up with MD Agustin 06/2019 > Propafenone was stopped. And also xarelto.   However patient with recurrence hence on 7/8/19 underwent cardioversion >  Pt has resume propafenone, pt states stopped metoprolol due to bradycardia   ECG performed in local office reveals atypical atrial flutter.  He is symptomatic. Patient resumed on xarelto. Patient elected to proceed for planned PVI repeat and atrial flutter ablation.       Patient presented to short stay unit today for planned PVI, he denies any chest pains, rash fevers, chills, bleeding,. JOS was done yesterday that showed no BHUPENDRA thrombus.     I have personally reviewed the patient's EKG today, which shows NSR at 63 BPM, NO Hx TIA/CVA          JOS on 8/26/19   · Normal left ventricular systolic function. The estimated ejection fraction is 60%  · Mild right ventricular enlargement. Normal right ventricular systolic function.  · Severe left atrial enlargement.  · Moderate right atrial enlargement.  · Mild tricuspid regurgitation with gradient of 26mmHg.  · No LA or BHUPENDRA thrombus.  Secundum interatrial septal defect present (0.5 x 0.8cm) with left to right shunting indicated " by color flow Doppler. Normal pulmonary vein drainage into LA    Past Medical History:   Diagnosis Date    Anticoagulant long-term use     ASD (atrial septal defect)     Atrial fibrillation        Past Surgical History:   Procedure Laterality Date    ABLATION, ARRHYTHMOGENIC FOCUS, FOR ATRIAL FIBRILLATION N/A 12/14/2018    Performed by E[facility] MD at [facility] EP [facility]    CARDIOVERSION N/A 12/17/2018    Performed by Erick [facility]ustin MD at [facility] EP [facility]    Cardioversion or Defibrillation N/A 7/8/2019    Performed by Erick [facility]ustin MD at [facility] EP [facility]    Cardioversion or Defibrillation  12/14/2018    Performed by Erick Agustin MD at [facility] EP LAB    Transesophageal echo (JOS) intra-procedure log documentation N/A 12/13/2018    Performed by [facility] Diagnostic Provider at [facility] EP LAB       Review of patient's allergies indicates:  No Known Allergies    No current facility-administered medications on file prior to encounter.      Current Outpatient Medications on File Prior to Encounter   Medication Sig    finasteride (PROPECIA) 1 mg tablet Take 1 mg by mouth once daily.    metronidazole 1% (METROGEL) 1 % Gel     rivaroxaban (XARELTO) 20 mg Tab Take 20 mg by mouth daily with dinner or evening meal.    metoprolol succinate (TOPROL-XL) 25 MG 24 hr tablet Take 25 mg by mouth 2 (two) times daily.     Family History     None        Tobacco Use    Smoking status: Never Smoker    Smokeless tobacco: Never Used   Substance and Sexual Activity    Alcohol use: Yes     Alcohol/week: 1.2 oz     Types: 2 Glasses of wine per week     Comment: occassional    Drug use: No    Sexual activity: Not on file     ROS       Review of Systems   Constitution: Negative for fevers/chills.   Positive for easily gets    weak and has malaise/fatigue in AF .   HENT: Negative for ear pain and tinnitus.   Eyes: Negative for blurred vision.   Cardiovascular: Positive for palpitations in AF . Negative for chest pain,  near-syncope, and syncope.    Respiratory:  Positive for shortness of breath on exertion in AF  Endocrine:  Negative for polyuria.   Hematologic/Lymphatic: Negative for bruise/bleed easily.   Skin:  Negative for rash.   Musculoskeletal: Negative for joint pain and muscle weakness.   Extremity: Negative for swelling in the lower extremities.   Gastrointestinal:  Negative for abdominal pain and change in bowel habit.   Genitourinary: Negative for frequency.   Neurological:  Negative for dizziness.   Psychiatric/Behavioral:  Negative for depression, anxiety     Objective:     Vital Signs (Most Recent):  Temp: 98.3 °F (36.8 °C) (08/27/19 0600)  Pulse: 73 (08/27/19 0600)  Resp: 17 (08/27/19 0600)  BP: 138/81 (08/27/19 0626) Vital Signs (24h Range):  Temp:  [97 °F (36.1 °C)-98.3 °F (36.8 °C)] 98.3 °F (36.8 °C)  Pulse:  [52-73] 73  Resp:  [14-18] 17  SpO2:  [97 %] 97 %  BP: (114-138)/(65-81) 138/81       Weight: 113.4 kg (250 lb)  Body mass index is 32.98 kg/m².            Physical Exam     PE:   General: Well developed, well nourished, No distress on room air   HEENT: Head is normocephalic, atraumatic;  Lungs: Clear to auscultation bilaterally.  No wheezing. Normal respiratory effort.  Cardiovascular:  S1 S2 Normal rate, regular rhythm and normal heart sounds.    PMI is not displaced  Extremities: No LE edema. Pulses 2+ and symmetric.   Abdomen: Abdomen is soft, non-tender non-distended with normal bowel sounds.  Skin: Skin color, texture, turgor normal. No rashes.  Musculoskeletal: normal range of motion   Neurologic: Normal strength and tone. No focal numbness or weakness.   Psychiatric: normal mood and affect.  behavior is normal. Alert and oriented X 3.  Labs reviewed from [facility] on 8/22/19 > Labs in chart             Significant Imaging: chart reviewed     Assessment and Plan:     * Persistent atrial fibrillation   Repeat PVI + RFA for atrial flutter > last dose xarelto was last night, he was on propafenone BID > held  Last  dose on Friday 8/23/19     Anesthesia for sedation        Prior to procedure, extensive discussion with patient regarding risks and benefits of  ablation by MD Nikole Suarez , and patient  would like to proceed.  The patient/ spouse  voices understanding and all questions have been answered. No further questions/concerns voiced at this time.             Arash Byers NP  Cardiac Electrophysiology  [facility]    MD Erick Agustin

## 2019-08-27 NOTE — ANESTHESIA PREPROCEDURE EVALUATION
08/27/2019  Juan Luis Guadalupe is a 54 y.o., male     Procedures:       Ablation atrial fibrillation (N/A Chest) - AF, AFl (atypical), PVI (re-do), RFA, JILLIAN, Gen, SK, 3 Prep      Ablation, Atrial Flutter, Atypical (N/A )   Anesthesia type: General   Diagnosis:       Atypical atrial flutter [I48.4]      Persistent atrial fibrillation [I48.1]       Pre-operative evaluation for Procedure(s) (LRB):  Ablation atrial fibrillation (N/A)  Ablation, Atrial Flutter, Atypical (N/A)    Encounter Diagnoses   Name Primary?    Atypical atrial flutter     Persistent atrial fibrillation     Atrial fibrillation        Review of patient's allergies indicates:  No Known Allergies    No current facility-administered medications on file prior to encounter.      Current Outpatient Medications on File Prior to Encounter   Medication Sig Dispense Refill    finasteride (PROPECIA) 1 mg tablet Take 1 mg by mouth once daily.  3    metoprolol succinate (TOPROL-XL) 25 MG 24 hr tablet Take 25 mg by mouth 2 (two) times daily.      metronidazole 1% (METROGEL) 1 % Gel   5    rivaroxaban (XARELTO) 20 mg Tab Take 20 mg by mouth daily with dinner or evening meal.         Social History     Tobacco Use   Smoking Status Never Smoker   Smokeless Tobacco Never Used       Social History     Substance and Sexual Activity   Alcohol Use Yes    Alcohol/week: 1.2 oz    Types: 2 Glasses of wine per week    Comment: occassional       Patient Active Problem List   Diagnosis    Persistent atrial fibrillation    ASD (atrial septal defect)    Tongue lesion    Chronic anticoagulation    Obstructive sleep apnea syndrome    Atypical atrial flutter       Past Surgical History:   Procedure Laterality Date    ABLATION, ARRHYTHMOGENIC FOCUS, FOR ATRIAL FIBRILLATION N/A 12/14/2018    Performed [facility] at [facility] EP [facility]    CARDIOVERSION N/A  12/17/2018    Performed by Erick [facility]ustin MD at [facility] EP [facility]    Cardioversion or Defibrillation N/A 7/8/2019    Performed by Erick [facility]ustin MD at [facility] EP LAB    Cardioversion or Defibrillation  12/14/2018    Performed by Erick [facility]ustin MD at [facility] EP LAB    Transesophageal echo (JOS) intra-procedure log documentation N/A 12/13/2018    Performed by [facility] Diagnostic Provider at [facility] EP LAB           No results for input(s): HCT in the last 72 hours.  No results for input(s): PLT in the last 72 hours.  No results for input(s): K in the last 72 hours.  No results for input(s): CREATININE in the last 72 hours.  No results for input(s): GLU in the last 72 hours.  No results for input(s): PT in the last 72 hours.                  .    Anesthesia Evaluation         Review of Systems  Anesthesia Hx:  No problems with previous Anesthesia    Hematology/Oncology:     Oncology Normal   Hematology Comments: Last xarelto last night   Cardiovascular:   Denies Hypertension.  Denies MI.  Dysrhythmias   Denies Angina.    Pulmonary:   Denies COPD.  Denies Asthma.  Denies Shortness of breath. Sleep Apnea, CPAP    Hepatic/GI:   Denies Hiatal Hernia. Denies Liver Disease.    Neurological:   Denies TIA. Denies CVA. Denies Seizures.    Endocrine:   Denies Diabetes.        Physical Exam  General:  Well nourished    Airway/Jaw/Neck:  Airway Findings: Mouth Opening: Normal Tongue: Normal  General Airway Assessment: Adult, Average  Mallampati: II  TM Distance: Normal, at least 6 cm  Jaw/Neck Findings:  Neck ROM: Normal ROM            Mental Status:  Mental Status Findings:  Cooperative, Alert and Oriented         Anesthesia Plan  Type of Anesthesia, risks & benefits discussed:  Anesthesia Type:  general  Patient's Preference:   Intra-op Monitoring Plan:   Intra-op Monitoring Plan Comments:   Post Op Pain Control Plan:   Post Op Pain Control Plan Comments: As per surgeon's plan  Induction:   IV  Beta Blocker:         Informed Consent: Patient  understands risks and agrees with Anesthesia plan.  Questions answered. Anesthesia consent signed with patient.  ASA Score: 3     Day of Surgery Review of History & Physical:    H&P update referred to the surgeon.         Ready For Surgery From Anesthesia Perspective.

## 2019-08-27 NOTE — ANESTHESIA RELEASE NOTE
"Anesthesia Release from PACU Note    Patient: Juan Luis Guadalupe    Procedure(s) Performed: Procedure(s) (LRB):  Ablation atrial fibrillation (N/A)  Ablation, Atrial Flutter, Atypical (N/A)    Anesthesia type: GEN    Post pain: giving pain meds    Post assessment: no apparent anesthetic complications, tolerated procedure well and no evidence of recall    Post vital signs: /67 (BP Location: Left arm, Patient Position: Lying)   Pulse 64   Temp 36.6 °C (97.9 °F) (Temporal)   Resp 14   Ht 6' 1" (1.854 m)   Wt 113.4 kg (250 lb)   SpO2 97%   BMI 32.98 kg/m²     Level of consciousness: awake, alert and oriented    Nausea/Vomiting: no nausea/no vomiting    Complications: none    Airway Patency: patent    Respiratory: unassisted, spontaneous ventilation, room air    Cardiovascular: stable and blood pressure at baseline    Hydration: euvolemic    "

## 2019-08-27 NOTE — ASSESSMENT & PLAN NOTE
Repeat PVI + RFA for atrial flutter > last dose xarelto was last night   Anesthesia for sedation

## 2019-08-27 NOTE — NURSING
Pt transferred from recovery via stretcher.  Vss. silvana groin sites remains dayna.  Sutures intact.  0 bleed, 0 hematoma.  Post op orders and assessment initiated.  Pt aao, tolerating po.  Spouse called to bs.  Pt in no acute distress and verbalizes no complaints. Will continue to monitor.  Call bell within reach.

## 2019-08-27 NOTE — ANESTHESIA POSTPROCEDURE EVALUATION
Anesthesia Post Evaluation    Patient: Juan Luis Guadalupe    Procedure(s) Performed: Procedure(s) (LRB):  Ablation atrial fibrillation (N/A)  Ablation, Atrial Flutter, Atypical (N/A)    Final Anesthesia Type: general  Patient location during evaluation: PACU  Patient participation: Yes- Able to Participate  Level of consciousness: awake and alert and oriented  Post-procedure vital signs: reviewed and stable  Pain management: pain needs to be addressed ( giving pain meds)  Airway patency: patent  PONV status at discharge: No PONV  Anesthetic complications: no      Cardiovascular status: stable  Respiratory status: unassisted, spontaneous ventilation and room air  Hydration status: euvolemic  Follow-up not needed.          Vitals Value Taken Time   /79 8/27/2019 11:31 AM   Temp 36.6 °C (97.9 °F) 8/27/2019 11:00 AM   Pulse 61 8/27/2019 11:44 AM   Resp 9 8/27/2019 11:44 AM   SpO2 99 % 8/27/2019 11:44 AM   Vitals shown include unvalidated device data.      No case tracking events are documented in the log.      Pain/Kimberly Score: Pain Rating Prior to Med Admin: 6 (8/27/2019 11:44 AM)  Kimberly Score: 7 (8/27/2019 11:00 AM)

## 2019-08-27 NOTE — HOSPITAL COURSE
Patient underwent successful JOS, without complication. He was discharged home in stable condition.

## 2019-08-27 NOTE — SUBJECTIVE & OBJECTIVE
Past Medical History:   Diagnosis Date    Anticoagulant long-term use     ASD (atrial septal defect)     Atrial fibrillation        Past Surgical History:   Procedure Laterality Date    ABLATION, ARRHYTHMOGENIC FOCUS, FOR ATRIAL FIBRILLATION N/A 12/14/2018    Performed by Erick Agustin MD at [facility] EP LAB    CARDIOVERSION N/A 12/17/2018    Performed by Erick Agustin MD at [facility] EP LAB    Cardioversion or Defibrillation N/A 7/8/2019    Performed by Erick Agustin MD at [facility] EP LAB    Cardioversion or Defibrillation  12/14/2018    Performed by Erick Agustin MD at [facility] EP LAB    Transesophageal echo (JOS) intra-procedure log documentation N/A 12/13/2018    Performed by [facility] Diagnostic Provider at [facility] EP LAB       Review of patient's allergies indicates:  No Known Allergies    No current facility-administered medications on file prior to encounter.      Current Outpatient Medications on File Prior to Encounter   Medication Sig    finasteride (PROPECIA) 1 mg tablet Take 1 mg by mouth once daily.    metronidazole 1% (METROGEL) 1 % Gel     rivaroxaban (XARELTO) 20 mg Tab Take 20 mg by mouth daily with dinner or evening meal.    metoprolol succinate (TOPROL-XL) 25 MG 24 hr tablet Take 25 mg by mouth 2 (two) times daily.     Family History     None        Tobacco Use    Smoking status: Never Smoker    Smokeless tobacco: Never Used   Substance and Sexual Activity    Alcohol use: Yes     Alcohol/week: 1.2 oz     Types: 2 Glasses of wine per week     Comment: occassional    Drug use: No    Sexual activity: Not on file     ROS       Review of Systems   Constitution: Negative for fevers/chills.   Positive for easily gets    weak and has malaise/fatigue in AF .   HENT: Negative for ear pain and tinnitus.   Eyes: Negative for blurred vision.   Cardiovascular: Positive for palpitations in AF . Negative for chest pain,  near-syncope, and syncope.   Respiratory:  Positive for shortness of breath on exertion in  AF  Endocrine:  Negative for polyuria.   Hematologic/Lymphatic: Negative for bruise/bleed easily.   Skin:  Negative for rash.   Musculoskeletal: Negative for joint pain and muscle weakness.   Extremity: Negative for swelling in the lower extremities.   Gastrointestinal:  Negative for abdominal pain and change in bowel habit.   Genitourinary: Negative for frequency.   Neurological:  Negative for dizziness.   Psychiatric/Behavioral:  Negative for depression, anxiety     Objective:     Vital Signs (Most Recent):  Temp: 98.3 °F (36.8 °C) (08/27/19 0600)  Pulse: 73 (08/27/19 0600)  Resp: 17 (08/27/19 0600)  BP: 138/81 (08/27/19 0626) Vital Signs (24h Range):  Temp:  [97 °F (36.1 °C)-98.3 °F (36.8 °C)] 98.3 °F (36.8 °C)  Pulse:  [52-73] 73  Resp:  [14-18] 17  SpO2:  [97 %] 97 %  BP: (114-138)/(65-81) 138/81       Weight: 113.4 kg (250 lb)  Body mass index is 32.98 kg/m².            Physical Exam     PE:   General: Well developed, well nourished, No distress on room air   HEENT: Head is normocephalic, atraumatic;  Lungs: Clear to auscultation bilaterally.  No wheezing. Normal respiratory effort.  Cardiovascular:  S1 S2 Normal rate, regular rhythm and normal heart sounds.    PMI is not displaced  Extremities: No LE edema. Pulses 2+ and symmetric.   Abdomen: Abdomen is soft, non-tender non-distended with normal bowel sounds.  Skin: Skin color, texture, turgor normal. No rashes.  Musculoskeletal: normal range of motion   Neurologic: Normal strength and tone. No focal numbness or weakness.   Psychiatric: normal mood and affect.  behavior is normal. Alert and oriented X 3.  Labs reviewed from [facility] [facility] on 8/22/19 > Labs in chart             Significant Imaging: chart reviewed

## 2019-08-27 NOTE — DISCHARGE SUMMARY
[facility]  Cardiology  Discharge Summary      Patient Name: Juan Luis Guadalupe  MRN: [id number]  Admission Date: 8/26/2019  Hospital Length of Stay: 0 days  Discharge Date and Time:  08/27/2019 4:03 PM  Attending Physician: No att. providers found    Discharging Provider: Nate Guo MD  Primary Care Physician: Primary Doctor No    HPI:   54 year old male here for JOS in preparation for PVI tomorrow, he has a history of AF, ASD and CHRISTIANO. He reports feeling somewhat fatigued, but denies symptoms of HF or angina.    Dysphagia or odynophagia:  No  Liver Disease, esophageal disease, or known varices:  No  Upper GI Bleeding: No  Snoring:  Yes  Sleep Apnea:  Yes  Prior neck surgery or radiation:  No  History of anesthetic difficulties:  No  Family history of anesthetic difficulties:  No  Last oral intake:  12 hours ago  Able to move neck in all directions:  Yes        TTE 12/13/18    Conclusion     · Normal left ventricular systolic function. The estimated ejection fraction is 63% on this limited non-Doppler echo.  · No wall motion abnormalities.  · Severe left atrial enlargement.  · Indeterminate left ventricular diastolic function.  · Low normal right ventricular systolic function.  · Mild right ventricular enlargement.  · Moderate right atrial enlargement.  · Normal central venous pressure (3 mm Hg).  · Trivial pericardial effusion.      JOS 12/13/18    Conclusion     · Normal left ventricular systolic function. The estimated ejection fraction is 60%  · Normal right ventricular systolic function.  · Atrial enlargement.  · Mild mitral regurgitation.  · Trace tricuspid regurgitation.  · Interatrial septal defect present.  · Normal appearing left atrial appendage. No thrombus is present in the appendage. Normal appendage velocities.      Procedure(s) (LRB):  Transesophageal echo (JOS) intra-procedure log documentation (N/A)     Indwelling Lines/Drains at time of discharge:  Lines/Drains/Airways      Drain                 Urethral Catheter 08/27/19 0811 Straight-tip 16 Fr. less than 1 day                Hospital Course:  Patient underwent successful JOS, without complication. He was discharged home in stable condition.    Consults: None    Significant Diagnostic Studies: JOS    Pending Diagnostic Studies:     None          Final Active Diagnoses:    Diagnosis Date Noted POA    Atypical atrial flutter [I48.4] 07/08/2019 Yes      Problems Resolved During this Admission:     No new Assessment & Plan notes have been filed under this hospital service since the last note was generated.  Service: Cardiology      Discharged Condition: fair    Disposition: Home or Self Care    Follow Up: PVI tomorrow    Patient Instructions:   No discharge procedures on file.  Medications:  Reconciled Home Medications:      Medication List      CONTINUE taking these medications    finasteride 1 mg tablet  Commonly known as:  PROPECIA  Take 1 mg by mouth once daily.     metronidazole 1% 1 % Gel  Commonly known as:  METROGEL     rivaroxaban 20 mg Tab  Commonly known as:  XARELTO  Take 20 mg by mouth daily with dinner or evening meal.            Time spent on the discharge of patient: 15 minutes    Nate Guo MD  Cardiology  [facility]

## 2019-08-27 NOTE — PLAN OF CARE
Problem: Adult Inpatient Plan of Care  Goal: Plan of Care Review  Outcome: Ongoing (interventions implemented as appropriate)  Pt ambulated hallway without distress or discomfort.  vss  silvana groin sites remains dayna.  0 bleed, 0 hematoma.  Spouse at bedside.  Will continue to monitor.  Call bell within reach.

## 2019-08-27 NOTE — NURSING TRANSFER
Nursing Transfer Note      8/27/2019     Transfer To: ep pacu 3 to sscu 315     Transfer via stretcher    Transfer with cardiac monitoring, tele box on 315 sscu     Transported by [facility] cath lab pct    Medicines sent: none    Chart send with patient: Yes    Notified: spouse    Patient reassessed at: 8/27/19 1200. Next due 1230    Upon arrival to floor: cardiac monitor applied, patient oriented to room, call bell in reach and bed in lowest position

## 2019-08-27 NOTE — ANESTHESIA PROCEDURE NOTES
Arterial    Diagnosis: a fib blutter    Patient location during procedure: done in OR  Procedure start time: 8/27/2019 7:45 AM  Procedure end time: 8/27/2019 7:52 AM    Staffing  Authorizing Provider: Robert Elder Jr., MD  Performing Provider: Robert Elder Jr., MD    Anesthesiologist was present at the time of the procedure.    Preanesthetic Checklist  Completed: patient identified, site marked, surgical consent, pre-op evaluation, timeout performed, IV checked, risks and benefits discussed, monitors and equipment checked and anesthesia consent givenArterial  Skin Prep: chlorhexidine gluconate  Local Infiltration: none  Orientation: right  Location: radial  Catheter Size: 20 G  Catheter placement by Anatomical landmarks. Heme positive aspiration all ports.Insertion Attempts: 1  Assessment  Dressing: secured with tape and tegaderm  Patient: Tolerated well

## 2019-08-27 NOTE — Clinical Note
Manual pressure applied to the right femoral vein sheath insertion site. Bilateral groins sutured x 1

## 2019-08-28 VITALS
HEART RATE: 67 BPM | DIASTOLIC BLOOD PRESSURE: 63 MMHG | BODY MASS INDEX: 33.13 KG/M2 | TEMPERATURE: 99 F | HEIGHT: 73 IN | SYSTOLIC BLOOD PRESSURE: 115 MMHG | WEIGHT: 250 LBS | OXYGEN SATURATION: 92 % | RESPIRATION RATE: 20 BRPM

## 2019-08-28 PROCEDURE — 25000003 PHARM REV CODE 250: Performed by: NURSE PRACTITIONER

## 2019-08-28 RX ORDER — PANTOPRAZOLE SODIUM 40 MG/1
40 TABLET, DELAYED RELEASE ORAL DAILY
Qty: 30 TABLET | Refills: 0 | Status: SHIPPED | OUTPATIENT
Start: 2019-08-28 | End: 2019-11-13

## 2019-08-28 RX ADMIN — PANTOPRAZOLE SODIUM 40 MG: 40 TABLET, DELAYED RELEASE ORAL at 08:08

## 2019-08-28 NOTE — PLAN OF CARE
Problem: Adult Inpatient Plan of Care  Goal: Plan of Care Review  Outcome: Ongoing (interventions implemented as appropriate)  Pt AAO x3, NAD, s/p PVI on 8/27/19 per casey Ang groin sites, dsgs CDI, no active bleeding and no hematoma noted. Pt voids and ambulates without any difficulty. Call light within pt reach, pt instructed to call staff for any needed assistance, safety maintained. Pt denies needs/concerns at this time, will continue to monitor.

## 2019-08-28 NOTE — DISCHARGE SUMMARY
"[facility]  Cardiac Electrophysiology  Discharge Summary      Patient Name: Juan Luis Guadalupe  MRN: [id number]  Admission Date: 8/27/2019  Hospital Length of Stay: 0 days  Discharge Date and Time:  08/28/2019 8:43 AM  Attending Physician: Erick Agustin MD    Discharging Provider: Arash Byers NP  Primary Care Physician: Primary Doctor No    HPI: 54  year old male with PMHx of AF, ASD , atrial fibrillation > initially presented PCP with fatigue, noted to be in atrial fibrillation 10/18.  Echo 9/20/18 normal biventricular structure and function mildly dilated RA and LA, sinus venosus ASD < 10 mm.  Placed on Xarelto.  DCCV >> was back in AF the next day,  Referred for evaluation of sleep apnea. Was told he had "mild" sleep apnea.  Underwent PVI 12/13/2018 and started on propafenone.  On 12/17/2018 he was back in AF and underwent successful cardioversion.      On EP clinic follow up with MD Agustin 06/2019 > Propafenone was stopped. And also xarelto.   However patient with recurrence hence on 7/8/19 underwent cardioversion >  Pt has resume propafenone, pt states stopped metoprolol due to bradycardia   ECG performed in local office reveals atypical atrial flutter.  He is symptomatic. Patient resumed on xarelto. Patient elected to proceed for planned PVI repeat and atrial flutter ablation.      Patient presented to short stay unit  On 8/27/19  for planned PVI, he denies any chest pains, rash fevers, chills, bleeding,. JOS was done  8/26/19 that showed no BHUPENDRA thrombus.      I have personally reviewed the patient's EKG, which shows NSR at 63 BPM, NO Hx TIA/CVA      JOS on 8/26/19   · Normal left ventricular systolic function. The estimated ejection fraction is 60%  · Mild right ventricular enlargement. Normal right ventricular systolic function.  · Severe left atrial enlargement.  · Moderate right atrial enlargement.  · Mild tricuspid regurgitation with gradient of 26mmHg.  · No LA or BHUPENDRA " thrombus.  Secundum interatrial septal defect present (0.5 x 0.8cm) with left to right shunting indicated by color flow Doppler. Normal pulmonary vein drainage into LA      Procedure(s) (LRB):  Ablation atrial fibrillation (N/A)  Ablation, Atrial Flutter, Atypical (N/A)           Hospital Course: Pt underwent redo  PVI / Atrial flutter ablation ( see procedure note for details)  , tolerated procedure with no acute complications. Monitored overnight, Bilateral groins with no bleeding or hematoma > ambulating  with no issues .  Patient denies any acute  symptoms post procedure.   Pt  to continue on xarelto with dinner for CVA prophylaxis.   Pt will not be resumed on propafenone.    Pt to follow up with MD Erick Agustin in 8 weeks    Discharge plans/instructions discussed with patient   who verbalized understanding  and agreement of plans of care.  No further questions or concerns  voiced at this time     Physical Exam:   Gen: no acute distress, pleasant patient answering questions appropriately  CVS: regular rate and rhythm, S1S2 normal, no murmurs/rubs/gallops  CHEST: clear to auscultation bilaterally, no wheezes/rales/ronchi  ABD: Soft, non-tender, nondistended, bowel sounds present  GROINS: Bilateral  Soft, non tender, no bleeding no hematoma   Neurologic: Normal strength and tone. No focal numbness or weakness.   Psychiatric: normal mood and affect.  behavior is normal. Alert and oriented X 3.  EXT: No Edema     Consults: Anesthesia       Final Active Diagnoses:    Diagnosis Date Noted POA    PRINCIPAL PROBLEM:  Persistent atrial fibrillation [I48.1] 11/07/2018 Yes    Atypical atrial flutter [I48.4] 07/08/2019 Yes      Problems Resolved During this Admission:       Discharged Condition: good    Disposition: HOME    Follow Up:  Follow-up Information     Erick Agustin MD In 2 months.    Specialties:  Electrophysiology, Cardiology  Contact information:  [facility] [facility] [id number] [phone]                  Patient Instructions:      Diet Cardiac     Lifting restrictions   Order Comments: More than 5 pounds for 1 week     No driving until:   Order Comments: For 24 hours post procedure     Other restrictions (specify):   Order Comments:    1. Do not strain or lift anything greater than 5 lb for 1 week.   2. Do not drive or operate any dangerous machinery for 24 hours. .   3. After 24 hours, a shower is allowed.   4. Clean the area with mild soap and water.   5. Once the skin has healed (1 week), bathing in a tub, swimming, or hot tub is allowed.   6. Inspect the groin site daily and report to the physician any signs of infection at the site: redness, pain, fever >100.4, unusual pain at the access site or affected extremity, unusual swelling at the access site, or any yellow, white or green drainage.    Seek immediate medical attention   Bleeding from the puncture site that you cannot stop by doing the following:   Relax and lie down right away. Keep your leg flat and apply firm pressure to the site using your fingers and a gauze pad. Keep the pressure on for 10 minutes. Continue this until the bleeding stops. This may take awhile. When bleeding stops, cover the site with a sterile bandage and keep your leg still as much as possible.    Go to the Emergency Department if you develop:  -Severe bleeding  - Acute Weakness or numbness  -Visual, gait or speech disturbances  -New chest pain, palpitations, shortness of breath, fainting    MEDICATIONS:   Continue xarelto 20 mg once daily with dinner for stroke prevention     Notify your health care provider if you experience any of the following:  temperature >100.4     Notify your health care provider if you experience any of the following:  persistent nausea and vomiting or diarrhea     Notify your health care provider if you experience any of the following:  severe uncontrolled pain     Notify your health care provider if you experience any of the following:  redness,  tenderness, or signs of infection (pain, swelling, redness, odor or green/yellow discharge around incision site)     Notify your health care provider if you experience any of the following:  difficulty breathing or increased cough     Notify your health care provider if you experience any of the following:  severe persistent headache     Notify your health care provider if you experience any of the following:  worsening rash     Notify your health care provider if you experience any of the following:  persistent dizziness, light-headedness, or visual disturbances     Notify your health care provider if you experience any of the following:  increased confusion or weakness     Notify your health care provider if you experience any of the following:   Order Comments: For any concerning medical symptoms     Remove dressing in 24 hours   Order Comments: You may remove dressing this evening     Medications:  Reconciled Home Medications:      Medication List      START taking these medications    pantoprazole 40 MG tablet  Commonly known as:  PROTONIX  Take 1 tablet (40 mg total) by mouth once daily.        CONTINUE taking these medications    finasteride 1 mg tablet  Commonly known as:  PROPECIA  Take 1 mg by mouth once daily.     metronidazole 1% 1 % Gel  Commonly known as:  METROGEL     rivaroxaban 20 mg Tab  Commonly known as:  XARELTO  Take 20 mg by mouth daily with dinner or evening meal.        STOP taking these medications    propafenone 325 MG Cp12  Commonly known as:  RYTHMOL SR            Time spent on the discharge of patient: 20   minutes    Arash Byers NP  Cardiac Electrophysiology  [facility]    STAFF MD Erick Agustin

## 2019-08-29 LAB
POC ACTIVATED CLOTTING TIME K: 290 SEC (ref 74–137)
POC ACTIVATED CLOTTING TIME K: 318 SEC (ref 74–137)
POC ACTIVATED CLOTTING TIME K: 351 SEC (ref 74–137)
POC ACTIVATED CLOTTING TIME K: 384 SEC (ref 74–137)
SAMPLE: ABNORMAL

## 2019-09-11 ENCOUNTER — TELEPHONE (OUTPATIENT)
Dept: ELECTROPHYSIOLOGY | Facility: CLINIC | Age: 54
End: 2019-09-11

## 2019-09-11 NOTE — TELEPHONE ENCOUNTER
Called & scheduled Mr. Guadalupe' 2m f/u post rfa apt on 11/13 1:00 EKG & 1:30 Diana Matthews apt.  Mr. Guadalupe' confirmed apts.

## 2019-11-12 NOTE — PROGRESS NOTES
"Mr. Guadalupe is a patient of Dr. Agustin and was last seen in clinic 6/12/2019.      Subjective:   Patient ID:  Juan Luis Guadalupe is a 54 y.o. male who presents for follow-up of Atrial Fibrillation  .     HPI:    Mr. Guadalupe is a 54 y.o. male with AF, ASD, sleep apnea (on CPAP) here for follow up.     Background:    Primary cardiologist is Dr. Marie.  Presented to PCP with fatigue, noted to be in atrial fibrillation 10/18.  Has noted fatigue increasing in nature over the past year.  Also noted "something not feeling right in my chest."  Echo 9/20/18 normal biventricular structure and function mildly dilated RA and LA, sinus venosus ASD < 10 mm.  Placed on Xarelto.  DCCV >> was back in AF the next day  Referred for evaluation of sleep apnea. Was told he had "mild" sleep apnea.     Underwent PVI 12/13/2018 and started on propafenone.  On 12/17/2018 he was back in AF and underwent successful cardioversion.   Felt poorly on propafenone, which was discontinued 6/2019. Xarelto also discontinued.  7/2019, developed recurrence. Xarelto restarted.    Update (11/13/2019):    Underwent DCCV 7/8/2019. Rythmol resumed.    8/27/2019: Successful pulmonary vein RF ablation, ablation of atrial flutter (typical), posterior wall isolation. Propafenone discontinued.    Today he says he has been feeling great since his ablation. Mr. Guadalupe denies chest pain with exertion or at rest, palpitations, SOB, LUND, dizziness, or syncope. He is compliant with his CPAP.     He is currently taking xarelto 20mg daily for stroke prophylaxis and denies significant bleeding episodes.  Kidney function is stable, with a creatinine of 1.3 on 12/17/2018.    I have personally reviewed the patient's EKG today, which shows sinus rhythm at 76bpm. TX interval is 170. QRS is 86. QTc is 429.    Recent Cardiac Tests:    JOS (8/26/2019):  · Normal left ventricular systolic function. The estimated ejection fraction is 60%  · Mild right ventricular enlargement. Normal right " "ventricular systolic function.  · Severe left atrial enlargement.  · Moderate right atrial enlargement.  · Mild tricuspid regurgitation with gradient of 26mmHg.  · No LA or BHUPENDRA thrombus.  · Secundum interatrial septal defect present (0.5 x 0.8cm) with left to right shunting indicated by color flow Doppler. Normal pulmonary vein drainage into LA    Current Outpatient Medications   Medication Sig    finasteride (PROPECIA) 1 mg tablet Take 1 mg by mouth once daily.    rivaroxaban (XARELTO) 20 mg Tab Take 20 mg by mouth daily with dinner or evening meal.    metronidazole 1% (METROGEL) 1 % Gel      No current facility-administered medications for this visit.      Review of Systems   Constitution: Negative for malaise/fatigue.   Cardiovascular: Negative for chest pain, dyspnea on exertion, irregular heartbeat, leg swelling and palpitations.   Respiratory: Negative for shortness of breath.    Hematologic/Lymphatic: Negative for bleeding problem.   Skin: Negative for rash.   Musculoskeletal: Negative for myalgias.   Gastrointestinal: Negative for hematemesis, hematochezia and nausea.   Genitourinary: Negative for hematuria.   Neurological: Negative for light-headedness.   Psychiatric/Behavioral: Negative for altered mental status.   Allergic/Immunologic: Negative for persistent infections.     Objective:        /74   Pulse 76   Ht 6' 1" (1.854 m)   Wt 114.4 kg (252 lb 3.3 oz)   BMI 33.27 kg/m²     Physical Exam   Constitutional: He is oriented to person, place, and time. He appears well-developed and well-nourished.   HENT:   Head: Normocephalic.   Nose: Nose normal.   Eyes: Pupils are equal, round, and reactive to light.   Cardiovascular: Normal rate, regular rhythm, S1 normal and S2 normal.   No murmur heard.  Pulses:       Radial pulses are 2+ on the right side, and 2+ on the left side.   Pulmonary/Chest: Breath sounds normal. No respiratory distress.   Abdominal: Normal appearance.   Musculoskeletal: Normal " range of motion. He exhibits no edema.   Neurological: He is alert and oriented to person, place, and time.   Skin: Skin is warm and dry. No erythema.   Psychiatric: He has a normal mood and affect. His speech is normal and behavior is normal.   Nursing note and vitals reviewed.    Lab Results   Component Value Date     12/17/2018    K 4.3 12/17/2018    BUN 17 12/17/2018    CREATININE 1.3 12/17/2018    HGB 15.8 12/13/2018    HCT 46.2 12/13/2018       Recent Labs   Lab 12/13/18  1127   INR 1.1       Assessment     1. Atypical atrial flutter    2. Persistent atrial fibrillation    3. Chronic anticoagulation    4. Obstructive sleep apnea syndrome      Plan:     In summary, Mr. Guadalupe is a 54 y.o. male with AF, ASD, sleep apnea (on CPAP) here for follow up.   He is 10 weeks s/p redo PVI. In SR today per EKG. Off AAD since PVI. On xarelto.    Doing great 2.5 months s/p PVI. Off AAD.  F/u in 3 months.  If remains arrhythmia free in 3 months >> refer back to Interventional for consideration of PFO closure. Also consider d/c xarelto (CHADSVASc 0).    *A copy of this note has been sent to Dr. Agustin*    Follow up in about 3 months (around 2/13/2020).    ------------------------------------------------------------------    ATUL Arevalo, NP-C  Cardiac Electrophysiology

## 2019-11-13 ENCOUNTER — OFFICE VISIT (OUTPATIENT)
Dept: ELECTROPHYSIOLOGY | Facility: CLINIC | Age: 54
End: 2019-11-13
Payer: COMMERCIAL

## 2019-11-13 ENCOUNTER — HOSPITAL ENCOUNTER (OUTPATIENT)
Dept: CARDIOLOGY | Facility: CLINIC | Age: 54
Discharge: HOME | End: 2019-11-13
Payer: COMMERCIAL

## 2019-11-13 VITALS
WEIGHT: 252.19 LBS | HEIGHT: 73 IN | HEART RATE: 76 BPM | BODY MASS INDEX: 33.42 KG/M2 | SYSTOLIC BLOOD PRESSURE: 122 MMHG | DIASTOLIC BLOOD PRESSURE: 74 MMHG

## 2019-11-13 DIAGNOSIS — I49.8 OTHER SPECIFIED CARDIAC ARRHYTHMIAS: ICD-10-CM

## 2019-11-13 DIAGNOSIS — I48.4 ATYPICAL ATRIAL FLUTTER: Primary | ICD-10-CM

## 2019-11-13 DIAGNOSIS — I48.19 PERSISTENT ATRIAL FIBRILLATION: ICD-10-CM

## 2019-11-13 DIAGNOSIS — G47.33 OBSTRUCTIVE SLEEP APNEA SYNDROME: ICD-10-CM

## 2019-11-13 DIAGNOSIS — Z79.01 CHRONIC ANTICOAGULATION: ICD-10-CM

## 2019-11-13 PROCEDURE — 3008F PR BODY MASS INDEX (BMI) DOCUMENTED: ICD-10-PCS | Mod: S$GLB,,, | Performed by: NURSE PRACTITIONER

## 2019-11-13 PROCEDURE — 99214 OFFICE O/P EST MOD 30 MIN: CPT | Mod: S$GLB,,, | Performed by: NURSE PRACTITIONER

## 2019-11-13 PROCEDURE — 99214 PR OFFICE/OUTPT VISIT, EST, LEVL IV, 30-39 MIN: ICD-10-PCS | Mod: S$GLB,,, | Performed by: NURSE PRACTITIONER

## 2019-11-13 PROCEDURE — 93010 ELECTROCARDIOGRAM REPORT: CPT | Mod: S$GLB,,, | Performed by: INTERNAL MEDICINE

## 2019-11-13 PROCEDURE — 93010 RHYTHM STRIP: ICD-10-PCS | Mod: S$GLB,,, | Performed by: INTERNAL MEDICINE

## 2019-11-13 PROCEDURE — 99999 PR PBB SHADOW E&M-EST. PATIENT-LVL III: CPT | Mod: PBBFAC,,, | Performed by: NURSE PRACTITIONER

## 2019-11-13 PROCEDURE — 93005 RHYTHM STRIP: ICD-10-PCS | Mod: S$GLB,,, | Performed by: INTERNAL MEDICINE

## 2019-11-13 PROCEDURE — 3008F BODY MASS INDEX DOCD: CPT | Mod: S$GLB,,, | Performed by: NURSE PRACTITIONER

## 2019-11-13 PROCEDURE — 93005 ELECTROCARDIOGRAM TRACING: CPT | Mod: S$GLB,,, | Performed by: INTERNAL MEDICINE

## 2019-11-13 PROCEDURE — 99999 PR PBB SHADOW E&M-EST. PATIENT-LVL III: ICD-10-PCS | Mod: PBBFAC,,, | Performed by: NURSE PRACTITIONER

## 2019-11-19 ENCOUNTER — RESEARCH ENCOUNTER (OUTPATIENT)
Dept: RESEARCH | Facility: HOSPITAL | Age: 54
End: 2019-11-19

## 2019-11-19 NOTE — PROGRESS NOTES
Date: 11/19/2019     Sponsor: [employer]     Study Title/IRB Number: [id number]     Principle Investigator: Erick Agustin MD     Visit: 12 month (phone call)      Patient was contacted via telephone for 12-month research follow-up; patient reports feeling really well since last ablation procedure. Medication changes reviewed and no protocol adverse events reported.    AFEQT and EQ-5D completed per protocol; patient has been compliant with TTM and holter monitoring.      All questions answered to patients satisfaction, instructed patient to call if he has any additional questions.  Last research visit will be in clinic in approximately 3-4 months.

## 2019-12-31 ENCOUNTER — TELEPHONE (OUTPATIENT)
Dept: ELECTROPHYSIOLOGY | Facility: CLINIC | Age: 54
End: 2019-12-31

## 2019-12-31 NOTE — TELEPHONE ENCOUNTER
Spoke with pt about rescheduling appt on 2/19 with Dr. Agustin.  Pt will keep same appt time but will see Diana Matthews instead.

## 2020-02-04 ENCOUNTER — RESEARCH ENCOUNTER (OUTPATIENT)
Dept: RESEARCH | Facility: HOSPITAL | Age: 55
End: 2020-02-04

## 2020-02-04 NOTE — PROGRESS NOTES
2/4/2020     Sponsor: [employer]     Study Title/IRB Number: [id number]     Principle Investigator: Erick Agustin MD     Visit: Post 12-month holter phone call     Contacted patient when 12 month holter report was received.    He states that he had no symptoms during his holter monitoring & reports feeling well.

## 2020-02-18 NOTE — PROGRESS NOTES
"Mr. Guadalupe is a patient of Dr. Agustin and was last seen in clinic 11/13/2019.      Subjective:   Patient ID:  Juan Luis Guadalupe is a 54 y.o. male who presents for follow-up of Atrial Fibrillation  .     HPI:    Mr. Guadalupe is a 54 y.o. male with AF, ASD, sleep apnea (on CPAP) here for follow up.     Background:    Primary cardiologist is Dr. Marie.  Presented to PCP with fatigue, noted to be in atrial fibrillation 10/18.  Has noted fatigue increasing in nature over the past year.  Also noted "something not feeling right in my chest."  Echo 9/20/18 normal biventricular structure and function mildly dilated RA and LA, sinus venosus ASD < 10 mm.  Placed on Xarelto.  DCCV >> was back in AF the next day  Referred for evaluation of sleep apnea. Was told he had "mild" sleep apnea.     Underwent PVI 12/13/2018 and started on propafenone.  On 12/17/2018 he was back in AF and underwent successful cardioversion.   Felt poorly on propafenone, which was discontinued 6/2019. Xarelto also discontinued.  7/2019, developed recurrence. Xarelto restarted.    Underwent DCCV 7/8/2019. Rythmol resumed.  8/27/2019: Successful pulmonary vein RF ablation, ablation of atrial flutter (typical), posterior wall isolation. Propafenone discontinued.  Felt great at clinic follow up 11/2019.    He is 10 weeks s/p redo PVI. In SR today per EKG. Off AAD since PVI. On xarelto.  Doing great 2.5 months s/p PVI. Off AAD.  F/u in 3 months.  If remains arrhythmia free in 3 months >> refer back to Interventional for consideration of PFO closure. Also consider d/c xarelto (CHADSVASc 0).    Update (02/19/2020):    Today he says he continues to feel well. No s/s of AF, and he checks his rhythm multiple times per day with [facility]. Mr. Guadalupe denies chest pain with exertion or at rest, palpitations, SOB, LUND, dizziness, or syncope.    He is currently taking xarelto 20mg daily for stroke prophylaxis and denies significant bleeding episodes. Kidney function is " "stable, with a creatinine of 1.3 on 12/17/2019.    I have personally reviewed the patient's EKG today, which shows sinus rhythm at 66bpm. WA interval is 166. QRS is 94. QTc is 413.    Recent Cardiac Tests:    JOS (8/26/2019):  · Normal left ventricular systolic function. The estimated ejection fraction is 60%  · Mild right ventricular enlargement. Normal right ventricular systolic function.  · Severe left atrial enlargement.  · Moderate right atrial enlargement.  · Mild tricuspid regurgitation with gradient of 26mmHg.  · No LA or BHUPENDRA thrombus.  · Secundum interatrial septal defect present (0.5 x 0.8cm) with left to right shunting indicated by color flow Doppler. Normal pulmonary vein drainage into LA    Current Outpatient Medications   Medication Sig    finasteride (PROPECIA) 1 mg tablet Take 1 mg by mouth once daily.    metronidazole 1% (METROGEL) 1 % Gel     rivaroxaban (XARELTO) 20 mg Tab Take 20 mg by mouth daily with dinner or evening meal.     No current facility-administered medications for this visit.        Review of Systems   Constitution: Negative for malaise/fatigue.   Cardiovascular: Negative for chest pain, dyspnea on exertion, irregular heartbeat, leg swelling and palpitations.   Respiratory: Negative for shortness of breath.    Hematologic/Lymphatic: Negative for bleeding problem.   Skin: Negative for rash.   Musculoskeletal: Negative for myalgias.   Gastrointestinal: Negative for hematemesis, hematochezia and nausea.   Genitourinary: Negative for hematuria.   Neurological: Negative for light-headedness.   Psychiatric/Behavioral: Negative for altered mental status.   Allergic/Immunologic: Negative for persistent infections.     Objective:        /70   Pulse 66   Ht 6' 1" (1.854 m)   Wt 118.5 kg (261 lb 3.9 oz)   BMI 34.47 kg/m²     Physical Exam   Constitutional: He is oriented to person, place, and time. He appears well-developed and well-nourished.   HENT:   Head: Normocephalic. "   Nose: Nose normal.   Eyes: Pupils are equal, round, and reactive to light.   Cardiovascular: Normal rate, regular rhythm, S1 normal and S2 normal.   No murmur heard.  Pulses:       Radial pulses are 2+ on the right side, and 2+ on the left side.   Pulmonary/Chest: Breath sounds normal. No respiratory distress.   Abdominal: Normal appearance.   Musculoskeletal: Normal range of motion. He exhibits no edema.   Neurological: He is alert and oriented to person, place, and time.   Skin: Skin is warm and dry. No erythema.   Psychiatric: He has a normal mood and affect. His speech is normal and behavior is normal.   Nursing note and vitals reviewed.    Lab Results   Component Value Date     12/17/2018    K 4.3 12/17/2018    BUN 17 12/17/2018    CREATININE 1.3 12/17/2018    HGB 15.8 12/13/2018    HCT 46.2 12/13/2018     Recent Labs   Lab 12/13/18  1127   INR 1.1       Assessment:     1. Persistent atrial fibrillation    2. Atypical atrial flutter    3. Chronic anticoagulation    4. Obstructive sleep apnea syndrome    5. PFO (patent foramen ovale)      Plan:     In summary, Mr. Guadalupe is a 54 y.o. male with AF, ASD, sleep apnea (on CPAP) here for follow up.   He is now over 6 months s/p PVI and has remains arrhythmia-free off AAD. Plan is to refer to interventional cardiology for consideration of interatrial septal defect closure. CHADSVASc 0 and he will stop xarelto at this time. He remains compliant with nightly CPAP. He is going to restart an exercise program.    Can stop xarelto. Start baby aspirin (81mg) daily.  Referral to interventional cardiology.   RTC 6 mo, sooner if needed.    *A copy of this note has been sent to Dr. Agustin*    Follow up in about 6 months (around 8/19/2020).    ------------------------------------------------------------------    Diana Matthews, ATUL, NP-C  Cardiac Electrophysiology

## 2020-02-19 ENCOUNTER — HOSPITAL ENCOUNTER (OUTPATIENT)
Dept: CARDIOLOGY | Facility: CLINIC | Age: 55
Discharge: HOME | End: 2020-02-19
Payer: COMMERCIAL

## 2020-02-19 ENCOUNTER — OFFICE VISIT (OUTPATIENT)
Dept: ELECTROPHYSIOLOGY | Facility: CLINIC | Age: 55
End: 2020-02-19
Payer: COMMERCIAL

## 2020-02-19 ENCOUNTER — RESEARCH ENCOUNTER (OUTPATIENT)
Dept: RESEARCH | Facility: HOSPITAL | Age: 55
End: 2020-02-19

## 2020-02-19 VITALS
WEIGHT: 261.25 LBS | DIASTOLIC BLOOD PRESSURE: 70 MMHG | SYSTOLIC BLOOD PRESSURE: 100 MMHG | BODY MASS INDEX: 34.62 KG/M2 | HEART RATE: 66 BPM | HEIGHT: 73 IN

## 2020-02-19 DIAGNOSIS — G47.33 OBSTRUCTIVE SLEEP APNEA SYNDROME: ICD-10-CM

## 2020-02-19 DIAGNOSIS — Q21.12 PFO (PATENT FORAMEN OVALE): ICD-10-CM

## 2020-02-19 DIAGNOSIS — I48.19 PERSISTENT ATRIAL FIBRILLATION: Primary | ICD-10-CM

## 2020-02-19 DIAGNOSIS — Z79.01 CHRONIC ANTICOAGULATION: ICD-10-CM

## 2020-02-19 DIAGNOSIS — I48.4 ATYPICAL ATRIAL FLUTTER: ICD-10-CM

## 2020-02-19 DIAGNOSIS — I49.8 OTHER SPECIFIED CARDIAC ARRHYTHMIAS: ICD-10-CM

## 2020-02-19 PROCEDURE — 99999 PR PBB SHADOW E&M-EST. PATIENT-LVL III: CPT | Mod: PBBFAC,,, | Performed by: NURSE PRACTITIONER

## 2020-02-19 PROCEDURE — 99214 OFFICE O/P EST MOD 30 MIN: CPT | Mod: S$GLB,,, | Performed by: NURSE PRACTITIONER

## 2020-02-19 PROCEDURE — 3008F PR BODY MASS INDEX (BMI) DOCUMENTED: ICD-10-PCS | Mod: S$GLB,,, | Performed by: NURSE PRACTITIONER

## 2020-02-19 PROCEDURE — 99999 PR PBB SHADOW E&M-EST. PATIENT-LVL III: ICD-10-PCS | Mod: PBBFAC,,, | Performed by: NURSE PRACTITIONER

## 2020-02-19 PROCEDURE — 93010 ELECTROCARDIOGRAM REPORT: CPT | Mod: S$GLB,,, | Performed by: INTERNAL MEDICINE

## 2020-02-19 PROCEDURE — 99214 PR OFFICE/OUTPT VISIT, EST, LEVL IV, 30-39 MIN: ICD-10-PCS | Mod: S$GLB,,, | Performed by: NURSE PRACTITIONER

## 2020-02-19 PROCEDURE — 93005 ELECTROCARDIOGRAM TRACING: CPT | Mod: S$GLB,,, | Performed by: INTERNAL MEDICINE

## 2020-02-19 PROCEDURE — 3008F BODY MASS INDEX DOCD: CPT | Mod: S$GLB,,, | Performed by: NURSE PRACTITIONER

## 2020-02-19 PROCEDURE — 93005 RHYTHM STRIP: ICD-10-PCS | Mod: S$GLB,,, | Performed by: INTERNAL MEDICINE

## 2020-02-19 PROCEDURE — 93010 RHYTHM STRIP: ICD-10-PCS | Mod: S$GLB,,, | Performed by: INTERNAL MEDICINE

## 2020-02-19 RX ORDER — ASPIRIN 81 MG/1
81 TABLET ORAL DAILY
Qty: 30 TABLET | Refills: 0
Start: 2020-02-19

## 2020-02-20 NOTE — PROGRESS NOTES
Date: 2/19/2020     Sponsor: [employer]     Study Title/IRB Number: [id number]     Principle Investigator: Erick Agustin MD     Visit: 15-month FINAL follow-up     Patient presented to clinic for 15-month FINAL follow-up.  Reports feeling well, no protocol adverse event reported since last visit medications reviewed.   AFEQT, ED-5DL and EKG completed per protocol.  Patient has been compliant with TTMs and Holter monitoring.  Will have last holter monitor scheduled with [facility].    All questions answered to patients satisfaction, informed patient that once he completes his 15-month holter he will have completed the trial.  Thanked patient for his participation and compliance with the research study.  Informed patient to call if he has any additional questions.

## 2020-02-26 ENCOUNTER — DOCUMENTATION ONLY (OUTPATIENT)
Dept: CARDIAC CATH/INVASIVE PROCEDURES | Facility: HOSPITAL | Age: 55
End: 2020-02-26

## 2020-02-26 DIAGNOSIS — Q21.10 ASD (ATRIAL SEPTAL DEFECT): ICD-10-CM

## 2020-02-26 DIAGNOSIS — I48.19 PERSISTENT ATRIAL FIBRILLATION: Primary | ICD-10-CM

## 2020-02-26 NOTE — PROGRESS NOTES
Patient has a small secundum ASD and atrial fibrillation which has been ablated.  Will close the ASD.  Pictures saved on paper.

## 2020-04-14 ENCOUNTER — RESEARCH ENCOUNTER (OUTPATIENT)
Dept: RESEARCH | Facility: HOSPITAL | Age: 55
End: 2020-04-14

## 2020-05-11 NOTE — PROGRESS NOTES
4/14/2020     Sponsor: [employer]     Study Title/IRB Number: [id number]     Principle Investigator: Erick Agustin MD     Visit: Post 15-month holter phone call     Contacted patient when 15 month holter report was received.    He states that he had no symptoms during his holter monitoring & reports feeling well.  Thanked patient for his participation, all study related activities have been completed.

## 2023-06-27 ENCOUNTER — TELEPHONE (OUTPATIENT)
Dept: ELECTROPHYSIOLOGY | Facility: CLINIC | Age: 58
End: 2023-06-27
Payer: COMMERCIAL

## 2023-06-27 DIAGNOSIS — I48.0 PAROXYSMAL ATRIAL FIBRILLATION: Primary | ICD-10-CM

## 2023-06-27 NOTE — TELEPHONE ENCOUNTER
Spoke with patient. He had PVI/CTI/RFA with Dr Agustin in 2019. Has not followed up since 2020. States he has been fine until the past month. Has been in AF for about a month. Admits to being in the heat and getting dehydrated frequently, is non-compliant with his CPAP, and does frequently drink beer to cool down. He saw his local cardiologist who put him back on Xarelto, Metoprolol, and Propafenone. Rates are better but he remains in AF.   He would like to see Dr Agustin again. He was very complimentary of the care that he got here in 2019 and wants to re-establish. Appt scheduled.

## 2023-06-27 NOTE — TELEPHONE ENCOUNTER
----- Message from Madelin Johnston sent at 6/27/2023  3:43 PM CDT -----  Regarding: afib  Sharmila Webster is requesting to speak with you.  He is a prior patient  of Dr. Agustin and saw Diana Matthews on 2/19/20.  Dr. Marie in [place] is referring him back for afib issues he is having again and he would like to discuss some things with you.    Thank you

## 2023-06-28 NOTE — PROGRESS NOTES
Subjective:   Patient ID:  Juan Luis Guadalupe is a 57 y.o. male who presents for evaluation of Atrial Fibrillation      HPI 58 yo male with AF, ASD, sleep apnea (on CPAP)     Background:     Primary cardiologist is Dr. Marie.  Presented to PCP with fatigue, noted to be in atrial fibrillation 10/18.    Echo 9/20/18 normal biventricular structure and function mildly dilated RA and LA, sinus venosus ASD < 10 mm.  Placed on Xarelto.  DCCV >> was back in AF the next day     12/13/2018 PVI (RFA). Started on Propafenone.  On 12/17/2018 he was back in AF and underwent successful cardioversion.   Felt poorly on propafenone, which was discontinued 6/2019.   7/2019, developed recurrence. Xarelto restarted >> DCCV 7/8/2019. Rythmol resumed.    8/27/2019: Repeat PVI (LSPV and RIPV) ablation of atrial flutter (typical), posterior wall isolation. Propafenone discontinued.  Last seen in 2020. Was referred back to Interventional cardiology for ASD closure. Ultimately decision made to defer closure.    Update:  6/23 developed persistent recurrence, symptomatic. Initially occurred in the setting of dehydration/increasing activity and challenges with CPAP. However, AF has persisted.  Xarelto resumed. Taking in the morning.  Placed on metoprolol and Propafenone. No  DCCV. Has remained persistently out of rhythm. Propafenone discontinued after one week.  Echo 6/5/23 EF >60% normal RV size and function, mild MR, mildly dilated LA        Review of Systems   Constitutional: Positive for malaise/fatigue. Negative for fever.   HENT:  Negative for congestion and sore throat.    Cardiovascular:  Positive for palpitations. Negative for chest pain, dyspnea on exertion, irregular heartbeat, leg swelling, near-syncope, orthopnea, paroxysmal nocturnal dyspnea and syncope.   Respiratory:  Negative for cough and shortness of breath.    Gastrointestinal:  Negative for abdominal pain, constipation and diarrhea.   Neurological:  Positive for headaches.  Negative for dizziness, light-headedness and weakness.   Psychiatric/Behavioral:  Negative for depression. The patient is not nervous/anxious.    All other systems reviewed and are negative.    Objective:   Physical Exam  Constitutional:       Appearance: He is well-developed.   Eyes:      General: No scleral icterus.     Conjunctiva/sclera: Conjunctivae normal.   Neck:      Vascular: No JVD.      Trachea: No tracheal deviation.   Cardiovascular:      Rate and Rhythm: Normal rate. Rhythm irregular.      Chest Wall: PMI is not displaced.      Heart sounds: Normal heart sounds.   Pulmonary:      Effort: Pulmonary effort is normal. No respiratory distress.      Breath sounds: Normal breath sounds.   Abdominal:      Palpations: Abdomen is soft.      Tenderness: There is no abdominal tenderness.   Musculoskeletal:         General: No tenderness.   Skin:     General: Skin is warm and dry.      Findings: No rash.   Neurological:      Mental Status: He is alert and oriented to person, place, and time.   Psychiatric:         Behavior: Behavior normal.       Assessment:      1. Persistent atrial fibrillation    2. ASD (atrial septal defect)    3. Atypical atrial flutter    4. Obstructive sleep apnea syndrome        Plan:     Persistent atrial fibrillation, symptomatic.  Recommend DCCV + metoprolol, and assess for symptomatic benefit.  If has symptomatic recurrence >> options would be repeat ablation vs anti-arrhythmic therapy.  JOS/DCCV. Discussed need for taking Xarelto with dinner.

## 2023-06-28 NOTE — H&P (VIEW-ONLY)
Subjective:   Patient ID:  Juan Luis Guadalupe is a 57 y.o. male who presents for evaluation of Atrial Fibrillation      HPI 56 yo male with AF, ASD, sleep apnea (on CPAP)     Background:     Primary cardiologist is Dr. Marie.  Presented to PCP with fatigue, noted to be in atrial fibrillation 10/18.    Echo 9/20/18 normal biventricular structure and function mildly dilated RA and LA, sinus venosus ASD < 10 mm.  Placed on Xarelto.  DCCV >> was back in AF the next day     12/13/2018 PVI (RFA). Started on Propafenone.  On 12/17/2018 he was back in AF and underwent successful cardioversion.   Felt poorly on propafenone, which was discontinued 6/2019.   7/2019, developed recurrence. Xarelto restarted >> DCCV 7/8/2019. Rythmol resumed.    8/27/2019: Repeat PVI (LSPV and RIPV) ablation of atrial flutter (typical), posterior wall isolation. Propafenone discontinued.  Last seen in 2020. Was referred back to Interventional cardiology for ASD closure. Ultimately decision made to defer closure.    Update:  6/23 developed persistent recurrence, symptomatic. Initially occurred in the setting of dehydration/increasing activity and challenges with CPAP. However, AF has persisted.  Xarelto resumed. Taking in the morning.  Placed on metoprolol and Propafenone. No  DCCV. Has remained persistently out of rhythm. Propafenone discontinued after one week.  Echo 6/5/23 EF >60% normal RV size and function, mild MR, mildly dilated LA        Review of Systems   Constitutional: Positive for malaise/fatigue. Negative for fever.   HENT:  Negative for congestion and sore throat.    Cardiovascular:  Positive for palpitations. Negative for chest pain, dyspnea on exertion, irregular heartbeat, leg swelling, near-syncope, orthopnea, paroxysmal nocturnal dyspnea and syncope.   Respiratory:  Negative for cough and shortness of breath.    Gastrointestinal:  Negative for abdominal pain, constipation and diarrhea.   Neurological:  Positive for headaches.  Negative for dizziness, light-headedness and weakness.   Psychiatric/Behavioral:  Negative for depression. The patient is not nervous/anxious.    All other systems reviewed and are negative.    Objective:   Physical Exam  Constitutional:       Appearance: He is well-developed.   Eyes:      General: No scleral icterus.     Conjunctiva/sclera: Conjunctivae normal.   Neck:      Vascular: No JVD.      Trachea: No tracheal deviation.   Cardiovascular:      Rate and Rhythm: Normal rate. Rhythm irregular.      Chest Wall: PMI is not displaced.      Heart sounds: Normal heart sounds.   Pulmonary:      Effort: Pulmonary effort is normal. No respiratory distress.      Breath sounds: Normal breath sounds.   Abdominal:      Palpations: Abdomen is soft.      Tenderness: There is no abdominal tenderness.   Musculoskeletal:         General: No tenderness.   Skin:     General: Skin is warm and dry.      Findings: No rash.   Neurological:      Mental Status: He is alert and oriented to person, place, and time.   Psychiatric:         Behavior: Behavior normal.       Assessment:      1. Persistent atrial fibrillation    2. ASD (atrial septal defect)    3. Atypical atrial flutter    4. Obstructive sleep apnea syndrome        Plan:     Persistent atrial fibrillation, symptomatic.  Recommend DCCV + metoprolol, and assess for symptomatic benefit.  If has symptomatic recurrence >> options would be repeat ablation vs anti-arrhythmic therapy.  JOS/DCCV. Discussed need for taking Xarelto with dinner.

## 2023-07-03 ENCOUNTER — TELEPHONE (OUTPATIENT)
Dept: ELECTROPHYSIOLOGY | Facility: CLINIC | Age: 58
End: 2023-07-03
Payer: COMMERCIAL

## 2023-07-05 ENCOUNTER — OFFICE VISIT (OUTPATIENT)
Dept: ELECTROPHYSIOLOGY | Facility: CLINIC | Age: 58
End: 2023-07-05
Payer: COMMERCIAL

## 2023-07-05 ENCOUNTER — HOSPITAL ENCOUNTER (OUTPATIENT)
Dept: CARDIOLOGY | Facility: CLINIC | Age: 58
Discharge: HOME | End: 2023-07-05
Payer: COMMERCIAL

## 2023-07-05 VITALS
DIASTOLIC BLOOD PRESSURE: 68 MMHG | HEART RATE: 77 BPM | BODY MASS INDEX: 34.68 KG/M2 | WEIGHT: 261.69 LBS | HEIGHT: 73 IN | SYSTOLIC BLOOD PRESSURE: 115 MMHG

## 2023-07-05 DIAGNOSIS — I48.19 PERSISTENT ATRIAL FIBRILLATION: Primary | ICD-10-CM

## 2023-07-05 DIAGNOSIS — Z01.812 ENCOUNTER FOR PRE-OPERATIVE LABORATORY TESTING: ICD-10-CM

## 2023-07-05 DIAGNOSIS — Q21.10 ASD (ATRIAL SEPTAL DEFECT): ICD-10-CM

## 2023-07-05 DIAGNOSIS — I48.0 PAROXYSMAL ATRIAL FIBRILLATION: ICD-10-CM

## 2023-07-05 DIAGNOSIS — I48.4 ATYPICAL ATRIAL FLUTTER: ICD-10-CM

## 2023-07-05 DIAGNOSIS — G47.33 OBSTRUCTIVE SLEEP APNEA SYNDROME: ICD-10-CM

## 2023-07-05 PROCEDURE — 99205 OFFICE O/P NEW HI 60 MIN: CPT | Mod: S$GLB,,, | Performed by: INTERNAL MEDICINE

## 2023-07-05 PROCEDURE — 93010 RHYTHM STRIP: ICD-10-PCS | Mod: S$GLB,,, | Performed by: INTERNAL MEDICINE

## 2023-07-05 PROCEDURE — 3008F PR BODY MASS INDEX (BMI) DOCUMENTED: ICD-10-PCS | Mod: CPTII,S$GLB,, | Performed by: INTERNAL MEDICINE

## 2023-07-05 PROCEDURE — 3078F PR MOST RECENT DIASTOLIC BLOOD PRESSURE < 80 MM HG: ICD-10-PCS | Mod: CPTII,S$GLB,, | Performed by: INTERNAL MEDICINE

## 2023-07-05 PROCEDURE — 1159F MED LIST DOCD IN RCRD: CPT | Mod: CPTII,S$GLB,, | Performed by: INTERNAL MEDICINE

## 2023-07-05 PROCEDURE — 1159F PR MEDICATION LIST DOCUMENTED IN MEDICAL RECORD: ICD-10-PCS | Mod: CPTII,S$GLB,, | Performed by: INTERNAL MEDICINE

## 2023-07-05 PROCEDURE — 3074F SYST BP LT 130 MM HG: CPT | Mod: CPTII,S$GLB,, | Performed by: INTERNAL MEDICINE

## 2023-07-05 PROCEDURE — 99205 PR OFFICE/OUTPT VISIT, NEW, LEVL V, 60-74 MIN: ICD-10-PCS | Mod: S$GLB,,, | Performed by: INTERNAL MEDICINE

## 2023-07-05 PROCEDURE — 3078F DIAST BP <80 MM HG: CPT | Mod: CPTII,S$GLB,, | Performed by: INTERNAL MEDICINE

## 2023-07-05 PROCEDURE — 99999 PR PBB SHADOW E&M-EST. PATIENT-LVL III: CPT | Mod: PBBFAC,,, | Performed by: INTERNAL MEDICINE

## 2023-07-05 PROCEDURE — 93005 RHYTHM STRIP: ICD-10-PCS | Mod: S$GLB,,, | Performed by: INTERNAL MEDICINE

## 2023-07-05 PROCEDURE — 3008F BODY MASS INDEX DOCD: CPT | Mod: CPTII,S$GLB,, | Performed by: INTERNAL MEDICINE

## 2023-07-05 PROCEDURE — 99999 PR PBB SHADOW E&M-EST. PATIENT-LVL III: ICD-10-PCS | Mod: PBBFAC,,, | Performed by: INTERNAL MEDICINE

## 2023-07-05 PROCEDURE — 93005 ELECTROCARDIOGRAM TRACING: CPT | Mod: S$GLB,,, | Performed by: INTERNAL MEDICINE

## 2023-07-05 PROCEDURE — 93010 ELECTROCARDIOGRAM REPORT: CPT | Mod: S$GLB,,, | Performed by: INTERNAL MEDICINE

## 2023-07-05 PROCEDURE — 3074F PR MOST RECENT SYSTOLIC BLOOD PRESSURE < 130 MM HG: ICD-10-PCS | Mod: CPTII,S$GLB,, | Performed by: INTERNAL MEDICINE

## 2023-07-05 RX ORDER — METOPROLOL SUCCINATE 25 MG/1
25 TABLET, EXTENDED RELEASE ORAL DAILY
Status: ON HOLD | COMMUNITY
End: 2023-07-12 | Stop reason: HOSPADM

## 2023-07-11 ENCOUNTER — TELEPHONE (OUTPATIENT)
Dept: ELECTROPHYSIOLOGY | Facility: CLINIC | Age: 58
End: 2023-07-11
Payer: COMMERCIAL

## 2023-07-11 NOTE — TELEPHONE ENCOUNTER
Left message for patient to return call to review pre op instructions. The following reminders were left:  Continue Xarelto, with evening meal, as instructed  NPO after 12 mn tonight  Arrival time 8am for JOS/DCCV with Dr Agustin in am  Call back # left for confirmation

## 2023-07-11 NOTE — CONSULTS
[facility], [place]  JOS Consult      Juan Luis Guadalupe  YOB: 1965  Medical Record Number:  [id number]  Attending Physician:  Erick Agustin MD   Current Principal Problem: Persistent Atrial Fibrillation    History     Cc: Persistent Atrial Fibrillation    HPI  58yo male here for JOS DCCV. He has a history of AF s/p PVI/CTI/PWI RFA 2019, ASD and CHRISTIANO. He reports feeling somewhat fatigued, but denies symptoms of HF or angina.    JOS 8/26/19  Normal left ventricular systolic function. The estimated ejection fraction is 60%  Mild right ventricular enlargement. Normal right ventricular systolic function.  Severe left atrial enlargement.  Moderate right atrial enlargement.  Mild tricuspid regurgitation with gradient of 26mmHg.  No LA or BHUPENDRA thrombus.  Secundum interatrial septal defect present (0.5 x 0.8cm) with left to right shunting indicated by color flow Doppler. Normal pulmonary vein drainage into LA    Anticoagulant/antiplatelets: xarelto, compliant but recently switched from taking on empty stomach in the morning to with dinner in the past 7 days.  ECG: atrial fibrillation    Dysphagia or odynophagia:  No  Liver Disease, esophageal disease, or known varices:  No  Upper GI Bleeding: No  Snoring:  Yes  Sleep Apnea:  Yes  Prior neck surgery or radiation:  No  History of anesthetic difficulties:  No  Family history of anesthetic difficulties:  No  Last oral intake:  12 hours ago  Able to move neck in all directions:  Yes    Medications - Outpatient  Prior to Admission medications    Medication Sig Start Date End Date Taking? Authorizing Provider   aspirin (ECOTRIN) 81 MG EC tablet Take 1 tablet (81 mg total) by mouth once daily.  Patient not taking: Reported on 7/5/2023 2/19/20   Diana Matthews NP   finasteride (PROPECIA) 1 mg tablet Take 1 mg by mouth daily. 5/29/19   Historical Provider   metoprolol succinate (TOPROL-XL) 25 MG 24 hr tablet Take 25 mg by mouth once daily. Patient  taking BID    Historical Provider   rivaroxaban (XARELTO) 20 mg Tab Take 20 mg by mouth daily with dinner or evening meal.    Historical Provider     Allergies  Review of patient's allergies indicates:  No Known Allergies  Past Medical History  Past Medical History:   Diagnosis Date    Anticoagulant long-term use     ASD (atrial septal defect)     Atrial fibrillation      Past Surgical History  Past Surgical History:   Procedure Laterality Date    ABLATION OF ARRHYTHMOGENIC FOCUS FOR ATRIAL FIBRILLATION N/A 12/14/2018    Procedure: ABLATION, ARRHYTHMOGENIC FOCUS, FOR ATRIAL FIBRILLATION;  Surgeon: Erick Agustin MD;  Location: [facility] EP LAB;  Service: Cardiology;  Laterality: N/A;  AF, PVI, RFA, JILLIAN, Gen, SK, 3prep    ABLATION OF ARRHYTHMOGENIC FOCUS FOR ATRIAL FIBRILLATION N/A 8/27/2019    Procedure: Ablation atrial fibrillation;  Surgeon: Erick Agustin MD;  Location: [facility] EP LAB;  Service: Cardiology;  Laterality: N/A;  AF, AFl (atypical), PVI (re-do), RFA, JILLIAN, Gen, SK, 3 Prep    CARDIOVERSION N/A 12/17/2018    Procedure: CARDIOVERSION;  Surgeon: Erick Agustin MD;  Location: [facility] EP LAB;  Service: Cardiology;  Laterality: N/A;  AF, DCCV, MAC, SK, 3prep    TREATMENT OF CARDIAC ARRHYTHMIA  12/14/2018    Procedure: Cardioversion or Defibrillation;  Surgeon: Erick Agustin MD;  Location: [facility] EP LAB;  Service: Cardiology;;    TREATMENT OF CARDIAC ARRHYTHMIA N/A 7/8/2019    Procedure: Cardioversion or Defibrillation;  Surgeon: Erick Agustin MD;  Location: [facility] EP LAB;  Service: Cardiology;  Laterality: N/A;  AFl, DCCV, MAC, SK, 3 Prep     ROS  10 point ROS performed and negative except as stated in HPI     Physical Examination   Vital Signs  Vitals  Temp: 97.9 °F (36.6 °C)  Temp Source: Temporal  Pulse: 88  Heart Rate Source: SpO2  Resp: 18  SpO2: 98 %  BP: 120/85  MAP (mmHg): 98  BP Location: Left arm  BP Method: Automatic  Patient Position: Lying    24 Hour VS Range  Temp:  [97.9 °F (36.6 °C)]   Pulse:  [88]   Resp:   [18]   BP: (119-120)/(85-88)   SpO2:  [98 %]   No intake or output data in the 24 hours ending 07/12/23 0912      Physical Exam:   Constitutional: no acute distress  HEENT: NCAT, EOMI, no scleral icterus  Cardiovascular: Regular rate and rhythm  Pulmonary: Normal respiratory effort   Abdomen: nontender, non-distended   Neuro: alert and oriented, no focal deficits  Extremities: warm, no edema   MSK: no deformities  Skin: intact, no rashes     Data     Recent Labs   Lab 07/10/23  1309   WBC 4.5   HGB 15.3   HCT 45.2         Recent Labs   Lab 07/10/23  1309   INR 1.10      No results for input(s): NA, K, CL, CO2, BUN, CREATININE, ANIONGAP, CALCIUM in the last 168 hours.   Assessment & Plan     58 yo male with AF, ASD, CHRISTIANO on CPAP presenting for JOS DCCV. EF 60% 6/5/23.  [id number] 0. On xarelto, compliant but recently switched from taking on empty stomach in the morning to with dinner in the past 7 days.    BHUPENDRA assessment prior to cardioversion  -No absolute contraindications of esophageal stricture, tumor, perforation, laceration,or diverticulum and/or active GI bleed  -The risks, benefits & alternatives of the procedure were explained to the patient.   -The risks of transesophageal echo include but are not limited to:  Dental trauma, esophageal trauma/perforation, bleeding, laryngospasm/brochospasm, aspiration, sore throat/hoarseness, & dislodgement of the endotracheal tube/nasogastric tube (where applicable).    -The risks of ANES monitored sedation include hypotension, respiratory depression, arrhythmias, bronchospasm, & death.    -Informed consent was obtained. The patient is agreeable to proceed with the procedure and all questions and concerns addressed.    Case discussed with an attending in echocardiography lab.    Clark Combs MD  [facility]   Cardiovascular Disease PGY-V

## 2023-07-12 ENCOUNTER — ANESTHESIA (OUTPATIENT)
Dept: MEDSURG UNIT | Facility: HOSPITAL | Age: 58
End: 2023-07-12
Payer: COMMERCIAL

## 2023-07-12 ENCOUNTER — HOSPITAL ENCOUNTER (OUTPATIENT)
Facility: HOSPITAL | Age: 58
Discharge: HOME | End: 2023-07-12
Attending: INTERNAL MEDICINE | Admitting: INTERNAL MEDICINE
Payer: COMMERCIAL

## 2023-07-12 ENCOUNTER — ANESTHESIA EVENT (OUTPATIENT)
Dept: MEDSURG UNIT | Facility: HOSPITAL | Age: 58
End: 2023-07-12
Payer: COMMERCIAL

## 2023-07-12 ENCOUNTER — HOSPITAL ENCOUNTER (OUTPATIENT)
Dept: CARDIOLOGY | Facility: HOSPITAL | Age: 58
Discharge: HOME | End: 2023-07-12
Attending: INTERNAL MEDICINE | Admitting: INTERNAL MEDICINE
Payer: COMMERCIAL

## 2023-07-12 VITALS
WEIGHT: 255 LBS | SYSTOLIC BLOOD PRESSURE: 112 MMHG | TEMPERATURE: 98 F | OXYGEN SATURATION: 99 % | HEIGHT: 73 IN | DIASTOLIC BLOOD PRESSURE: 75 MMHG | HEART RATE: 57 BPM | RESPIRATION RATE: 28 BRPM | BODY MASS INDEX: 33.8 KG/M2

## 2023-07-12 VITALS
SYSTOLIC BLOOD PRESSURE: 145 MMHG | DIASTOLIC BLOOD PRESSURE: 104 MMHG | HEIGHT: 73 IN | WEIGHT: 255 LBS | BODY MASS INDEX: 33.8 KG/M2

## 2023-07-12 DIAGNOSIS — I48.19 PERSISTENT ATRIAL FIBRILLATION: ICD-10-CM

## 2023-07-12 DIAGNOSIS — I48.91 ATRIAL FIBRILLATION: ICD-10-CM

## 2023-07-12 DIAGNOSIS — I48.91 A-FIB: ICD-10-CM

## 2023-07-12 LAB
AORTIC ROOT ANNULUS: 3.7 CM
ASCENDING AORTA: 3.2 CM
BSA FOR ECHO PROCEDURE: 2.44 M2
EJECTION FRACTION: 60 %
STJ: 3.1 CM

## 2023-07-12 PROCEDURE — 93010 ELECTROCARDIOGRAM REPORT: CPT | Mod: ,,, | Performed by: INTERNAL MEDICINE

## 2023-07-12 PROCEDURE — 93325 TRANSESOPHAGEAL ECHO (TEE) (CUPID ONLY): ICD-10-PCS | Mod: 26,,, | Performed by: INTERNAL MEDICINE

## 2023-07-12 PROCEDURE — D9220A PRA ANESTHESIA: Mod: ANES,,, | Performed by: ANESTHESIOLOGY

## 2023-07-12 PROCEDURE — 37000009 HC ANESTHESIA EA ADD 15 MINS: Performed by: INTERNAL MEDICINE

## 2023-07-12 PROCEDURE — 37000008 HC ANESTHESIA 1ST 15 MINUTES: Performed by: INTERNAL MEDICINE

## 2023-07-12 PROCEDURE — 93325 DOPPLER ECHO COLOR FLOW MAPG: CPT

## 2023-07-12 PROCEDURE — 93010 EKG 12-LEAD: ICD-10-PCS | Mod: ,,, | Performed by: INTERNAL MEDICINE

## 2023-07-12 PROCEDURE — 63600175 PHARM REV CODE 636 W HCPCS: Performed by: NURSE ANESTHETIST, CERTIFIED REGISTERED

## 2023-07-12 PROCEDURE — D9220A PRA ANESTHESIA: ICD-10-PCS | Mod: CRNA,,, | Performed by: NURSE ANESTHETIST, CERTIFIED REGISTERED

## 2023-07-12 PROCEDURE — 25000003 PHARM REV CODE 250: Performed by: NURSE ANESTHETIST, CERTIFIED REGISTERED

## 2023-07-12 PROCEDURE — 93320 TRANSESOPHAGEAL ECHO (TEE) (CUPID ONLY): ICD-10-PCS | Mod: 26,,, | Performed by: INTERNAL MEDICINE

## 2023-07-12 PROCEDURE — 92960 PR CARDIOVERSION, ELECTIVE;EXTERN: ICD-10-PCS | Mod: ,,, | Performed by: INTERNAL MEDICINE

## 2023-07-12 PROCEDURE — 93005 ELECTROCARDIOGRAM TRACING: CPT

## 2023-07-12 PROCEDURE — 92960 CARDIOVERSION ELECTRIC EXT: CPT | Performed by: INTERNAL MEDICINE

## 2023-07-12 PROCEDURE — 93312 ECHO TRANSESOPHAGEAL: CPT | Mod: 26,,, | Performed by: INTERNAL MEDICINE

## 2023-07-12 PROCEDURE — 92960 CARDIOVERSION ELECTRIC EXT: CPT | Mod: ,,, | Performed by: INTERNAL MEDICINE

## 2023-07-12 PROCEDURE — 93312 TRANSESOPHAGEAL ECHO (TEE) (CUPID ONLY): ICD-10-PCS | Mod: 26,,, | Performed by: INTERNAL MEDICINE

## 2023-07-12 PROCEDURE — 93010 EKG 12-LEAD: ICD-10-PCS | Mod: 77,,, | Performed by: INTERNAL MEDICINE

## 2023-07-12 PROCEDURE — D9220A PRA ANESTHESIA: ICD-10-PCS | Mod: ANES,,, | Performed by: ANESTHESIOLOGY

## 2023-07-12 PROCEDURE — D9220A PRA ANESTHESIA: Mod: CRNA,,, | Performed by: NURSE ANESTHETIST, CERTIFIED REGISTERED

## 2023-07-12 PROCEDURE — 93325 DOPPLER ECHO COLOR FLOW MAPG: CPT | Mod: 26,,, | Performed by: INTERNAL MEDICINE

## 2023-07-12 PROCEDURE — 93320 DOPPLER ECHO COMPLETE: CPT | Mod: 26,,, | Performed by: INTERNAL MEDICINE

## 2023-07-12 PROCEDURE — 93010 ELECTROCARDIOGRAM REPORT: CPT | Mod: 77,,, | Performed by: INTERNAL MEDICINE

## 2023-07-12 RX ORDER — PROPOFOL 10 MG/ML
VIAL (ML) INTRAVENOUS CONTINUOUS PRN
Status: DISCONTINUED | OUTPATIENT
Start: 2023-07-12 | End: 2023-07-12

## 2023-07-12 RX ORDER — LIDOCAINE HYDROCHLORIDE 20 MG/ML
INJECTION INTRAVENOUS
Status: DISCONTINUED | OUTPATIENT
Start: 2023-07-12 | End: 2023-07-12

## 2023-07-12 RX ORDER — METOPROLOL SUCCINATE 50 MG/1
50 TABLET, EXTENDED RELEASE ORAL DAILY
Qty: 30 TABLET | Refills: 11 | Status: ON HOLD | OUTPATIENT
Start: 2023-07-12 | End: 2023-12-18 | Stop reason: HOSPADM

## 2023-07-12 RX ORDER — PROPOFOL 10 MG/ML
VIAL (ML) INTRAVENOUS
Status: DISCONTINUED | OUTPATIENT
Start: 2023-07-12 | End: 2023-07-12

## 2023-07-12 RX ORDER — FENTANYL CITRATE 50 UG/ML
25 INJECTION, SOLUTION INTRAMUSCULAR; INTRAVENOUS EVERY 5 MIN PRN
Status: DISCONTINUED | OUTPATIENT
Start: 2023-07-12 | End: 2023-07-12 | Stop reason: HOSPADM

## 2023-07-12 RX ORDER — ONDANSETRON 2 MG/ML
4 INJECTION INTRAMUSCULAR; INTRAVENOUS DAILY PRN
Status: DISCONTINUED | OUTPATIENT
Start: 2023-07-12 | End: 2023-07-12 | Stop reason: HOSPADM

## 2023-07-12 RX ADMIN — Medication 150 MCG/KG/MIN: at 10:07

## 2023-07-12 RX ADMIN — PROPOFOL 70 MG: 10 INJECTION, EMULSION INTRAVENOUS at 10:07

## 2023-07-12 RX ADMIN — PROPOFOL 30 MG: 10 INJECTION, EMULSION INTRAVENOUS at 10:07

## 2023-07-12 RX ADMIN — SODIUM CHLORIDE: 0.9 INJECTION, SOLUTION INTRAVENOUS at 10:07

## 2023-07-12 RX ADMIN — LIDOCAINE HYDROCHLORIDE 100 MG: 20 INJECTION INTRAVENOUS at 10:07

## 2023-07-12 NOTE — ANESTHESIA PREPROCEDURE EVALUATION
07/12/2023  Juan Luis Guadalupe is a 57 y.o., male.  Pre-operative evaluation for Procedure(s) (LRB):  Cardioversion or Defibrillation (N/A)  ECHOCARDIOGRAM, TRANSESOPHAGEAL (N/A)    Juan Luis Guadalupe is a 57 y.o. male       Patient Active Problem List   Diagnosis    Persistent atrial fibrillation    ASD (atrial septal defect)    Tongue lesion    Chronic anticoagulation    Obstructive sleep apnea syndrome    Atypical atrial flutter       Past Surgical History:   Procedure Laterality Date    ABLATION OF ARRHYTHMOGENIC FOCUS FOR ATRIAL FIBRILLATION N/A 12/14/2018    Procedure: ABLATION, ARRHYTHMOGENIC FOCUS, FOR ATRIAL FIBRILLATION;  Surgeon: Erick Agustin MD;  Location: [facility] EP LAB;  Service: Cardiology;  Laterality: N/A;  AF, PVI, RFA, JILLIAN, Gen, SK, 3prep    ABLATION OF ARRHYTHMOGENIC FOCUS FOR ATRIAL FIBRILLATION N/A 8/27/2019    Procedure: Ablation atrial fibrillation;  Surgeon: Erick Agustin MD;  Location: [facility] EP LAB;  Service: Cardiology;  Laterality: N/A;  AF, AFl (atypical), PVI (re-do), RFA, JILLIAN, Gen, SK, 3 Prep    CARDIOVERSION N/A 12/17/2018    Procedure: CARDIOVERSION;  Surgeon: Erick Agustin MD;  Location: [facility] EP LAB;  Service: Cardiology;  Laterality: N/A;  AF, DCCV, MAC, SK, 3prep    TREATMENT OF CARDIAC ARRHYTHMIA  12/14/2018    Procedure: Cardioversion or Defibrillation;  Surgeon: Erick Agustin MD;  Location: [facility] EP LAB;  Service: Cardiology;;    TREATMENT OF CARDIAC ARRHYTHMIA N/A 7/8/2019    Procedure: Cardioversion or Defibrillation;  Surgeon: Erick Agustin MD;  Location: [facility] EP LAB;  Service: Cardiology;  Laterality: N/A;  AFl, DCCV, MAC, SK, 3 Prep       Social History     Socioeconomic History    Marital status: Single   Tobacco Use    Smoking status: Never    Smokeless tobacco: Never   Substance and Sexual Activity    Alcohol use: Yes     Alcohol/week: 2.0 standard  drinks     Types: 2 Glasses of wine per week     Comment: occassional    Drug use: No       No current facility-administered medications on file prior to encounter.     Current Outpatient Medications on File Prior to Encounter   Medication Sig Dispense Refill    aspirin (ECOTRIN) 81 MG EC tablet Take 1 tablet (81 mg total) by mouth once daily. (Patient not taking: Reported on 7/5/2023) 30 tablet 0    finasteride (PROPECIA) 1 mg tablet Take 1 mg by mouth once daily.  3    metoprolol succinate (TOPROL-XL) 25 MG 24 hr tablet Take 25 mg by mouth once daily. Patient taking BID      metronidazole 1% (METROGEL) 1 % Gel   5    rivaroxaban (XARELTO) 20 mg Tab Take 20 mg by mouth daily with dinner or evening meal.         Review of patient's allergies indicates:  No Known Allergies      CBC: No results for input(s): WBC, RBC, HGB, HCT, PLT, MCV, MCH, MCHC in the last 72 hours.    CMP: No results for input(s): NA, K, CL, CO2, BUN, CREATININE, GLU, MG, PHOS, CALCIUM, ALBUMIN, PROT, ALKPHOS, ALT, AST, BILITOT in the last 72 hours.    INR  No results for input(s): PT, INR, PROTIME, APTT in the last 72 hours.      Diagnostic Studies:    EKG:   Results for orders placed or performed during the hospital encounter of 08/27/19   EKG 12-lead    Collection Time: 08/27/19 11:09 AM    Narrative    Test Reason : I48.91,    Vent. Rate : 063 BPM     Atrial Rate : 063 BPM     P-R Int : 172 ms          QRS Dur : 086 ms      QT Int : 416 ms       P-R-T Axes : 061 057 034 degrees     QTc Int : 425 ms    Normal sinus rhythm  Normal ECG  When compared with ECG of 27-AUG-2019 06:20,  No significant change was found  Confirmed by MARYBEL MELGAR MD ([id number]) on 8/27/2019 3:57:25 PM    Referred By: MAXIMILIAN OCHOA           Confirmed By:MARYBEL MELGAR MD       TTE:  No results found for this or any previous visit.  No results found for: EF   No results found for this or any previous visit.    JOS:  Results for orders placed or performed during the  hospital encounter of 08/26/19   Transesophageal echo (JOS)   Result Value Ref Range    BSA 2.42 m2    Ao root annulus 2.40 cm    Sinus 3.50 cm    STJ 3.00 cm    TR Max Carlos Manuel 2.57 m/s    Triscuspid Valve Regurgitation Peak Gradient 26 mmHg    Narrative    · Normal left ventricular systolic function. The estimated ejection   fraction is 60%  · Mild right ventricular enlargement. Normal right ventricular systolic   function.  · Severe left atrial enlargement.  · Moderate right atrial enlargement.  · Mild tricuspid regurgitation with gradient of 26mmHg.  · No LA or BHUPENDRA thrombus.  · Secundum interatrial septal defect present (0.5 x 0.8cm) with left to   right shunting indicated by color flow Doppler. Normal pulmonary vein   drainage into LA          Stress Test:  No results found for this or any previous visit.       [facility]:  Results for orders placed during the hospital encounter of 08/26/19    Intra-Procedure Documentation    Narrative  JOS performed in the Invasive Lab  - See Procedure Log link below for nursing documentation  - See JOS order on Card Proc Tab for physician findings       PFT:  No results found for: FEV1, FVC, [id number], TLC, DLCO     ALLERGIES:   Review of patient's allergies indicates:  No Known Allergies  LDA:          Lines/Drains/Airways     None                Anesthesia Evaluation      Airway   Mallampati: II  TM distance: > 6 cm  Neck ROM: Normal ROM  Dental    (+) Intact    Pulmonary    (+) sleep apnea,   Cardiovascular   (+) dysrhythmias,     Rate: Normal    Neuro/Psych      GI/Hepatic/Renal      Endo/Other    Abdominal                         Pre-op Assessment    I have reviewed the Patient Summary Reports.     I have reviewed the Nursing Notes. I have reviewed the NPO Status.   I have reviewed the Medications.     Review of Systems  Anesthesia Hx:  No problems with previous Anesthesia  Denies Family Hx of Anesthesia complications.   Denies Personal Hx of Anesthesia complications.    Cardiovascular:   Dysrhythmias    Pulmonary:   Sleep Apnea    Renal/:  Renal/ Normal     Hepatic/GI:  Hepatic/GI Normal    Neurological:  Neurology Normal    Endocrine:  Endocrine Normal        Physical Exam  General: Well nourished    Airway:  Mallampati: II   Mouth Opening: Normal  TM Distance: > 6 cm  Tongue: Normal  Neck ROM: Normal ROM    Dental:  Intact    Chest/Lungs:  Normal Respiratory Rate    Heart:  Rate: Normal        Anesthesia Plan  Type of Anesthesia, risks & benefits discussed:    Anesthesia Type: Gen Natural Airway, MAC  Intra-op Monitoring Plan: Standard ASA Monitors  Post Op Pain Control Plan: multimodal analgesia and IV/PO Opioids PRN  Induction:  IV  Airway Plan: Direct, Post-Induction  Informed Consent: Informed consent signed with the Patient and all parties understand the risks and agree with anesthesia plan.  All questions answered. Patient consented to blood products? Yes  ASA Score: 3  Day of Surgery Review of History & Physical: H&P Update referred to the surgeon/provider.    Ready For Surgery From Anesthesia Perspective.     .

## 2023-07-12 NOTE — PLAN OF CARE
Received report from Ep lab. Patient s/p DCCV, AAOx3. VSS, no c/o pain or discomfort at this time, resp even and unlabored. Post procedure protocol reviewed with patient and patient's family. Understanding verbalized. Family members at bedside. Nurse call bell within reach. Will continue to monitor per post procedure protocol.

## 2023-07-12 NOTE — DISCHARGE SUMMARY
Discharge Summary  Electrophysiology       Admit Date: 7/12/2023    Discharge Date:  7/12/2023    Attending Physician: Erick Agustin MD    Discharge Physician: Colt Lima MD    Principal Diagnoses: <principal problem not specified>  Indication for Admission: Cardioversion or Defibrillation (N/A), ECHOCARDIOGRAM, TRANSESOPHAGEAL (N/A)    Discharged Condition: Good    Hospital Course:   Patient presented for outpatient JOS/DCCV. He was in atrial fibrillation on presentation. Underwent successful JOS/DCCV. Instructed to continue anticoagulation with xarelto. Patient was able to ambulate without difficulty. He was feeling well and anticipating discharge home today.     Of note, JOS with known small ASD, possibly contributing to his AF. Plan to readdress potential closure with patient.     Outpatient Plan:  - he is taking toprol 25mg BID at home. Transition to toprol 50mg qd    Diet: Cardiac diet    Activity: Ad funmilayo, wound care instructions provided    Disposition: Home or Self Care    Follow Up:   Follow-up Information       Erick Agustin MD Follow up in 1 month(s).    Specialties: Electrophysiology, Cardiology  Contact information:  [address] [place] [address] [id number] [phone]                             Discharge Medications:      Medication List        CHANGE how you take these medications      metoprolol succinate 50 MG 24 hr tablet  Commonly known as: TOPROL-XL  Take 1 tablet (50 mg total) by mouth once daily.  What changed:   medication strength  how much to take  additional instructions            CONTINUE taking these medications      finasteride 1 mg tablet  Commonly known as: PROPECIA     rivaroxaban 20 mg Tab  Commonly known as: XARELTO            ASK your doctor about these medications      aspirin 81 MG EC tablet  Commonly known as: ECOTRIN  Take 1 tablet (81 mg total) by mouth once daily.     metronidazole 1% 1 % Gel  Commonly known as: METROGEL               Where to Get Your Medications         These medications were sent to [facility] - [place], [place] - [address]  [address] [place] [address]      Phone: [phone]   metoprolol succinate 50 MG 24 hr tablet

## 2023-07-12 NOTE — ANESTHESIA POSTPROCEDURE EVALUATION
Anesthesia Post Evaluation    Patient: Juan Luis Guadalupe    Procedure(s) Performed: Procedure(s) (LRB):  Cardioversion or Defibrillation (N/A)  ECHOCARDIOGRAM, TRANSESOPHAGEAL (N/A)    Final Anesthesia Type: general      Patient location during evaluation: PACU  Patient participation: Yes- Able to Participate  Level of consciousness: awake and alert  Post-procedure vital signs: reviewed and stable  Pain management: adequate  Airway patency: patent    PONV status at discharge: No PONV  Anesthetic complications: no      Cardiovascular status: hemodynamically stable  Respiratory status: spontaneous ventilation  Follow-up not needed.          Vitals Value Taken Time   /75 07/12/23 1203   Temp 36.5 °C (97.7 °F) 07/12/23 1105   Pulse 58 07/12/23 1204   Resp 15 07/12/23 1204   SpO2 99 % 07/12/23 1204   Vitals shown include unvalidated device data.      No case tracking events are documented in the log.      Pain/Kimberly Score: Kimberly Score: 10 (7/12/2023 12:00 PM)

## 2023-07-12 NOTE — TRANSFER OF CARE
"Anesthesia Transfer of Care Note    Patient: Juan Luis Guadalupe    Procedure(s) Performed: Procedure(s) (LRB):  Cardioversion or Defibrillation (N/A)  ECHOCARDIOGRAM, TRANSESOPHAGEAL (N/A)    Patient location: PACU    Anesthesia Type: general    Transport from OR: Transported from OR on 2-3 L/min O2 by NC with adequate spontaneous ventilation    Post pain: adequate analgesia    Post assessment: no apparent anesthetic complications and tolerated procedure well    Post vital signs: stable    Level of consciousness: sedated and responds to stimulation    Nausea/Vomiting: no nausea/vomiting    Complications: none    Transfer of care protocol was followed      Last vitals:   Visit Vitals  /85 (BP Location: Left arm, Patient Position: Lying)   Pulse 88   Temp 36.6 °C (97.9 °F) (Temporal)   Resp 18   Ht 6' 1" (1.854 m)   Wt 115.7 kg (255 lb)   SpO2 98%   BMI 33.64 kg/m²     "

## 2023-08-09 ENCOUNTER — TELEPHONE (OUTPATIENT)
Dept: ELECTROPHYSIOLOGY | Facility: CLINIC | Age: 58
End: 2023-08-09
Payer: COMMERCIAL

## 2023-08-10 ENCOUNTER — OFFICE VISIT (OUTPATIENT)
Dept: ELECTROPHYSIOLOGY | Facility: CLINIC | Age: 58
End: 2023-08-10
Payer: COMMERCIAL

## 2023-08-10 DIAGNOSIS — Z79.01 CHRONIC ANTICOAGULATION: ICD-10-CM

## 2023-08-10 DIAGNOSIS — G47.33 OBSTRUCTIVE SLEEP APNEA SYNDROME: ICD-10-CM

## 2023-08-10 DIAGNOSIS — I48.4 ATYPICAL ATRIAL FLUTTER: ICD-10-CM

## 2023-08-10 DIAGNOSIS — I48.19 PERSISTENT ATRIAL FIBRILLATION: Primary | ICD-10-CM

## 2023-08-10 DIAGNOSIS — Q21.10 ASD (ATRIAL SEPTAL DEFECT): ICD-10-CM

## 2023-08-10 PROCEDURE — 99214 OFFICE O/P EST MOD 30 MIN: CPT | Mod: 95,,, | Performed by: NURSE PRACTITIONER

## 2023-08-10 PROCEDURE — 99214 PR OFFICE/OUTPT VISIT, EST, LEVL IV, 30-39 MIN: ICD-10-PCS | Mod: 95,,, | Performed by: NURSE PRACTITIONER

## 2023-08-10 PROCEDURE — 1159F MED LIST DOCD IN RCRD: CPT | Mod: CPTII,95,, | Performed by: NURSE PRACTITIONER

## 2023-08-10 PROCEDURE — 1159F PR MEDICATION LIST DOCUMENTED IN MEDICAL RECORD: ICD-10-PCS | Mod: CPTII,95,, | Performed by: NURSE PRACTITIONER

## 2023-08-10 NOTE — PROGRESS NOTES
Mr. Guadalupe is a patient of Dr. Agustin and was last seen in clinic 7/5/23.    Subjective:   Patient ID:  Juan Luis Guadalupe is a 58 y.o. male who presents for follow-up of Atrial fibrillation    The patient location is: [place]   The chief complaint leading to consultation is: Atrial fibrillation    Visit type: audiovisual    Face to Face time with patient: 20 minutes  35 minutes of total time spent on the encounter, which includes face to face time and non-face to face time preparing to see the patient (eg, review of tests), Obtaining and/or reviewing separately obtained history, Documenting clinical information in the electronic or other health record, Independently interpreting results (not separately reported) and communicating results to the patient/family/caregiver, or Care coordination (not separately reported).     Each patient to whom he or she provides medical services by telemedicine is:  (1) informed of the relationship between the physician and patient and the respective role of any other health care provider with respect to management of the patient; and (2) notified that he or she may decline to receive medical services by telemedicine and may withdraw from such care at any time.    Notes:     HPI: Mr. Guadalupe is a 58 y.o. male with a past medical history of AF, ASD, sleep apnea (on CPAP).    Background:  HPI 58 yo male with AF, ASD, sleep apnea (on CPAP).     Primary cardiologist is Dr. Marie.  Presented to PCP with fatigue, noted to be in atrial fibrillation 10/18.     Echo 9/20/18 normal biventricular structure and function mildly dilated RA and LA, sinus venosus ASD < 10 mm.  Placed on Xarelto.  DCCV >> was back in AF the next day     12/13/2018 PVI (RFA). Started on Propafenone.  On 12/17/2018 he was back in AF and underwent successful cardioversion.   Felt poorly on propafenone, which was discontinued 6/2019.   7/2019, developed recurrence. Xarelto restarted >> DCCV 7/8/2019. Rythmol resumed.      8/27/2019: Repeat PVI (LSPV and RIPV) ablation of atrial flutter (typical), posterior wall isolation. Propafenone discontinued.  Last seen in 2020. Was referred back to Interventional cardiology for ASD closure. Ultimately decision made to defer closure.     7/5/23 Mr. Guadalupe developed persistent recurrence, symptomatic (6/2023). Initially occurred in the setting of dehydration/increasing activity and challenges with CPAP. However, AF has persisted. Xarelto resumed. Taking in the morning. Placed on metoprolol and Propafenone. No DCCV. Has remained persistently out of rhythm. Propafenone discontinued after one week.  Echo 6/5/23 EF >60% normal RV size and function, mild MR, mildly dilated LA. JOS/DCCV scheduled.     7/12/23: Mr. Guadalupe underwent successful JOS/DCCV. Instructed to continue anticoagulation with xarelto. Of note, JOS with known small ASD. Continue toprol increased from 25 mg to 50 mg daily.    7/26/23: 1 week post DCCV ECG showed sinus bradycardia at 55 bmp  ms QRS 90 ms QT/Qtc 452/416    Update (08/11/2023):  Mr. Guadalupe presents today for AF 1 months s/p successful JOS/DCCV. He denies chest pain with exertion or at rest, palpitations, SOB, LUND, dizziness, or syncope. He is doing very well from an arrhythmia standpoint. He monitors for AF on his Apple Watch.    Mr. Guadalupe is currently taking Xarelto 20 mg daily for stroke prophylaxis and denies significant bleeding episodes. He is currently being treated with Metoprolol succinate 50 mg daily for HR control. Kidney function is stable, with a creatinine of 1.2 on 7/10/23.    I have personally reviewed the patient's EKG from 8/8/23, which shows sinus bradycardia at 55 bpm. WA interval is 174 ms. QRS interval is 86 ms. QT/QTc is 430/411 ms.    Recent Cardiac Tests:  JOS (7/12/23):  Atrial fibrillation observed.  Normal appearing left atrial appendage. No thrombus is present in the appendage. Abnormal appendage velocities 0.4m/s.  The estimated ejection  fraction is 60%.  The left ventricle is normal in size with normal systolic function.  Normal right ventricular size with normal right ventricular systolic function.  Moderate bi atrial enlargement.  Mild tricuspid regurgitation.  Secundum interatrial septal defect is present that is located anterosuperioly that is 0.5 with left to right shunting indicated by color flow Doppler. Overall appears unchaged from prior JOS. Consider full TTE for echocardiographic Qp/Qs if clinically applicable (not as accurate as angiographic study)  There is non-specific leaflet thickening of MV leaflets with no evidence of prolpase and trace MR.  Grade 1 plaque present in the transverse aorta.    Ablation (8/27/2019):  Successful pulmonary vein RF ablation.  Successful ablation of atrial flutter (typical).  Posterior wall isolation  3D mapping  Intracardiac echocardiography     Ablation (12/14/2018):  Successful pulmonary vein RF ablation.  External cardioversion    2D Echo (12/13/2018):  Normal left ventricular systolic function. The estimated ejection fraction is 63% on this limited non-Doppler echo.  No wall motion abnormalities.  Severe left atrial enlargement.  Indeterminate left ventricular diastolic function.  Low normal right ventricular systolic function.  Mild right ventricular enlargement.  Moderate right atrial enlargement.  Normal central venous pressure (3 mm Hg).  Trivial pericardial effusion.  Past Medical History:   Diagnosis Date    Anticoagulant long-term use     ASD (atrial septal defect)     Atrial fibrillation        Past Surgical History:   Procedure Laterality Date    ABLATION OF ARRHYTHMOGENIC FOCUS FOR ATRIAL FIBRILLATION N/A 12/14/2018    Procedure: ABLATION, ARRHYTHMOGENIC FOCUS, FOR ATRIAL FIBRILLATION;  Surgeon: Erick Agustin MD;  Location: [facility] EP LAB;  Service: Cardiology;  Laterality: N/A;  AF, PVI, RFA, JILLIAN, Gen, SK, 3prep    ABLATION OF ARRHYTHMOGENIC FOCUS FOR ATRIAL FIBRILLATION N/A 8/27/2019     Procedure: Ablation atrial fibrillation;  Surgeon: Erick Agustin MD;  Location: [facility] EP LAB;  Service: Cardiology;  Laterality: N/A;  AF, AFl (atypical), PVI (re-do), RFA, JILLIAN, Gen, SK, 3 Prep    CARDIOVERSION N/A 12/17/2018    Procedure: CARDIOVERSION;  Surgeon: Erick Agustin MD;  Location: [facility] EP LAB;  Service: Cardiology;  Laterality: N/A;  AF, DCCV, MAC, SK, 3prep    TRANSESOPHAGEAL ECHOCARDIOGRAPHY N/A 7/12/2023    Procedure: ECHOCARDIOGRAM, TRANSESOPHAGEAL;  Surgeon: [facility] Provider;  Location: [facility] EP LAB;  Service: Cardiology;  Laterality: N/A;    TREATMENT OF CARDIAC ARRHYTHMIA  12/14/2018    Procedure: Cardioversion or Defibrillation;  Surgeon: Erick Agustin MD;  Location: [facility] EP LAB;  Service: Cardiology;;    TREATMENT OF CARDIAC ARRHYTHMIA N/A 7/8/2019    Procedure: Cardioversion or Defibrillation;  Surgeon: Eirck Agustin MD;  Location: [facility] EP LAB;  Service: Cardiology;  Laterality: N/A;  AFl, DCCV, MAC, SK, 3 Prep    TREATMENT OF CARDIAC ARRHYTHMIA N/A 7/12/2023    Procedure: Cardioversion or Defibrillation;  Surgeon: Erick Agustin MD;  Location: [facility] EP LAB;  Service: Cardiology;  Laterality: N/A;  AF, JOS, DCCV, MAC, SK, 3 PREP       Current Outpatient Medications   Medication Sig    finasteride (PROPECIA) 1 mg tablet Take 1 mg by mouth once daily.    metoprolol succinate (TOPROL-XL) 50 MG 24 hr tablet Take 1 tablet (50 mg total) by mouth once daily.    rivaroxaban (XARELTO) 20 mg Tab Take 20 mg by mouth daily with dinner or evening meal.    aspirin (ECOTRIN) 81 MG EC tablet Take 1 tablet (81 mg total) by mouth once daily. (Patient not taking: Reported on 7/5/2023)    metronidazole 1% (METROGEL) 1 % Gel      No current facility-administered medications for this visit.     ROS: Mr. Guadalupe denies any chest pain, palpitations, SOB, LUND, dizziness, light headedness, weakness, syncope, near syncopal episodes or bleeding.    Objective:     - N/A telemedicine visit    Lab Results   Component Value  Date     04/08/2022     12/17/2018    K 4.7 04/08/2022    K 4.3 12/17/2018    BUN 22 04/08/2022    BUN 17 12/17/2018    CREATININE 1.22 04/08/2022    CREATININE 1.3 12/17/2018    HGB 15.3 07/10/2023    HGB 15.8 12/13/2018    HCT 45.2 07/10/2023    HCT 46.2 12/13/2018    LDLCALC 112 (H) 04/08/2022     Recent Labs   Lab 07/10/23  1309   INR 1.10       Assessment:     1. Persistent atrial fibrillation    2. Atypical atrial flutter    3. ASD (atrial septal defect)    4. Obstructive sleep apnea syndrome    5. Chronic anticoagulation      Plan:   In summary, Mr. Guadalupe is a 58 y.o. male with a past medical history of  AF, ASD, sleep apnea (on CPAP). He presents today for follow up of  AF 1 months s/p successful JOS/DCCV. He is doing very well from an arrhythmia standpoint. Today, he remains in sinus rhythm.    He is currently taking Xarelto 20 mg daily for stroke prophylaxis and Metoprolol succinate 50 mg daily for HR control.    Plan:  -Continue home medications  -Continue to monitor for recurrence of AF on Apple Watch  -Follow up in about 3 months, sooner as needed.    ATUL Kenny, ISABELP-C  Cardiology-Electrophysiology/Arrhythmia NP   [facility]   ------------------------------------------------------------------  *A copy of this note has been sent to Dr. Agustin*

## 2023-11-13 ENCOUNTER — TELEPHONE (OUTPATIENT)
Dept: ELECTROPHYSIOLOGY | Facility: CLINIC | Age: 58
End: 2023-11-13
Payer: COMMERCIAL

## 2023-11-13 NOTE — TELEPHONE ENCOUNTER
----- Message from Laura Gaytan MA sent at 11/13/2023  9:47 AM CST -----  Regarding: FW: Afib    ----- Message -----  From: Raiza Leo  Sent: 11/13/2023   9:43 AM CST  To: [facility] Staff  Subject: Afib                                             Patient calling to speak with staff because he's in afib, and want to know if he need to come in or see a doctor in [place]. Please call back @ [phone]. Thank you Raiza

## 2023-11-13 NOTE — TELEPHONE ENCOUNTER
Spoke with patient. He has been in AF since yesterday. -120'S. Confirmed compliance with Xarelto. Not symptomatic, just noticed the watch alert. Will take a supplemental dose of metoprolol now (25mg) to see if that will slow down his HR enough to convert. He will call me back this afternoon to update us. He does have a cardiologist in [place].

## 2023-11-21 ENCOUNTER — TELEPHONE (OUTPATIENT)
Dept: ELECTROPHYSIOLOGY | Facility: CLINIC | Age: 58
End: 2023-11-21
Payer: COMMERCIAL

## 2023-11-21 NOTE — TELEPHONE ENCOUNTER
Spoke with patient. He saw his local cardiologist and remains in AF. The did offer DCCV but also asked him why he didn't want to just stay in AF since he is asymptomatic. He would like to hear from EP what the best option would be. Will ask MA team to reach out and schedule virtual visit (he will drive to [place]) with Diana to discuss options.

## 2023-11-21 NOTE — TELEPHONE ENCOUNTER
Can you please reach out to patient to schedule virtual visit with Diana to discuss options now that he is in AF again? He lives in [place], but will drive to [place] for the virtual visit.   Thanks

## 2023-11-21 NOTE — TELEPHONE ENCOUNTER
----- Message from Misty Friedman MA sent at 11/21/2023  3:44 PM CST -----  The patient would like to talk to  the nurse about his procedure please call [phone]. Thank you.

## 2023-11-21 NOTE — TELEPHONE ENCOUNTER
----- Message from Misty Friedman MA sent at 11/21/2023  3:44 PM CST -----  The patient would like to talk to  the nurse about his procedure please call [phone]. Thank you.

## 2023-11-22 NOTE — PLAN OF CARE
"Vss. sats 96% on room air.  sb to sr noted on cm.  Tele box on 315 sscu rn.  Pt with silvana groin sutures figure 8 in place, x1 each groin.  Placed at 1030, removal time 1430, bedrest done at 1630.  Palpable pulses noted. No hematoma or drainage to silvana groins, ashwini, well approx.  Rhodes in place from ep procedure, secured with noe. 1050 urine noted clear yellow urine.  Pt tolerated lasix ivp x1 20mg per md order.  Prn pain meds given iv and po per md order. Pt with "mild discomfort to silvana groins".  Pt tolerable upon transfer to sscu 315.  Pt's wife re updated of new room number, verbalizes understanding. See flowsheet for full assessment.   " Closure 3 Information: This tab is for additional flaps and grafts above and beyond our usual structured repairs.  Please note if you enter information here it will not currently bill and you will need to add the billing information manually.

## 2023-11-30 NOTE — PROGRESS NOTES
Mr. Guadalupe is a patient of Dr. Agustin and was last seen in clinic 8/10/2023 (telemed).      Subjective:   Patient ID:  Juan Luis Guadalupe is a 58 y.o. male who presents for follow up of Atrial Fibrillation  .     The patient location is: [place] ([place])  The chief complaint leading to consultation is: atrial fibrillation  Visit type: audiovisual  Face to Face time with patient: 40  45 minutes of total time spent on the encounter, which includes face to face time and non-face to face time preparing to see the patient (eg, review of tests), Obtaining and/or reviewing separately obtained history, Documenting clinical information in the electronic or other health record, Independently interpreting results (not separately reported) and communicating results to the patient/family/caregiver, or Care coordination (not separately reported).   Each patient to whom he or she provides medical services by telemedicine is:  (1) informed of the relationship between the physician and patient and the respective role of any other health care provider with respect to management of the patient; and (2) notified that he or she may decline to receive medical services by telemedicine and may withdraw from such care at any time.    Notes:     Mr. Guadalupe is a 58 y.o. male with a past medical history of AF (PVI 2018, 2019), ASD, sleep apnea (on CPAP) with a telemedicine visit for follow up.    Background:    Primary cardiologist is Dr. Marie.  Presented to PCP with fatigue, noted to be in atrial fibrillation 10/18.     Echo 9/20/18 normal biventricular structure and function mildly dilated RA and LA, sinus venosus ASD < 10 mm.  Placed on Xarelto.  DCCV >> was back in AF the next day     12/13/2018 PVI (RFA). Started on Propafenone.  On 12/17/2018 he was back in AF and underwent successful cardioversion.   Felt poorly on propafenone, which was discontinued 6/2019.   7/2019, developed recurrence. Xarelto restarted >> DCCV 7/8/2019. Rythmol  resumed.     8/27/2019: Repeat PVI (LSPV and RIPV) ablation of atrial flutter (typical), posterior wall isolation. Propafenone discontinued.  Last seen in 2020. Was referred back to Interventional cardiology for ASD closure. Ultimately decision made to defer closure.     7/5/23 Mr. Guadalupe developed persistent recurrence, symptomatic (6/2023). Initially occurred in the setting of dehydration/increasing activity and challenges with CPAP. However, AF has persisted. Xarelto resumed. Taking in the morning. Placed on metoprolol and Propafenone. No DCCV. Has remained persistently out of rhythm. Propafenone discontinued after one week.  Echo 6/5/23 EF >60% normal RV size and function, mild MR, mildly dilated LA. JOS/DCCV scheduled.     7/12/23: Mr. Guadalupe underwent successful JOS/DCCV. Instructed to continue anticoagulation with xarelto. Of note, JOS with known small ASD. Continue toprol increased from 25 mg to 50 mg daily.    7/26/23: 1 week post DCCV ECG showed sinus bradycardia at 55 bmp  ms QRS 90 ms QT/Qtc 452/416    8/1/2023: Mr. Guadalupe presents today for AF 1 months s/p successful JOS/DCCV.  He is doing very well from an arrhythmia standpoint. He monitors for AF on his Apple Watch.     Update (12/05/2023):    11/21/2023: He saw his local cardiologist and remains in AF. The did offer DCCV but also asked him why he didn't want to just stay in AF since he is asymptomatic. He would like to hear from EP what the best option would be. Will ask MA team to reach out and schedule virtual visit (he will drive to [place]) with Diana to discuss options.    Today he says he went into AF this time after 2 beers. Smart watch identified that he was out of rhythm but he noticed his HR was fast. When HR is fast he feels headaches and one time broke into a sweat when getting up out of his chair to get a drink of water. HRs higher before he takes his metoprolol which then improves his rates. No palpitations, CP, syncope  reported.    He is currently taking xarelto 20mg daily for stroke prophylaxis and denies significant bleeding episodes. He is currently being treated with toprol 50mg daily for HR control.  Kidney function is stable, with a creatinine of 1.22 on 4/8/2022.    Recent Cardiac Tests:    JOS (7/12/23):  Atrial fibrillation observed.  Normal appearing left atrial appendage. No thrombus is present in the appendage. Abnormal appendage velocities 0.4m/s.  The estimated ejection fraction is 60%.  The left ventricle is normal in size with normal systolic function.  Normal right ventricular size with normal right ventricular systolic function.  Moderate bi atrial enlargement.  Mild tricuspid regurgitation.  Secundum interatrial septal defect is present that is located anterosuperioly that is 0.5 with left to right shunting indicated by color flow Doppler. Overall appears unchaged from prior JOS. Consider full TTE for echocardiographic Qp/Qs if clinically applicable (not as accurate as angiographic study)  There is non-specific leaflet thickening of MV leaflets with no evidence of prolpase and trace MR.  Grade 1 plaque present in the transverse aorta.    Current Outpatient Medications   Medication Sig    aspirin (ECOTRIN) 81 MG EC tablet Take 1 tablet (81 mg total) by mouth once daily. (Patient not taking: Reported on 7/5/2023)    finasteride (PROPECIA) 1 mg tablet Take 1 mg by mouth once daily.    metoprolol succinate (TOPROL-XL) 50 MG 24 hr tablet Take 1 tablet (50 mg total) by mouth once daily.    rivaroxaban (XARELTO) 20 mg Tab Take 20 mg by mouth daily with dinner or evening meal.     No current facility-administered medications for this visit.       Review of Systems   Constitutional: Negative for malaise/fatigue.   Cardiovascular:  Positive for irregular heartbeat. Negative for chest pain, dyspnea on exertion, leg swelling and palpitations.   Respiratory:  Negative for shortness of breath.    Hematologic/Lymphatic:  Negative for bleeding problem.   Skin:  Negative for rash.   Musculoskeletal:  Negative for myalgias.   Gastrointestinal:  Negative for hematemesis, hematochezia and nausea.   Genitourinary:  Negative for hematuria.   Neurological:  Positive for headaches.   Psychiatric/Behavioral:  Negative for altered mental status.    Allergic/Immunologic: Negative for persistent infections.       Objective:          /78   Pulse 81   Wt 117 kg (258 lb)   BMI 34.04 kg/m²   - N/A telemedicine visit    Physical Exam  - N/A telemedicine visit    Lab Results   Component Value Date     04/08/2022     12/17/2018    K 4.7 04/08/2022    K 4.3 12/17/2018    BUN 22 04/08/2022    BUN 17 12/17/2018    CREATININE 1.22 04/08/2022    CREATININE 1.3 12/17/2018    HGB 15.8 12/13/2018    HCT 46.2 12/13/2018    LDLCALC 112 (H) 04/08/2022       Recent Labs   Lab 07/10/23  1309   INR 1.10       Assessment:     1. Persistent atrial fibrillation    2. Atypical atrial flutter    3. Chronic anticoagulation    4. Obstructive sleep apnea syndrome        Plan:     In summary, Mr. Guadalupe is a 58 y.o. male with a past medical history of AF (PVI 2018, 2019), ASD, sleep apnea (on CPAP) with a telemedicine visit for follow up.  Pt back in persistent AF. Per pt, rates range from 60s to 120s. Mildly symptomatic with symptoms that improve when his rates are controlled.  He has a hx of PVI x 2 and has not tolerated propafenone in the past. Options for rhythm control include another ablation (understanding diminishing returns) or AAD. Alternatively can try rate control if he is indeed asymptomatic and rates can be controlled. JOS prior to his last DCCV in July showed normal LVEF. He is interested in rhythm control at this time. We discussed tachy-chema syndrome. Options include tikosyn if QT is acceptable(requiring hospitalization) or multaq. He would like to try multaq. (May need to stop metoprolol if starting multaq to avoid worsening  bradycardia). He remains on xarelto for CVA prophylaxis.    JOS/DCCV+multaq (confirm with Dr. Agustin) -UPDATE: Will proceed  Continue current meds  RTC as scheduled following procedure    *A copy of this note has been sent to Dr. Agustin*    Follow up as scheduled following procedure.    ------------------------------------------------------------------    ATUL Arevalo, NP-C  Cardiac Electrophysiology

## 2023-12-05 ENCOUNTER — OFFICE VISIT (OUTPATIENT)
Dept: ELECTROPHYSIOLOGY | Facility: CLINIC | Age: 58
End: 2023-12-05
Payer: COMMERCIAL

## 2023-12-05 VITALS
WEIGHT: 258 LBS | SYSTOLIC BLOOD PRESSURE: 120 MMHG | DIASTOLIC BLOOD PRESSURE: 78 MMHG | BODY MASS INDEX: 34.04 KG/M2 | HEART RATE: 81 BPM

## 2023-12-05 DIAGNOSIS — Z79.01 CHRONIC ANTICOAGULATION: ICD-10-CM

## 2023-12-05 DIAGNOSIS — I48.19 PERSISTENT ATRIAL FIBRILLATION: Primary | ICD-10-CM

## 2023-12-05 DIAGNOSIS — G47.33 OBSTRUCTIVE SLEEP APNEA SYNDROME: ICD-10-CM

## 2023-12-05 DIAGNOSIS — I48.4 ATYPICAL ATRIAL FLUTTER: ICD-10-CM

## 2023-12-05 PROCEDURE — 1160F RVW MEDS BY RX/DR IN RCRD: CPT | Mod: CPTII,95,, | Performed by: NURSE PRACTITIONER

## 2023-12-05 PROCEDURE — 3074F SYST BP LT 130 MM HG: CPT | Mod: CPTII,95,, | Performed by: NURSE PRACTITIONER

## 2023-12-05 PROCEDURE — 1159F PR MEDICATION LIST DOCUMENTED IN MEDICAL RECORD: ICD-10-PCS | Mod: CPTII,95,, | Performed by: NURSE PRACTITIONER

## 2023-12-05 PROCEDURE — 99214 PR OFFICE/OUTPT VISIT, EST, LEVL IV, 30-39 MIN: ICD-10-PCS | Mod: 95,,, | Performed by: NURSE PRACTITIONER

## 2023-12-05 PROCEDURE — 1159F MED LIST DOCD IN RCRD: CPT | Mod: CPTII,95,, | Performed by: NURSE PRACTITIONER

## 2023-12-05 PROCEDURE — 3078F DIAST BP <80 MM HG: CPT | Mod: CPTII,95,, | Performed by: NURSE PRACTITIONER

## 2023-12-05 PROCEDURE — 99214 OFFICE O/P EST MOD 30 MIN: CPT | Mod: 95,,, | Performed by: NURSE PRACTITIONER

## 2023-12-05 PROCEDURE — 3008F BODY MASS INDEX DOCD: CPT | Mod: CPTII,95,, | Performed by: NURSE PRACTITIONER

## 2023-12-05 PROCEDURE — 3074F PR MOST RECENT SYSTOLIC BLOOD PRESSURE < 130 MM HG: ICD-10-PCS | Mod: CPTII,95,, | Performed by: NURSE PRACTITIONER

## 2023-12-05 PROCEDURE — 3078F PR MOST RECENT DIASTOLIC BLOOD PRESSURE < 80 MM HG: ICD-10-PCS | Mod: CPTII,95,, | Performed by: NURSE PRACTITIONER

## 2023-12-05 PROCEDURE — 3008F PR BODY MASS INDEX (BMI) DOCUMENTED: ICD-10-PCS | Mod: CPTII,95,, | Performed by: NURSE PRACTITIONER

## 2023-12-05 PROCEDURE — 1160F PR REVIEW ALL MEDS BY PRESCRIBER/CLIN PHARMACIST DOCUMENTED: ICD-10-PCS | Mod: CPTII,95,, | Performed by: NURSE PRACTITIONER

## 2023-12-05 RX ORDER — METOPROLOL TARTRATE 25 MG/1
25 TABLET, FILM COATED ORAL 2 TIMES DAILY
Status: ON HOLD | COMMUNITY
Start: 2023-11-08 | End: 2023-12-18 | Stop reason: HOSPADM

## 2023-12-05 NOTE — Clinical Note
Pt with hx of AF (PVI 2018, redo 2019), s/p DCCV this July, back in persistent AF (confirmed by outside cardiology).  Slightly symptomatic and he would like rhythm control. Not on AAD. Would you be ok with starting multaq after cardioversion? (He did not tolerate propafenone and would like to avoid tikosyn admission). thx

## 2023-12-06 DIAGNOSIS — I48.19 PERSISTENT ATRIAL FIBRILLATION: Primary | ICD-10-CM

## 2023-12-15 ENCOUNTER — TELEPHONE (OUTPATIENT)
Dept: ELECTROPHYSIOLOGY | Facility: CLINIC | Age: 58
End: 2023-12-15
Payer: COMMERCIAL

## 2023-12-15 NOTE — TELEPHONE ENCOUNTER
Spoke to patient    CONFIRMED procedure arrival time of 8am on 12/18/23 for JOS/DCCV    Reiterated instructions including:  -Directions to check in desk  -NPO after midnight night prior to procedure  -Confirmed compliance of Xarelto, confirmed that he takes with evening meal and will take Sunday evening  -Pre-procedure LABS reviewed (scanned to Media), no alerts noted  -Confirmed no fever, cough, or shortness of breath in the past 30 days  -Reviewed current visitor policy    Patient verbalized understanding of above and appreciated the call.

## 2023-12-18 ENCOUNTER — HOSPITAL ENCOUNTER (OUTPATIENT)
Dept: CARDIOLOGY | Facility: HOSPITAL | Age: 58
Discharge: HOME | End: 2023-12-18
Attending: INTERNAL MEDICINE | Admitting: INTERNAL MEDICINE
Payer: COMMERCIAL

## 2023-12-18 ENCOUNTER — HOSPITAL ENCOUNTER (OUTPATIENT)
Facility: HOSPITAL | Age: 58
Discharge: HOME | End: 2023-12-18
Attending: INTERNAL MEDICINE | Admitting: INTERNAL MEDICINE
Payer: COMMERCIAL

## 2023-12-18 ENCOUNTER — ANESTHESIA (OUTPATIENT)
Dept: MEDSURG UNIT | Facility: HOSPITAL | Age: 58
End: 2023-12-18
Payer: COMMERCIAL

## 2023-12-18 ENCOUNTER — ANESTHESIA EVENT (OUTPATIENT)
Dept: MEDSURG UNIT | Facility: HOSPITAL | Age: 58
End: 2023-12-18
Payer: COMMERCIAL

## 2023-12-18 VITALS
BODY MASS INDEX: 34.19 KG/M2 | DIASTOLIC BLOOD PRESSURE: 74 MMHG | TEMPERATURE: 97 F | RESPIRATION RATE: 21 BRPM | WEIGHT: 258 LBS | OXYGEN SATURATION: 98 % | HEART RATE: 55 BPM | SYSTOLIC BLOOD PRESSURE: 113 MMHG | HEIGHT: 73 IN

## 2023-12-18 VITALS
SYSTOLIC BLOOD PRESSURE: 138 MMHG | HEIGHT: 73 IN | BODY MASS INDEX: 34.19 KG/M2 | WEIGHT: 258 LBS | DIASTOLIC BLOOD PRESSURE: 82 MMHG

## 2023-12-18 DIAGNOSIS — I49.9 ARRHYTHMIA: ICD-10-CM

## 2023-12-18 DIAGNOSIS — I48.19 PERSISTENT ATRIAL FIBRILLATION: ICD-10-CM

## 2023-12-18 LAB — BSA FOR ECHO PROCEDURE: 2.45 M2

## 2023-12-18 PROCEDURE — D9220A PRA ANESTHESIA: ICD-10-PCS | Mod: ANES,,, | Performed by: ANESTHESIOLOGY

## 2023-12-18 PROCEDURE — D9220A PRA ANESTHESIA: ICD-10-PCS | Mod: CRNA,,, | Performed by: NURSE ANESTHETIST, CERTIFIED REGISTERED

## 2023-12-18 PROCEDURE — 93312 TRANSESOPHAGEAL ECHO (TEE) (CUPID ONLY): ICD-10-PCS | Mod: 26,,, | Performed by: INTERNAL MEDICINE

## 2023-12-18 PROCEDURE — 93010 ELECTROCARDIOGRAM REPORT: CPT | Mod: ,,, | Performed by: INTERNAL MEDICINE

## 2023-12-18 PROCEDURE — 93005 ELECTROCARDIOGRAM TRACING: CPT

## 2023-12-18 PROCEDURE — 93325 TRANSESOPHAGEAL ECHO (TEE) (CUPID ONLY): ICD-10-PCS | Mod: 26,,, | Performed by: INTERNAL MEDICINE

## 2023-12-18 PROCEDURE — 93325 DOPPLER ECHO COLOR FLOW MAPG: CPT | Mod: 26,,, | Performed by: INTERNAL MEDICINE

## 2023-12-18 PROCEDURE — 93320 DOPPLER ECHO COMPLETE: CPT | Mod: 26,,, | Performed by: INTERNAL MEDICINE

## 2023-12-18 PROCEDURE — 93010 EKG 12-LEAD: ICD-10-PCS | Mod: ,,, | Performed by: INTERNAL MEDICINE

## 2023-12-18 PROCEDURE — 37000008 HC ANESTHESIA 1ST 15 MINUTES: Performed by: INTERNAL MEDICINE

## 2023-12-18 PROCEDURE — 37000009 HC ANESTHESIA EA ADD 15 MINS: Performed by: INTERNAL MEDICINE

## 2023-12-18 PROCEDURE — 93005 ELECTROCARDIOGRAM TRACING: CPT | Mod: 59

## 2023-12-18 PROCEDURE — 93325 DOPPLER ECHO COLOR FLOW MAPG: CPT

## 2023-12-18 PROCEDURE — 63600175 PHARM REV CODE 636 W HCPCS: Performed by: NURSE ANESTHETIST, CERTIFIED REGISTERED

## 2023-12-18 PROCEDURE — D9220A PRA ANESTHESIA: Mod: ANES,,, | Performed by: ANESTHESIOLOGY

## 2023-12-18 PROCEDURE — 92960 CARDIOVERSION ELECTRIC EXT: CPT | Mod: ,,, | Performed by: INTERNAL MEDICINE

## 2023-12-18 PROCEDURE — 92960 CARDIOVERSION ELECTRIC EXT: CPT | Performed by: INTERNAL MEDICINE

## 2023-12-18 PROCEDURE — 93320 TRANSESOPHAGEAL ECHO (TEE) (CUPID ONLY): ICD-10-PCS | Mod: 26,,, | Performed by: INTERNAL MEDICINE

## 2023-12-18 PROCEDURE — 93312 ECHO TRANSESOPHAGEAL: CPT | Mod: 26,,, | Performed by: INTERNAL MEDICINE

## 2023-12-18 PROCEDURE — 25000003 PHARM REV CODE 250: Performed by: NURSE ANESTHETIST, CERTIFIED REGISTERED

## 2023-12-18 PROCEDURE — D9220A PRA ANESTHESIA: Mod: CRNA,,, | Performed by: NURSE ANESTHETIST, CERTIFIED REGISTERED

## 2023-12-18 PROCEDURE — 92960 PR CARDIOVERSION, ELECTIVE;EXTERN: ICD-10-PCS | Mod: ,,, | Performed by: INTERNAL MEDICINE

## 2023-12-18 RX ORDER — LIDOCAINE HYDROCHLORIDE 20 MG/ML
INJECTION INTRAVENOUS
Status: DISCONTINUED | OUTPATIENT
Start: 2023-12-18 | End: 2023-12-18

## 2023-12-18 RX ORDER — FENTANYL CITRATE 50 UG/ML
25 INJECTION, SOLUTION INTRAMUSCULAR; INTRAVENOUS EVERY 5 MIN PRN
Status: DISCONTINUED | OUTPATIENT
Start: 2023-12-18 | End: 2023-12-18 | Stop reason: HOSPADM

## 2023-12-18 RX ORDER — ONDANSETRON 2 MG/ML
4 INJECTION INTRAMUSCULAR; INTRAVENOUS DAILY PRN
Status: DISCONTINUED | OUTPATIENT
Start: 2023-12-18 | End: 2023-12-18 | Stop reason: HOSPADM

## 2023-12-18 RX ORDER — DRONEDARONE 400 MG/1
400 TABLET, FILM COATED ORAL 2 TIMES DAILY WITH MEALS
Qty: 180 TABLET | Refills: 3 | Status: SHIPPED | OUTPATIENT
Start: 2023-12-18 | End: 2024-02-15

## 2023-12-18 RX ORDER — PROPOFOL 10 MG/ML
VIAL (ML) INTRAVENOUS
Status: DISCONTINUED | OUTPATIENT
Start: 2023-12-18 | End: 2023-12-18

## 2023-12-18 RX ADMIN — LIDOCAINE HYDROCHLORIDE 100 MG: 20 INJECTION INTRAVENOUS at 10:12

## 2023-12-18 RX ADMIN — PROPOFOL 40 MG: 10 INJECTION, EMULSION INTRAVENOUS at 10:12

## 2023-12-18 RX ADMIN — PROPOFOL 70 MG: 10 INJECTION, EMULSION INTRAVENOUS at 10:12

## 2023-12-18 RX ADMIN — SODIUM CHLORIDE: 0.9 INJECTION, SOLUTION INTRAVENOUS at 10:12

## 2023-12-18 NOTE — PLAN OF CARE
Received report from CHRIS Pinon. Patient s/p JOS/DCCV, AAOx3. VSS, no signs of pain or discomfort at this time, resp even and unlabored. Oral airway in place.

## 2023-12-18 NOTE — ANESTHESIA PREPROCEDURE EVALUATION
12/18/2023  Juan Luis Guadalupe is a 58 y.o., male.  Pre-operative evaluation for Procedure(s) (LRB):  Cardioversion or Defibrillation (N/A)  Transesophageal echo (JOS) intra-procedure log documentation (N/A)    Juan Luis Guadalupe is a 58 y.o. male   .5 cm secundum ASD    Patient Active Problem List   Diagnosis    Persistent atrial fibrillation    ASD (atrial septal defect)    Tongue lesion    Chronic anticoagulation    Obstructive sleep apnea syndrome    Atypical atrial flutter       Past Surgical History:   Procedure Laterality Date    ABLATION OF ARRHYTHMOGENIC FOCUS FOR ATRIAL FIBRILLATION N/A 12/14/2018    Procedure: ABLATION, ARRHYTHMOGENIC FOCUS, FOR ATRIAL FIBRILLATION;  Surgeon: Erick Agustin MD;  Location: [facility];  Service: Cardiology;  Laterality: N/A;  AF, PVI, RFA, JILLIAN, Gen, SK, 3prep    ABLATION OF ARRHYTHMOGENIC FOCUS FOR ATRIAL FIBRILLATION N/A 8/27/2019    Procedure: Ablation atrial fibrillation;  Surgeon: Erick Agustin MD;  Location: [facility];  Service: Cardiology;  Laterality: N/A;  AF, AFl (atypical), PVI (re-do), RFA, JILLIAN, Gen, SK, 3 Prep    CARDIOVERSION N/A 12/17/2018    Procedure: CARDIOVERSION;  Surgeon: Erick Agustin MD;  Location: [facility];  Service: Cardiology;  Laterality: N/A;  AF, DCCV, MAC, SK, 3prep    TRANSESOPHAGEAL ECHOCARDIOGRAPHY N/A 7/12/2023    Procedure: ECHOCARDIOGRAM, TRANSESOPHAGEAL;  Surgeon: [facility];  Location: [facility];  Service: Cardiology;  Laterality: N/A;    TREATMENT OF CARDIAC ARRHYTHMIA  12/14/2018    Procedure: Cardioversion or Defibrillation;  Surgeon: Erick Agustin MD;  Location: [facility];  Service: Cardiology;;    TREATMENT OF CARDIAC ARRHYTHMIA N/A 7/8/2019    Procedure: Cardioversion or Defibrillation;  Surgeon: Erick Agustin MD;  Location: [facility];  Service: Cardiology;  Laterality: N/A;  AFl, DCCV, MAC, SK, 3 Prep     TREATMENT OF CARDIAC ARRHYTHMIA N/A 7/12/2023    Procedure: Cardioversion or Defibrillation;  Surgeon: Erick Agustin MD;  Location: [facility];  Service: Cardiology;  Laterality: N/A;  AF, JOS, DCCV, MAC, SK, 3 PREP       Social History     Socioeconomic History    Marital status: Single   Tobacco Use    Smoking status: Never    Smokeless tobacco: Never   Substance and Sexual Activity    Alcohol use: Not Currently     Alcohol/week: 2.0 standard drinks of alcohol     Types: 2 Glasses of wine per week     Comment: occassional    Drug use: No     Social Determinants of Health     Financial Resource Strain: Low Risk  (12/4/2023)    Overall Financial Resource Strain (CARDIA)     Difficulty of Paying Living Expenses: Not hard at all   Food Insecurity: No Food Insecurity (12/4/2023)    Hunger Vital Sign     Worried About Running Out of Food in the Last Year: Never true     Ran Out of Food in the Last Year: Never true   Transportation Needs: No Transportation Needs (12/4/2023)    PRAPARE - Transportation     Lack of Transportation (Medical): No     Lack of Transportation (Non-Medical): No   Physical Activity: Sufficiently Active (12/4/2023)    Exercise Vital Sign     Days of Exercise per Week: 5 days     Minutes of Exercise per Session: 30 min   Stress: No Stress Concern Present (12/4/2023)    [employer] [place] [employer] - Occupational Stress Questionnaire     Feeling of Stress : Not at all   Social Connections: Unknown (12/4/2023)    Social Connection and Isolation Panel [NHANES]     Frequency of Communication with Friends and Family: More than three times a week     Frequency of Social Gatherings with Friends and Family: Once a week     Active Member of Clubs or Organizations: No     Attends Club or Organization Meetings: Never     Marital Status:    Housing Stability: Low Risk  (12/4/2023)    Housing Stability Vital Sign     Unable to Pay for Housing in the Last Year: No     Number of Places  "Lived in the Last Year: 1     Unstable Housing in the Last Year: No       No current facility-administered medications on file prior to encounter.     Current Outpatient Medications on File Prior to Encounter   Medication Sig Dispense Refill    aspirin (ECOTRIN) 81 MG EC tablet Take 1 tablet (81 mg total) by mouth once daily. (Patient not taking: Reported on 7/5/2023) 30 tablet 0    finasteride (PROPECIA) 1 mg tablet Take 1 mg by mouth once daily.  3    metoprolol succinate (TOPROL-XL) 50 MG 24 hr tablet Take 1 tablet (50 mg total) by mouth once daily. (Patient not taking: Reported on 12/5/2023) 30 tablet 11    metoprolol tartrate (LOPRESSOR) 25 MG tablet Take 25 mg by mouth 2 (two) times daily.      rivaroxaban (XARELTO) 20 mg Tab Take 20 mg by mouth daily with dinner or evening meal.         Review of patient's allergies indicates:  No Known Allergies      CBC: No results for input(s): "WBC", "RBC", "HGB", "HCT", "PLT", "MCV", "MCH", "MCHC" in the last 72 hours.    CMP: No results for input(s): "NA", "K", "CL", "CO2", "BUN", "CREATININE", "GLU", "MG", "PHOS", "CALCIUM", "ALBUMIN", "PROT", "ALKPHOS", "ALT", "AST", "BILITOT" in the last 72 hours.    INR  No results for input(s): "PT", "INR", "PROTIME", "APTT" in the last 72 hours.      Diagnostic Studies:    EKG:   Results for orders placed or performed during the hospital encounter of 07/12/23   EKG 12-LEAD    Collection Time: 07/12/23 11:26 AM    Narrative    Test Reason : I48.91,    Vent. Rate : 054 BPM     Atrial Rate : 054 BPM     P-R Int : 172 ms          QRS Dur : 088 ms      QT Int : 448 ms       P-R-T Axes : 067 028 020 degrees     QTc Int : 424 ms    Sinus bradycardia  Otherwise normal ECG  When compared with ECG of 12-JUL-2023 08:24,  Sinus rhythm has replaced Atrial fibrillation  Vent. rate has decreased BY  38 BPM  Confirmed by Juni WADDELL MD ([id number]) on 7/12/2023 1:08:38 PM    Referred By: MAXIMILIAN OCHOA           Confirmed By:Juni WADDELL MD       TTE:  No " "results found for this or any previous visit.  EF   Date Value Ref Range Status   07/12/2023 60 % Final      No results found for this or any previous visit.    JOS:  Results for orders placed or performed during the hospital encounter of 07/12/23   Transesophageal echo (JOS)   Result Value Ref Range    BSA 2.44 m2    EF 60 %    Ao root annulus 3.70 cm    STJ 3.10 cm    Ascending aorta 3.20 cm    Narrative    · Atrial fibrillation observed.  · Normal appearing left atrial appendage. No thrombus is present in the   appendage. Abnormal appendage velocities 0.4m/s.  · The estimated ejection fraction is 60%.  · The left ventricle is normal in size with normal systolic function.  · Normal right ventricular size with normal right ventricular systolic   function.  · Moderate bi atrial enlargement.  · Mild tricuspid regurgitation.  · Secundum interatrial septal defect is present that is located   anterosuperioly that is 0.5 with left to right shunting indicated by color   flow Doppler. Overall appears unchaged from prior JOS. Consider full TTE   for echocardiographic Qp/Qs if clinically applicable (not as accurate as   angiographic study)  · There is non-specific leaflet thickening of MV leaflets with no evidence   of prolpase and trace MR.  · Grade 1 plaque present in the transverse aorta.          Stress Test:  No results found for this or any previous visit.       LHC:  Results for orders placed during the hospital encounter of 08/26/19    Intra-Procedure Documentation    Narrative  JOS performed in the Invasive Lab  - See Procedure Log link below for nursing documentation  - See JOS order on Card Proc Tab for physician findings       PFT:  No results found for: "FEV1", "FVC", "GWF2XON", "TLC", "DLCO"     ALLERGIES:   Review of patient's allergies indicates:  No Known Allergies  LDA:          Lines/Drains/Airways       None                  Anesthesia Evaluation      Airway   Mallampati: II  TM distance: > 6 cm  Neck " ROM: Normal ROM  Dental    (+) Intact    Pulmonary    (+) sleep apnea  Cardiovascular     Rate: Normal    Neuro/Psych      GI/Hepatic/Renal      Endo/Other    Abdominal                           Pre-op Assessment    I have reviewed the Patient Summary Reports.     I have reviewed the Nursing Notes. I have reviewed the NPO Status.   I have reviewed the Medications.     Review of Systems  Anesthesia Hx:  No problems with previous Anesthesia             Denies Family Hx of Anesthesia complications.    Denies Personal Hx of Anesthesia complications.                    Cardiovascular:                    See HPI                         Pulmonary:        Sleep Apnea                Renal/:  Renal/ Normal                 Hepatic/GI:  Hepatic/GI Normal                 Neurological:  Neurology Normal                                      Endocrine:  Endocrine Normal                Physical Exam  General: Well nourished    Airway:  Mallampati: II   Mouth Opening: Normal  TM Distance: > 6 cm  Tongue: Normal  Neck ROM: Normal ROM    Dental:  Intact    Chest/Lungs:  Normal Respiratory Rate    Heart:  Rate: Normal        Anesthesia Plan  Type of Anesthesia, risks & benefits discussed:    Anesthesia Type: Gen Natural Airway, MAC  Intra-op Monitoring Plan: Standard ASA Monitors  Post Op Pain Control Plan: multimodal analgesia and IV/PO Opioids PRN  Induction:  IV  Airway Plan: Direct, Post-Induction  Informed Consent: Informed consent signed with the Patient and all parties understand the risks and agree with anesthesia plan.  All questions answered. Patient consented to blood products? Yes  ASA Score: 3  Day of Surgery Review of History & Physical: H&P Update referred to the surgeon/provider.    Ready For Surgery From Anesthesia Perspective.     .

## 2023-12-18 NOTE — PLAN OF CARE
Oral airway removed from pt. AAOx3. VSS, no c/o pain or discomfort at this time, resp even and unlabored. Post procedure protocol reviewed with patient and patient's family. Understanding verbalized. Nurse call bell within reach.

## 2023-12-18 NOTE — ANESTHESIA POSTPROCEDURE EVALUATION
Anesthesia Post Evaluation    Patient: Juan Luis Guadalupe    Procedure(s) Performed: Procedure(s) (LRB):  Cardioversion or Defibrillation (N/A)  Transesophageal echo (JOS) intra-procedure log documentation (N/A)    Final Anesthesia Type: general      Patient location during evaluation: PACU  Patient participation: Yes- Able to Participate  Level of consciousness: awake and alert  Post-procedure vital signs: reviewed and stable  Pain management: adequate  Airway patency: patent    PONV status at discharge: No PONV  Anesthetic complications: no      Cardiovascular status: hemodynamically stable  Respiratory status: spontaneous ventilation  Follow-up not needed.              Vitals Value Taken Time   /66 12/18/23 1133   Temp 36.2 °C (97.2 °F) 12/18/23 1110   Pulse 57 12/18/23 1209   Resp 16 12/18/23 1209   SpO2 99 % 12/18/23 1209   Vitals shown include unvalidated device data.      No case tracking events are documented in the log.      Pain/Kimberly Score: Kimberly Score: 10 (12/18/2023 12:00 PM)

## 2023-12-18 NOTE — TRANSFER OF CARE
"Anesthesia Transfer of Care Note    Patient: Juan Luis Guadalupe    Procedure(s) Performed: Procedure(s) (LRB):  Cardioversion or Defibrillation (N/A)  Transesophageal echo (JOS) intra-procedure log documentation (N/A)    Patient location: PACU    Anesthesia Type: general    Transport from OR: Transported from OR on 2-3 L/min O2 by NC with adequate spontaneous ventilation    Post pain: adequate analgesia    Post assessment: no apparent anesthetic complications and tolerated procedure well    Post vital signs: stable    Level of consciousness: sedated and responds to stimulation    Nausea/Vomiting: no nausea/vomiting    Complications: none    Transfer of care protocol was followed      Last vitals: Visit Vitals  /76 (BP Location: Left arm, Patient Position: Lying)   Pulse 76   Temp 36.6 °C (97.9 °F) (Temporal)   Resp 18   Ht 6' 1" (1.854 m)   Wt 117 kg (258 lb)   SpO2 98%   BMI 34.04 kg/m²     "

## 2023-12-18 NOTE — H&P
[facility]  Cardiology  JOS/DCCV History & Physical      Juan Luis Guadalupe  YOB: 1965  Medical Record Number:  [id number]  Attending Physician:  Erick Agustin MD   Date of Admission: 12/18/2023       Hospital Day:  0  Current Principal Problem:  Atrial fibrillation      History     Cc: JOS/DCCV for AF    HPI  Patient of Dr. Agustin. Last OV with Diana Matthews NP on 12/5/23.   58 y.o. male with a past medical history of AF (PVI 2018, 2019), ASD, sleep apnea (on CPAP) with a telemedicine visit for follow up.    Primary cardiologist is Dr. Marie.  Presented to PCP with fatigue, noted to be in atrial fibrillation 10/18.  Placed on Xarelto. DCCV >> was back in AF the next day.     12/13/2018: PVI (RFA), Started on Propafenone.  On 12/17/2018 he was back in AF and underwent successful cardioversion.   Felt poorly on propafenone, which was discontinued 6/2019.   7/2019: developed recurrence. Xarelto restarted >> DCCV 7/8/2019. Rythmol resumed.  8/27/2019: Repeat PVI (LSPV and RIPV) ablation of atrial flutter (typical), posterior wall isolation. Propafenone discontinued.    Was referred back to Interventional cardiology for ASD closure. Ultimately decision made to defer closure.     7/5/23: Mr. Guadalupe developed persistent recurrence, symptomatic (6/2023). Initially occurred in the setting of dehydration/increasing activity and challenges with CPAP. However, AF has persisted. Xarelto resumed. \Placed on metoprolol and Propafenone. No DCCV. Propafenone discontinued after one week.  7/12/23: Mr. Guadalupe underwent successful JOS/DCCV. Instructed to continue anticoagulation with xarelto. Of note, JOS with known small ASD. Continue toprol increased from 25 mg to 50 mg daily.     Update (12/05/2023):  11/21/2023: He saw his local cardiologist and remains in AF. The did offer DCCV but also asked him why he didn't want to just stay in AF since he is asymptomatic.     Today, here for  JOS/DCCV. Feeling well, no complaints. Wife at bedside.    Rate/rhythm control: metoprolol tartrate 25mg BID  Anticoagulant/antiplatelets: xarelto, aspirin  ECG: AF rate 75  Platelet count: 186  INR: 1.02    History of stroke:  no  Dysphagia or odynophagia:  no  Liver Disease, esophageal disease, or known varices:  no  Upper GI Bleeding:  no  Snoring:  yes   Sleep Apnea:  yes - on CPAP  Prior neck surgery or radiation:  no  History of anesthetic difficulties:  no  Family history of anesthetic difficulties:  no  Last oral intake: last pm   Able to move neck in all directions:  yes  GLP-1 Use: no        Medications - Outpatient  Prior to Admission medications    Medication Sig Start Date End Date Taking? Authorizing Provider   aspirin (ECOTRIN) 81 MG EC tablet Take 1 tablet (81 mg total) by mouth once daily.  Patient not taking: Reported on 7/5/2023 2/19/20   Diana Matthews, NP   finasteride (PROPECIA) 1 mg tablet Take 1 mg by mouth once daily. 5/29/19   Provider, Historical   metoprolol succinate (TOPROL-XL) 50 MG 24 hr tablet Take 1 tablet (50 mg total) by mouth once daily.  Patient not taking: Reported on 12/5/2023 7/12/23 7/11/24  , MD Colt   metoprolol tartrate (LOPRESSOR) 25 MG tablet Take 25 mg by mouth 2 (two) times daily. 11/8/23   Provider, Historical   rivaroxaban (XARELTO) 20 mg Tab Take 20 mg by mouth daily with dinner or evening meal.    Provider, Historical         Medications - Current  Scheduled Meds:  Continuous Infusions:  PRN Meds:.      Allergies  Review of patient's allergies indicates:  No Known Allergies      Past Medical History  Past Medical History:   Diagnosis Date    Anticoagulant long-term use     ASD (atrial septal defect)     Atrial fibrillation     Sleep apnea          Past Surgical History  Past Surgical History:   Procedure Laterality Date    ABLATION OF ARRHYTHMOGENIC FOCUS FOR ATRIAL FIBRILLATION N/A 12/14/2018    Procedure: ABLATION, ARRHYTHMOGENIC FOCUS, FOR ATRIAL  FIBRILLATION;  Surgeon: Erick Agustin MD;  Location: [facility] EP LAB;  Service: Cardiology;  Laterality: N/A;  AF, PVI, RFA, JILLIAN, Gen, SK, 3prep    ABLATION OF ARRHYTHMOGENIC FOCUS FOR ATRIAL FIBRILLATION N/A 8/27/2019    Procedure: Ablation atrial fibrillation;  Surgeon: Erick Agustin MD;  Location: [facility] EP LAB;  Service: Cardiology;  Laterality: N/A;  AF, AFl (atypical), PVI (re-do), RFA, JILLIAN, Gen, SK, 3 Prep    CARDIOVERSION N/A 12/17/2018    Procedure: CARDIOVERSION;  Surgeon: Erick Agustin MD;  Location: [facility] EP LAB;  Service: Cardiology;  Laterality: N/A;  AF, DCCV, MAC, SK, 3prep    TRANSESOPHAGEAL ECHOCARDIOGRAPHY N/A 7/12/2023    Procedure: ECHOCARDIOGRAM, TRANSESOPHAGEAL;  Surgeon: [facility] Provider;  Location: [facility] EP LAB;  Service: Cardiology;  Laterality: N/A;    TREATMENT OF CARDIAC ARRHYTHMIA  12/14/2018    Procedure: Cardioversion or Defibrillation;  Surgeon: Erick Agustin MD;  Location: [facility] EP LAB;  Service: Cardiology;;    TREATMENT OF CARDIAC ARRHYTHMIA N/A 7/8/2019    Procedure: Cardioversion or Defibrillation;  Surgeon: Erick Agustin MD;  Location: [facility] EP LAB;  Service: Cardiology;  Laterality: N/A;  AFl, DCCV, MAC, SK, 3 Prep    TREATMENT OF CARDIAC ARRHYTHMIA N/A 7/12/2023    Procedure: Cardioversion or Defibrillation;  Surgeon: Erick Agustin MD;  Location: [facility] EP LAB;  Service: Cardiology;  Laterality: N/A;  AF, JOS, DCCV, MAC, SK, 3 PREP         Social History  Social History     Socioeconomic History    Marital status: Single   Tobacco Use    Smoking status: Never    Smokeless tobacco: Never   Substance and Sexual Activity    Alcohol use: Not Currently     Alcohol/week: 2.0 standard drinks of alcohol     Types: 2 Glasses of wine per week     Comment: occassional    Drug use: No     Social Determinants of Health     Financial Resource Strain: Low Risk  (12/4/2023)    Overall Financial Resource Strain (CARDIA)     Difficulty of Paying Living Expenses: Not hard at all   Food Insecurity:  No Food Insecurity (12/4/2023)    Hunger Vital Sign     Worried About Running Out of Food in the Last Year: Never true     Ran Out of Food in the Last Year: Never true   Transportation Needs: No Transportation Needs (12/4/2023)    PRAPARE - Transportation     Lack of Transportation (Medical): No     Lack of Transportation (Non-Medical): No   Physical Activity: Sufficiently Active (12/4/2023)    Exercise Vital Sign     Days of Exercise per Week: 5 days     Minutes of Exercise per Session: 30 min   Stress: No Stress Concern Present (12/4/2023)    [employer] [employer] - Occupational Stress Questionnaire     Feeling of Stress : Not at all   Social Connections: Unknown (12/4/2023)    Social Connection and Isolation Panel [NHANES]     Frequency of Communication with Friends and Family: More than three times a week     Frequency of Social Gatherings with Friends and Family: Once a week     Active Member of Clubs or Organizations: No     Attends Club or Organization Meetings: Never     Marital Status:    Housing Stability: Low Risk  (12/4/2023)    Housing Stability Vital Sign     Unable to Pay for Housing in the Last Year: No     Number of Places Lived in the Last Year: 1     Unstable Housing in the Last Year: No         ROS  Review of Systems   Constitutional: Negative for chills.   HENT: Negative.     Eyes: Negative.    Cardiovascular:  Negative for chest pain, dyspnea on exertion and palpitations.   Respiratory: Negative.  Negative for shortness of breath and sleep disturbances due to breathing.    Endocrine: Negative.    Musculoskeletal: Negative.    Gastrointestinal:  Negative for hematemesis, melena, nausea and vomiting.   Genitourinary: Negative.    Neurological: Negative.    Psychiatric/Behavioral: Negative.  Negative for altered mental status.    Allergic/Immunologic: Negative.    Physical Examination         Vital Signs  24 Hour VS Range    Temp:  [97.9 °F (36.6 °C)]   Pulse:  [76]  "  Resp:  [18]   BP: (119-122)/(76-84)   SpO2:  [98 %]   No intake or output data in the 24 hours ending 12/18/23 0945      Physical Exam:   Physical Exam  Constitutional:       General: He is not in acute distress.     Appearance: Normal appearance.   HENT:      Head: Normocephalic.      Mouth/Throat:      Mouth: Mucous membranes are moist.   Cardiovascular:      Rate and Rhythm: Normal rate. Rhythm irregular.   Pulmonary:      Effort: Pulmonary effort is normal.   Abdominal:      Palpations: Abdomen is soft.   Musculoskeletal:      Right lower leg: No edema.      Left lower leg: No edema.   Skin:     General: Skin is warm.   Neurological:      Mental Status: He is alert and oriented to person, place, and time.               Data       No results for input(s): "WBC", "HGB", "HCT", "PLT" in the last 168 hours.     Recent Labs   Lab 12/12/23  1316   INR 1.02        No results for input(s): "NA", "K", "CL", "CO2", "BUN", "CREATININE", "ANIONGAP", "CALCIUM" in the last 168 hours.     No results for input(s): "PROT", "ALBUMIN", "BILITOT", "ALKPHOS", "AST", "ALT" in the last 168 hours.     No results for input(s): "TROPONINI" in the last 168 hours.     No results found for: "BNP"    No results for input(s): "LABBLOO" in the last 168 hours.         Assessment & Plan     #Atrial fibrillation  - proceed with JOS/DCCV  Diana Matthews NP Plan from 12/5:   JOS/DCCV + Multaq. (May need to stop metoprolol if starting multaq to avoid worsening bradycardia)     JOS 7/12/23:  Atrial fibrillation observed.  Normal appearing left atrial appendage. No thrombus is present in the appendage. Abnormal appendage velocities 0.4m/s.  The estimated ejection fraction is 60%.  The left ventricle is normal in size with normal systolic function.  Normal right ventricular size with normal right ventricular systolic function.  Moderate bi atrial enlargement.  Mild tricuspid regurgitation.  Secundum interatrial septal defect is present that is " located anterosuperioly that is 0.5 with left to right shunting indicated by color flow Doppler. Overall appears unchaged from prior JOS. Consider full TTE for echocardiographic Qp/Qs if clinically applicable (not as accurate as angiographic study)  There is non-specific leaflet thickening of MV leaflets with no evidence of prolpase and trace MR.  Grade 1 plaque present in the transverse aorta.      -No absolute contraindications of esophageal stricture, tumor, perforation, laceration,or diverticulum and/or active GI bleed.  -The risks, benefits & alternatives of the procedure were explained to the patient.  -The risks of transesophageal echo include but are not limited to:  Dental trauma, esophageal trauma/perforation, bleeding, laryngospasm/brochospasm, aspiration, sore throat/hoarseness, & dislodgement of the endotracheal tube/nasogastric tube (where applicable).  -The risks of moderate sedation include hypotension, respiratory depression, arrhythmias, bronchospasm, & death.  -Prior to procedure, extensive discussion with patient regarding risks and benefits of DCCV at bedside today. The patient voices understanding, all questions have been answered, and patient would like to proceed.  -Informed consent was obtained. The patient is agreeable to proceed with the procedure and all questions and concerns addressed.    Case was discussed with an attending physician prior to procedure.    Daniella Stinson PA-C  [facility]

## 2023-12-18 NOTE — HOSPITAL COURSE
Patient underwent JOS without evidence of BHUPENDRA thrombus. Proceeded with DCCV, converting from AF to sinus rhythm. Patient tolerated the procedure without any acute complications. Post-DCCV ECG revealed sinus bradycardia at 54 bpm. Plan to continue all home medications including xarelto; start multaq; stop metoprolol. Instructed to return in 1 week for follow up ECG and in 4 weeks for clinic follow up with Dr. Agustin or Diana Matthews NP.   Patient was assessed at bedside prior to discharge, they reported feeling well and denied chest discomfort, shortness of breath, palpitations, lightheadedness, or any other acute symptoms. Discharge instructions were discussed with patient and all questions were answered. Patient was discharged home in stable condition.

## 2023-12-18 NOTE — DISCHARGE INSTRUCTIONS
Medications:  -Continue to take your home medications as listed on your medication list after you are discharged.    New Medications:  - START taking multaq 400mg twice daily (morning and evening, with meals).  - STOP taking metoprolol.    Diet  -You may resume oral intake after you are discharged, as long you have no swallowing difficulties.    Because you have received sedation for this procedure:  -Limit activity for the remainder of the day.  -Do not smoke for at least 6 hours and until you are fully awake and alert.  -Do not drink alcoholic beverage for 24 hours.  -Do not drive for 24 hours.  -Defer important decision making until the following day.     Go to the Emergency Department if you develop:   -Bleeding  -Weakness or numbness  -Visual, gait or speech disturbance  -New chest pain, palpitations, shortness of breath, rapid heart beat, or fainting  -Fever    Follow up:  -EKG in 1 week.  -Dr. Agustin or Diana Matthews NP in 1 month. Call or message the office to schedule.

## 2023-12-18 NOTE — DISCHARGE SUMMARY
[facility]  Cardiology  Discharge Summary      Patient Name: Juan Luis Guadalupe  MRN: [id number]  Admission Date: 12/18/2023  Hospital Length of Stay: 0 days  Discharge Date and Time:  12/18/2023 12:10 PM  Attending Physician: Erick Agustin MD    Discharging Provider: Daniella Stinson PA-C  Primary Care Physician: Ashleigh, Primary Doctor    HPI:   Patient of Dr. Agustin. Last OV with Diana Matthews NP on 12/5/23.   58 y.o. male with a past medical history of AF (PVI 2018, 2019), ASD, sleep apnea (on CPAP) with a telemedicine visit for follow up.     Primary cardiologist is Dr. Marie.  Presented to PCP with fatigue, noted to be in atrial fibrillation 10/18.  Placed on Xarelto. DCCV >> was back in AF the next day.     12/13/2018: PVI (RFA), Started on Propafenone.  On 12/17/2018 he was back in AF and underwent successful cardioversion.   Felt poorly on propafenone, which was discontinued 6/2019.   7/2019: developed recurrence. Xarelto restarted >> DCCV 7/8/2019. Rythmol resumed.  8/27/2019: Repeat PVI (LSPV and RIPV) ablation of atrial flutter (typical), posterior wall isolation. Propafenone discontinued.     Was referred back to Interventional cardiology for ASD closure. Ultimately decision made to defer closure.     7/5/23: Mr. Guadalupe developed persistent recurrence, symptomatic (6/2023). Initially occurred in the setting of dehydration/increasing activity and challenges with CPAP. However, AF has persisted. Xarelto resumed. \Placed on metoprolol and Propafenone. No DCCV. Propafenone discontinued after one week.  7/12/23: Mr. Guadalupe underwent successful JOS/DCCV. Instructed to continue anticoagulation with xarelto. Of note, JOS with known small ASD. Continue toprol increased from 25 mg to 50 mg daily.     Update (12/05/2023):  11/21/2023: He saw his local cardiologist and remains in AF. The did offer DCCV but also asked him why he didn't want to just stay in AF since he is asymptomatic.      Today, here for  JOS/DCCV. Feeling well, no complaints. Wife at bedside.    Procedure(s) (LRB):  Cardioversion or Defibrillation (N/A)  Transesophageal echo (JOS) intra-procedure log documentation (N/A)     Indwelling Lines/Drains at time of discharge:  none      Hospital Course:  Patient underwent JOS without evidence of BHUPENDRA thrombus. Proceeded with DCCV, converting from AF to sinus rhythm. Patient tolerated the procedure without any acute complications. Post-DCCV ECG revealed sinus bradycardia at 54 bpm. Plan to continue all home medications including xarelto; start multaq; stop metoprolol. Instructed to return in 1 week for follow up ECG and in 4 weeks for clinic follow up with Dr. Agustin or Diana Matthews NP.   Patient was assessed at bedside prior to discharge, they reported feeling well and denied chest discomfort, shortness of breath, palpitations, lightheadedness, or any other acute symptoms. Discharge instructions were discussed with patient and all questions were answered. Patient was discharged home in stable condition.      Goals of Care Treatment Preferences:  Code Status: Full Code    Living Will: Yes              Significant Diagnostic Studies: JOS - final report pending - prelim no BHUPENDRA thrombus, proceeded with DCCV      Pending Diagnostic Studies:       None            There are no hospital problems to display for this patient.    No new Assessment & Plan notes have been filed under this hospital service since the last note was generated.  Service: Cardiology      Discharged Condition: stable    Disposition: Home or Self Care    Follow Up:   Follow-up Information       Diana Matthews NP. Schedule an appointment as soon as possible for a visit in 1 month(s).    Specialty: Cardiology  Why: cardioversion follow-up  Contact information:  [facility] [address]  [facility] [id number] [phone]                           Patient Instructions:   No discharge procedures on file.  Medications:  Reconciled Home  Medications:      Medication List        START taking these medications      MULTAQ 400 mg Tab  Generic drug: dronedarone  Take 1 tablet (400 mg total) by mouth 2 (two) times daily with meals.            CONTINUE taking these medications      finasteride 1 mg tablet  Commonly known as: PROPECIA  Take 1 mg by mouth once daily.     rivaroxaban 20 mg Tab  Commonly known as: XARELTO  Take 20 mg by mouth daily with dinner or evening meal.            STOP taking these medications      metoprolol succinate 50 MG 24 hr tablet  Commonly known as: TOPROL-XL     metoprolol tartrate 25 MG tablet  Commonly known as: LOPRESSOR            ASK your doctor about these medications      aspirin 81 MG EC tablet  Commonly known as: ECOTRIN  Take 1 tablet (81 mg total) by mouth once daily.              Time spent on the discharge of patient: 35 minutes    Daniella Stinson PA-C  Cardiology  [facility]

## 2023-12-20 ENCOUNTER — TELEPHONE (OUTPATIENT)
Dept: ELECTROPHYSIOLOGY | Facility: CLINIC | Age: 58
End: 2023-12-20
Payer: COMMERCIAL

## 2024-01-24 ENCOUNTER — TELEPHONE (OUTPATIENT)
Dept: ELECTROPHYSIOLOGY | Facility: CLINIC | Age: 59
End: 2024-01-24
Payer: COMMERCIAL

## 2024-01-24 NOTE — TELEPHONE ENCOUNTER
Spoke with patient. He had Covid last week and did use Afrin nasal spray. He went back in to AF last night (HR 70-80's). Denies any symptoms. He did not want to take his Multaq because he read on the internet that it was dangerous to take Multaq if you are in AF. Advised that warning is for patients in permanent Afib. He is paroxysmal and should continue the Multaq and Xarelto until his appt on 1/29/24.

## 2024-01-24 NOTE — TELEPHONE ENCOUNTER
----- Message from Madelin Johnston sent at 1/24/2024 10:28 AM CST -----  Regarding: afib  2nd message    Pt says he left a message at 8:00am this morning about going back into afib last night and is concerned about taking his morning dose of dronedarone (MULTAQ) 400 mg Tab b/c he read that you should not take this medication while being in afib.  He can be reached at  [phone]    Thank you

## 2024-02-12 ENCOUNTER — PATIENT MESSAGE (OUTPATIENT)
Dept: ELECTROPHYSIOLOGY | Facility: CLINIC | Age: 59
End: 2024-02-12
Payer: COMMERCIAL

## 2024-02-14 NOTE — PROGRESS NOTES
Mr. Guadalupe is a patient of Dr. Agustin and was last seen in clinic 2/5/2023.      Subjective:   Patient ID:  Juan Luis Guadalupe is a 58 y.o. male who presents for follow up of Atrial Fibrillation  .     The patient location is: [facility]  The chief complaint leading to consultation is: atrial fibrillation  Visit type: audiovisual  Face to Face time with patient: 30  40 minutes of total time spent on the encounter, which includes face to face time and non-face to face time preparing to see the patient (eg, review of tests), Obtaining and/or reviewing separately obtained history, Documenting clinical information in the electronic or other health record, Independently interpreting results (not separately reported) and communicating results to the patient/family/caregiver, or Care coordination (not separately reported).   Each patient to whom he or she provides medical services by telemedicine is:  (1) informed of the relationship between the physician and patient and the respective role of any other health care provider with respect to management of the patient; and (2) notified that he or she may decline to receive medical services by telemedicine and may withdraw from such care at any time.    Notes:     Mr. Guadalupe is a 58 y.o. male with a past medical history of AF (PVI 2018, 2019), ASD, sleep apnea (on CPAP) with a telemedicine visit for follow up after cardioversion.    Background:    Primary cardiologist is Dr. Marie.  Presented to PCP with fatigue, noted to be in atrial fibrillation 10/18.     Echo 9/20/18 normal biventricular structure and function mildly dilated RA and LA, sinus venosus ASD < 10 mm.  Placed on Xarelto.  DCCV >> was back in AF the next day     12/13/2018 PVI (RFA). Started on Propafenone.  On 12/17/2018 he was back in AF and underwent successful cardioversion.   Felt poorly on propafenone, which was discontinued 6/2019.   7/2019, developed recurrence. Xarelto restarted >> DCCV 7/8/2019.  Rythmol resumed.     8/27/2019: Repeat PVI (LSPV and RIPV) ablation of atrial flutter (typical), posterior wall isolation. Propafenone discontinued.  Last seen in 2020. Was referred back to Interventional cardiology for ASD closure. Ultimately decision made to defer closure.     7/5/23 Mr. Guadalupe developed persistent recurrence, symptomatic (6/2023). Initially occurred in the setting of dehydration/increasing activity and challenges with CPAP. However, AF has persisted. Xarelto resumed. Taking in the morning. Placed on metoprolol and Propafenone. No DCCV. Has remained persistently out of rhythm. Propafenone discontinued after one week.  Echo 6/5/23 EF >60% normal RV size and function, mild MR, mildly dilated LA. JOS/DCCV scheduled.     7/12/23: Mr. Guadalupe underwent successful JOS/DCCV. Instructed to continue anticoagulation with xarelto. Of note, JOS with known small ASD. Continue toprol increased from 25 mg to 50 mg daily.    7/26/23: 1 week post DCCV ECG showed sinus bradycardia at 55 bmp  ms QRS 90 ms QT/Qtc 452/416    8/1/2023: Mr. Guadalupe presents today for AF 1 months s/p successful JOS/DCCV.  He is doing very well from an arrhythmia standpoint. He monitors for AF on his Apple Watch.     12/5/2023: Pt back in persistent AF. Per pt, rates range from 60s to 120s. Mildly symptomatic with symptoms that improve when his rates are controlled.  He has a hx of PVI x 2 and has not tolerated propafenone in the past. Options for rhythm control include another ablation (understanding diminishing returns) or AAD. Alternatively can try rate control if he is indeed asymptomatic and rates can be controlled. JOS prior to his last DCCV in July showed normal LVEF. He is interested in rhythm control at this time. We discussed tachy-chema syndrome. Options include tikosyn if QT is acceptable(requiring hospitalization) or multaq. He would like to try multaq. (May need to stop metoprolol if starting multaq to avoid worsening  bradycardia). He remains on xarelto for CVA prophylaxis.    Update (02/15/2024):    12/18/2023: Cardioversion was successfully performed with restoration of normal sinus rhythm.     Back in AF mid Jan.    Today pt says he does feel better when in SR. More energy when his rates are lower. Notices more LUND when in AF. No palpitations, LH, syncope, CP noted.  Says he had a couple of beers 2 days prior to reverting back to AF.  BPs have been great    He is currently taking xarelto 20mg daily for stroke prophylaxis and denies significant bleeding episodes. He is currently being treated with multaq 400mg BID for rhythm control. Kidney function is stable, with a creatinine of 1.22 on 4/8/2022.    I have personally reviewed the patient's EKG from 2/9/2024 (see media), which shows AF at 73bpm. QRS is 92. Q+Tc is 434.    Relevant Cardiac Test Results:    JOS (12/18/2023):    JOS for LA/BHUPENDRA evaluation prior to DCCV. No BHUPENDRA thrombus visualized.    Left Ventricle: The left ventricle is normal in size. Normal wall thickness. Normal wall motion. There is low normal systolic function with a visually estimated ejection fraction of 50 - 55%.    Right Ventricle: Normal right ventricular cavity size. Wall thickness is normal. Right ventricle wall motion  is normal. Systolic function is normal.    Biatrial enlargement The left atrial appendage appears normal. The left atrial appendage has a cactus morphology. Appendage velocity is normal at greater than 40 cm/sec. There is no thrombus in the cavity.    Aorta: Grade 1 atherosclerosis.      Current Outpatient Medications   Medication Sig    aspirin (ECOTRIN) 81 MG EC tablet Take 1 tablet (81 mg total) by mouth once daily. (Patient not taking: Reported on 7/5/2023)    dronedarone (MULTAQ) 400 mg Tab Take 1 tablet (400 mg total) by mouth 2 (two) times daily with meals.    finasteride (PROPECIA) 1 mg tablet Take 1 mg by mouth once daily.    rivaroxaban (XARELTO) 20 mg Tab Take 20 mg by mouth  "daily with dinner or evening meal.     No current facility-administered medications for this visit.       Review of Systems   Constitutional: Negative for malaise/fatigue.   Cardiovascular:  Positive for dyspnea on exertion (mild) and irregular heartbeat. Negative for chest pain, leg swelling and palpitations.   Respiratory:  Negative for shortness of breath.    Hematologic/Lymphatic: Negative for bleeding problem.   Skin:  Negative for rash.   Musculoskeletal:  Negative for myalgias.   Gastrointestinal:  Negative for hematemesis, hematochezia and nausea.   Genitourinary:  Negative for hematuria.   Neurological:  Negative for light-headedness.   Psychiatric/Behavioral:  Negative for altered mental status.    Allergic/Immunologic: Negative for persistent infections.       Objective:        Ht 6' 1" (1.854 m)   Wt 117 kg (258 lb)   BMI 34.04 kg/m²   - N/A telemedicine visit    Physical Exam  - N/A telemedicine visit    Lab Results   Component Value Date     04/08/2022     12/17/2018    K 4.7 04/08/2022    K 4.3 12/17/2018    BUN 22 04/08/2022    BUN 17 12/17/2018    CREATININE 1.22 04/08/2022    CREATININE 1.3 12/17/2018    HGB 15.8 12/13/2018    HCT 46.2 12/13/2018    LDLCALC 112 (H) 04/08/2022       Recent Labs   Lab 07/10/23  1309 12/12/23  1316   INR 1.10 1.02       Assessment:     1. Persistent atrial fibrillation    2. Atypical atrial flutter    3. Chronic anticoagulation    4. Obstructive sleep apnea syndrome      Plan:     In summary, Mr. Guadalupe is a 58 y.o. male with a past medical history of AF (PVI 2018, 2019), ASD, sleep apnea (on CPAP) with a telemedicine visit for follow up after cardioversion.  He is 2 mo s/p DCCV+multaq. Stayed in SR about a month then reverted back to AF. Did have a couple of beers 2 days prior. He is mildly symptomatic - reports feeling less LUND in SR. JOS EF 50-55%.  We discussed his options: repeat ablation (understanding diminishing returns of 3rd procedure), " amiodarone (not ideal given young age), possibly tikosyn, or rate control. We discussed that his odds of maintaining SR are higher if he is closer to an ideal weight and refrains from alcohol. He is compliant with CPAP. Ultimately he said he would like to lose 30lbs (may use GLP-1 agonist) and then try another DCCV and tikosyn hospitalization. He will also stop alcohol entirely. Continue xarelto.    Stop multaq  Start toprol 25mg BID  Continue xarelto  30lbs weight loss goal  Stop alcohol  RTC 3 mo to review progress      *A copy of this note has been sent to Dr. Agustin*    Follow up in about 3 months (around 5/15/2024).    ------------------------------------------------------------------    ATUL Arevalo, NP-C  Cardiac Electrophysiology

## 2024-02-15 ENCOUNTER — OFFICE VISIT (OUTPATIENT)
Dept: ELECTROPHYSIOLOGY | Facility: CLINIC | Age: 59
End: 2024-02-15
Payer: COMMERCIAL

## 2024-02-15 VITALS — HEIGHT: 73 IN | WEIGHT: 258 LBS | BODY MASS INDEX: 34.19 KG/M2

## 2024-02-15 DIAGNOSIS — I48.4 ATYPICAL ATRIAL FLUTTER: ICD-10-CM

## 2024-02-15 DIAGNOSIS — G47.33 OBSTRUCTIVE SLEEP APNEA SYNDROME: ICD-10-CM

## 2024-02-15 DIAGNOSIS — I48.19 PERSISTENT ATRIAL FIBRILLATION: Primary | ICD-10-CM

## 2024-02-15 DIAGNOSIS — Z79.01 CHRONIC ANTICOAGULATION: ICD-10-CM

## 2024-02-15 PROCEDURE — 99214 OFFICE O/P EST MOD 30 MIN: CPT | Mod: 95,,, | Performed by: NURSE PRACTITIONER

## 2024-02-15 PROCEDURE — 1159F MED LIST DOCD IN RCRD: CPT | Mod: CPTII,95,, | Performed by: NURSE PRACTITIONER

## 2024-02-15 PROCEDURE — 3008F BODY MASS INDEX DOCD: CPT | Mod: CPTII,95,, | Performed by: NURSE PRACTITIONER

## 2024-02-15 RX ORDER — METOPROLOL SUCCINATE 25 MG/1
25 TABLET, EXTENDED RELEASE ORAL 2 TIMES DAILY
Qty: 60 TABLET | Refills: 6 | Status: SHIPPED | OUTPATIENT
Start: 2024-02-15 | End: 2025-02-14

## 2024-08-12 ENCOUNTER — PATIENT MESSAGE (OUTPATIENT)
Dept: ELECTROPHYSIOLOGY | Facility: CLINIC | Age: 59
End: 2024-08-12
Payer: COMMERCIAL

## 2024-08-12 RX ORDER — METOPROLOL TARTRATE 25 MG/1
25 TABLET, FILM COATED ORAL 2 TIMES DAILY
Qty: 180 TABLET | Refills: 3 | Status: SHIPPED | OUTPATIENT
Start: 2024-08-12 | End: 2025-08-12

## 2024-08-12 NOTE — TELEPHONE ENCOUNTER
Please advise regarding the metoprolol. Per your last note:     In summary, Mr. Guadalupe is a 58 y.o. male with a past medical history of AF (PVI 2018, 2019), ASD, sleep apnea (on CPAP) with a telemedicine visit for follow up after cardioversion.  He is 2 mo s/p DCCV+multaq. Stayed in SR about a month then reverted back to AF. Did have a couple of beers 2 days prior. He is mildly symptomatic - reports feeling less LUND in SR. JOS EF 50-55%.  We discussed his options: repeat ablation (understanding diminishing returns of 3rd procedure), amiodarone (not ideal given young age), possibly tikosyn, or rate control. We discussed that his odds of maintaining SR are higher if he is closer to an ideal weight and refrains from alcohol. He is compliant with CPAP. Ultimately he said he would like to lose 30lbs (may use GLP-1 agonist) and then try another DCCV and tikosyn hospitalization. He will also stop alcohol entirely. Continue xarelto.     Stop multaq  Start toprol 25mg BID  Continue xarelto  30lbs weight loss goal  Stop alcohol  RTC 3 mo to review progress

## 2024-08-22 NOTE — NURSING TRANSFER
Nursing Transfer Note      8/26/2019     Transfer To: short stay 4    Transfer via stretcher    Transfer with rowdy gutierrez and I reported to jose alberto gutierrze    Transported by rowdy gutierrez    Medicines sent: none    Chart send with patient: Yes    Notified: to room no complaints no distress noted.    Patient reassessed at: see epic (date, time)    Upon arrival to floor: to room no complaints no distress noted.   [No Acute Distress] : no acute distress [Well Nourished] : well nourished [Well Developed] : well developed [Well-Appearing] : well-appearing [Normal Sclera/Conjunctiva] : normal sclera/conjunctiva [PERRL] : pupils equal round and reactive to light [EOMI] : extraocular movements intact [Normal Outer Ear/Nose] : the outer ears and nose were normal in appearance [Normal Oropharynx] : the oropharynx was normal [No JVD] : no jugular venous distention [No Lymphadenopathy] : no lymphadenopathy [Supple] : supple [Thyroid Normal, No Nodules] : the thyroid was normal and there were no nodules present [No Respiratory Distress] : no respiratory distress  [No Accessory Muscle Use] : no accessory muscle use [Clear to Auscultation] : lungs were clear to auscultation bilaterally [Normal Rate] : normal rate  [Regular Rhythm] : with a regular rhythm [Normal S1, S2] : normal S1 and S2 [No Murmur] : no murmur heard [No Carotid Bruits] : no carotid bruits [No Abdominal Bruit] : a ~M bruit was not heard ~T in the abdomen [No Varicosities] : no varicosities [Pedal Pulses Present] : the pedal pulses are present [No Edema] : there was no peripheral edema [No Palpable Aorta] : no palpable aorta [No Extremity Clubbing/Cyanosis] : no extremity clubbing/cyanosis [Soft] : abdomen soft [Non Tender] : non-tender [Non-distended] : non-distended [No Masses] : no abdominal mass palpated [No HSM] : no HSM [Normal Bowel Sounds] : normal bowel sounds [Normal Posterior Cervical Nodes] : no posterior cervical lymphadenopathy [Normal Anterior Cervical Nodes] : no anterior cervical lymphadenopathy [No CVA Tenderness] : no CVA  tenderness [No Spinal Tenderness] : no spinal tenderness [No Joint Swelling] : no joint swelling [Grossly Normal Strength/Tone] : grossly normal strength/tone [No Rash] : no rash [Coordination Grossly Intact] : coordination grossly intact [No Focal Deficits] : no focal deficits [Normal Gait] : normal gait [Deep Tendon Reflexes (DTR)] : deep tendon reflexes were 2+ and symmetric [Normal Affect] : the affect was normal [Normal Insight/Judgement] : insight and judgment were intact

## 2025-07-21 RX ORDER — METOPROLOL TARTRATE 25 MG/1
25 TABLET, FILM COATED ORAL 2 TIMES DAILY
Qty: 180 TABLET | Refills: 3 | Status: SHIPPED | OUTPATIENT
Start: 2025-07-21 | End: 2026-07-21

## (undated) DEVICE — CATH ADVISOR FI SENS ENBL 20MM

## (undated) DEVICE — INTRO AGILIS MED CRL 8.5F 71CM

## (undated) DEVICE — PAD DEFIB CADENCE ADULT R2

## (undated) DEVICE — NDL TRNSSPTL BRK-1 18GA 71CM

## (undated) DEVICE — PATCH ENSITE PRECISION NAVX SE

## (undated) DEVICE — ELECTRODE POLYHESIVEPRE-ATTACH

## (undated) DEVICE — KIT PROBE COVER WITH GEL

## (undated) DEVICE — INTRODUCER HEMOSTASIS 7.5F

## (undated) DEVICE — Device

## (undated) DEVICE — INTRO FAST-CATH SL1 8.5FR 63CM

## (undated) DEVICE — PACK EP DRAPE

## (undated) DEVICE — SET TUBING COOL POINT IRR

## (undated) DEVICE — INTRODUCER AVANTI 7.5F .038X11

## (undated) DEVICE — CATH TRICUSPID HALO XP 7FRX110

## (undated) DEVICE — CATH TACTICATH ABLAT BIDIR F-J

## (undated) DEVICE — COVER DRAPE ACUSON STERILE

## (undated) DEVICE — BOWL FLUID - BACK STOP

## (undated) DEVICE — PAD RADI FEMORAL

## (undated) DEVICE — CATH SUPREME QPLR CRD-2 6F 120

## (undated) DEVICE — REPROCESSED CATH ACUNAV 8FR

## (undated) DEVICE — COVER SURG TABLE BACK 44X76IN

## (undated) DEVICE — SHEATH HEMOSTASIS 8.5FR

## (undated) DEVICE — INTRODUCER AVANTI 8.5F .038X11

## (undated) DEVICE — CATH LIVEWIRE DCAPLR 7FRX115CM